# Patient Record
Sex: FEMALE | Race: WHITE | Employment: OTHER | URBAN - METROPOLITAN AREA
[De-identification: names, ages, dates, MRNs, and addresses within clinical notes are randomized per-mention and may not be internally consistent; named-entity substitution may affect disease eponyms.]

---

## 2017-02-15 ENCOUNTER — HOSPITAL ENCOUNTER (EMERGENCY)
Age: 42
Discharge: HOME | End: 2017-02-15
Attending: EMERGENCY MEDICINE
Payer: COMMERCIAL

## 2017-02-15 VITALS
RESPIRATION RATE: 20 BRPM | BODY MASS INDEX: 33.68 KG/M2 | TEMPERATURE: 98.2 F | SYSTOLIC BLOOD PRESSURE: 122 MMHG | HEIGHT: 62 IN | DIASTOLIC BLOOD PRESSURE: 84 MMHG | HEART RATE: 87 BPM | OXYGEN SATURATION: 98 % | WEIGHT: 183 LBS

## 2017-02-15 DIAGNOSIS — K02.9 DENTAL CARIES: Primary | ICD-10-CM

## 2017-02-15 PROCEDURE — 99282 EMERGENCY DEPT VISIT SF MDM: CPT

## 2017-02-15 RX ORDER — AMOXICILLIN AND CLAVULANATE POTASSIUM 875; 125 MG/1; MG/1
1 TABLET, FILM COATED ORAL 2 TIMES DAILY
Qty: 20 TABLET | Refills: 0 | Status: SHIPPED | OUTPATIENT
Start: 2017-02-15 | End: 2017-02-25

## 2017-02-15 RX ORDER — HYDROCODONE BITARTRATE AND ACETAMINOPHEN 5; 325 MG/1; MG/1
1-2 TABLET ORAL EVERY 6 HOURS PRN
Qty: 14 TABLET | Refills: 0 | Status: SHIPPED | OUTPATIENT
Start: 2017-02-15 | End: 2017-02-22

## 2017-02-15 ASSESSMENT — PAIN DESCRIPTION - DESCRIPTORS: DESCRIPTORS: SHARP;CONSTANT;SHOOTING

## 2017-02-15 ASSESSMENT — PAIN DESCRIPTION - LOCATION: LOCATION: TEETH

## 2017-02-15 ASSESSMENT — PAIN DESCRIPTION - PAIN TYPE: TYPE: ACUTE PAIN

## 2017-02-15 ASSESSMENT — PAIN SCALES - GENERAL: PAINLEVEL_OUTOF10: 8

## 2017-04-05 ENCOUNTER — HOSPITAL ENCOUNTER (EMERGENCY)
Age: 42
Discharge: HOME | End: 2017-04-05
Attending: EMERGENCY MEDICINE
Payer: COMMERCIAL

## 2017-04-05 VITALS
DIASTOLIC BLOOD PRESSURE: 76 MMHG | HEART RATE: 92 BPM | WEIGHT: 175 LBS | HEIGHT: 62 IN | BODY MASS INDEX: 32.2 KG/M2 | TEMPERATURE: 98.5 F | SYSTOLIC BLOOD PRESSURE: 106 MMHG | OXYGEN SATURATION: 97 % | RESPIRATION RATE: 18 BRPM

## 2017-04-05 DIAGNOSIS — G89.18 POSTOPERATIVE PAIN: Primary | ICD-10-CM

## 2017-04-05 PROCEDURE — 6360000002 HC RX W HCPCS: Performed by: EMERGENCY MEDICINE

## 2017-04-05 PROCEDURE — 99283 EMERGENCY DEPT VISIT LOW MDM: CPT

## 2017-04-05 PROCEDURE — 96372 THER/PROPH/DIAG INJ SC/IM: CPT

## 2017-04-05 RX ORDER — HYDROCODONE BITARTRATE AND ACETAMINOPHEN 5; 325 MG/1; MG/1
1 TABLET ORAL EVERY 6 HOURS PRN
Qty: 15 TABLET | Refills: 0 | Status: SHIPPED | OUTPATIENT
Start: 2017-04-05 | End: 2017-04-12

## 2017-04-05 RX ORDER — ONDANSETRON 4 MG/1
4 TABLET, ORALLY DISINTEGRATING ORAL ONCE
Status: COMPLETED | OUTPATIENT
Start: 2017-04-05 | End: 2017-04-05

## 2017-04-05 RX ORDER — HYDROMORPHONE HCL 110MG/55ML
1.5 PATIENT CONTROLLED ANALGESIA SYRINGE INTRAVENOUS ONCE
Status: COMPLETED | OUTPATIENT
Start: 2017-04-05 | End: 2017-04-05

## 2017-04-05 RX ORDER — GLIMEPIRIDE 4 MG/1
2 TABLET ORAL
Status: ON HOLD | COMMUNITY
End: 2022-09-29 | Stop reason: HOSPADM

## 2017-04-05 RX ORDER — LOSARTAN POTASSIUM 25 MG/1
50 TABLET ORAL DAILY
Status: ON HOLD | COMMUNITY
End: 2022-09-29 | Stop reason: HOSPADM

## 2017-04-05 RX ORDER — ATORVASTATIN CALCIUM 20 MG/1
40 TABLET, FILM COATED ORAL NIGHTLY
COMMUNITY

## 2017-04-05 RX ORDER — PIOGLITAZONEHYDROCHLORIDE 30 MG/1
30 TABLET ORAL DAILY
COMMUNITY

## 2017-04-05 RX ADMIN — ONDANSETRON 4 MG: 4 TABLET, ORALLY DISINTEGRATING ORAL at 20:30

## 2017-04-05 RX ADMIN — HYDROMORPHONE HYDROCHLORIDE 1.5 MG: 2 INJECTION, SOLUTION INTRAMUSCULAR; INTRAVENOUS; SUBCUTANEOUS at 20:30

## 2017-04-05 ASSESSMENT — PAIN SCALES - GENERAL
PAINLEVEL_OUTOF10: 10
PAINLEVEL_OUTOF10: 10

## 2017-04-05 ASSESSMENT — PAIN DESCRIPTION - DESCRIPTORS: DESCRIPTORS: ACHING

## 2017-04-05 ASSESSMENT — PAIN DESCRIPTION - PAIN TYPE: TYPE: ACUTE PAIN

## 2017-09-16 ENCOUNTER — HOSPITAL ENCOUNTER (EMERGENCY)
Age: 42
Discharge: HOME | End: 2017-09-17
Attending: EMERGENCY MEDICINE
Payer: COMMERCIAL

## 2017-09-16 DIAGNOSIS — T78.40XA ALLERGIC REACTION CAUSED BY A DRUG: ICD-10-CM

## 2017-09-16 DIAGNOSIS — R55 NEAR SYNCOPE: Primary | ICD-10-CM

## 2017-09-16 PROCEDURE — 99284 EMERGENCY DEPT VISIT MOD MDM: CPT

## 2017-09-16 PROCEDURE — 36415 COLL VENOUS BLD VENIPUNCTURE: CPT

## 2017-09-16 PROCEDURE — 80053 COMPREHEN METABOLIC PANEL: CPT

## 2017-09-16 PROCEDURE — 93005 ELECTROCARDIOGRAM TRACING: CPT

## 2017-09-16 RX ORDER — 0.9 % SODIUM CHLORIDE 0.9 %
500 INTRAVENOUS SOLUTION INTRAVENOUS ONCE
Status: COMPLETED | OUTPATIENT
Start: 2017-09-16 | End: 2017-09-17

## 2017-09-17 ENCOUNTER — APPOINTMENT (OUTPATIENT)
Dept: CT IMAGING | Age: 42
End: 2017-09-17
Payer: COMMERCIAL

## 2017-09-17 ENCOUNTER — APPOINTMENT (OUTPATIENT)
Dept: GENERAL RADIOLOGY | Age: 42
End: 2017-09-17
Payer: COMMERCIAL

## 2017-09-17 VITALS
HEIGHT: 62 IN | HEART RATE: 87 BPM | DIASTOLIC BLOOD PRESSURE: 74 MMHG | BODY MASS INDEX: 36.07 KG/M2 | WEIGHT: 196 LBS | RESPIRATION RATE: 18 BRPM | OXYGEN SATURATION: 98 % | TEMPERATURE: 98.8 F | SYSTOLIC BLOOD PRESSURE: 129 MMHG

## 2017-09-17 LAB
ALBUMIN SERPL-MCNC: 4.2 G/DL (ref 3.9–4.9)
ALP BLD-CCNC: 93 U/L (ref 40–130)
ALT SERPL-CCNC: 20 U/L (ref 0–33)
ANION GAP SERPL CALCULATED.3IONS-SCNC: 15 MEQ/L (ref 7–13)
AST SERPL-CCNC: 16 U/L (ref 0–35)
BASOPHILS ABSOLUTE: 0.1 K/UL (ref 0–0.2)
BASOPHILS RELATIVE PERCENT: 1 %
BILIRUB SERPL-MCNC: 0.1 MG/DL (ref 0–1.2)
BUN BLDV-MCNC: 7 MG/DL (ref 6–20)
CALCIUM SERPL-MCNC: 9.8 MG/DL (ref 8.6–10.2)
CHLORIDE BLD-SCNC: 94 MEQ/L (ref 98–107)
CO2: 26 MEQ/L (ref 22–29)
CREAT SERPL-MCNC: 0.63 MG/DL (ref 0.5–0.9)
EOSINOPHILS ABSOLUTE: 0.8 K/UL (ref 0–0.7)
EOSINOPHILS RELATIVE PERCENT: 6 %
GFR AFRICAN AMERICAN: >60
GFR NON-AFRICAN AMERICAN: >60
GLOBULIN: 3.3 G/DL (ref 2.3–3.5)
GLUCOSE BLD-MCNC: 107 MG/DL (ref 74–109)
HCT VFR BLD CALC: 39.7 % (ref 37–47)
HEMOGLOBIN: 13.3 G/DL (ref 12–16)
LYMPHOCYTES ABSOLUTE: 4.2 K/UL (ref 1–4.8)
LYMPHOCYTES RELATIVE PERCENT: 32.7 %
MCH RBC QN AUTO: 28.5 PG (ref 27–31.3)
MCHC RBC AUTO-ENTMCNC: 33.5 % (ref 33–37)
MCV RBC AUTO: 85 FL (ref 82–100)
MONOCYTES ABSOLUTE: 1 K/UL (ref 0.2–0.8)
MONOCYTES RELATIVE PERCENT: 7.5 %
NEUTROPHILS ABSOLUTE: 6.7 K/UL (ref 1.4–6.5)
NEUTROPHILS RELATIVE PERCENT: 52.8 %
PDW BLD-RTO: 15 % (ref 11.5–14.5)
PLATELET # BLD: 390 K/UL (ref 130–400)
POTASSIUM SERPL-SCNC: 4.1 MEQ/L (ref 3.5–5.1)
RBC # BLD: 4.67 M/UL (ref 4.2–5.4)
SODIUM BLD-SCNC: 135 MEQ/L (ref 132–144)
TOTAL PROTEIN: 7.5 G/DL (ref 6.4–8.1)
WBC # BLD: 12.7 K/UL (ref 4.8–10.8)

## 2017-09-17 PROCEDURE — 6360000002 HC RX W HCPCS: Performed by: EMERGENCY MEDICINE

## 2017-09-17 PROCEDURE — 70450 CT HEAD/BRAIN W/O DYE: CPT

## 2017-09-17 PROCEDURE — 85025 COMPLETE CBC W/AUTO DIFF WBC: CPT

## 2017-09-17 PROCEDURE — 71010 XR CHEST PORTABLE: CPT

## 2017-09-17 PROCEDURE — 2580000003 HC RX 258: Performed by: EMERGENCY MEDICINE

## 2017-09-17 PROCEDURE — 96374 THER/PROPH/DIAG INJ IV PUSH: CPT

## 2017-09-17 RX ORDER — DIPHENHYDRAMINE HYDROCHLORIDE 50 MG/ML
25 INJECTION INTRAMUSCULAR; INTRAVENOUS ONCE
Status: COMPLETED | OUTPATIENT
Start: 2017-09-17 | End: 2017-09-17

## 2017-09-17 RX ORDER — 0.9 % SODIUM CHLORIDE 0.9 %
500 INTRAVENOUS SOLUTION INTRAVENOUS ONCE
Status: COMPLETED | OUTPATIENT
Start: 2017-09-17 | End: 2017-09-17

## 2017-09-17 RX ORDER — DIPHENHYDRAMINE HCL 25 MG
25 CAPSULE ORAL EVERY 4 HOURS PRN
Qty: 1 CAPSULE | Refills: 0 | Status: SHIPPED | OUTPATIENT
Start: 2017-09-17 | End: 2017-09-27

## 2017-09-17 RX ADMIN — DIPHENHYDRAMINE HYDROCHLORIDE 25 MG: 50 INJECTION, SOLUTION INTRAMUSCULAR; INTRAVENOUS at 01:11

## 2017-09-17 RX ADMIN — SODIUM CHLORIDE 500 ML: 9 INJECTION, SOLUTION INTRAVENOUS at 02:21

## 2017-09-17 RX ADMIN — SODIUM CHLORIDE 500 ML: 9 INJECTION, SOLUTION INTRAVENOUS at 01:12

## 2017-09-17 ASSESSMENT — ENCOUNTER SYMPTOMS
VOMITING: 0
NAUSEA: 0
DIARRHEA: 0
ABDOMINAL PAIN: 0
BACK PAIN: 0
COUGH: 0
SHORTNESS OF BREATH: 0

## 2017-09-18 LAB
EKG ATRIAL RATE: 102 BPM
EKG P AXIS: -2 DEGREES
EKG P-R INTERVAL: 130 MS
EKG Q-T INTERVAL: 348 MS
EKG QRS DURATION: 92 MS
EKG QTC CALCULATION (BAZETT): 453 MS
EKG R AXIS: 28 DEGREES
EKG T AXIS: 55 DEGREES
EKG VENTRICULAR RATE: 102 BPM

## 2017-09-18 PROCEDURE — 93010 ELECTROCARDIOGRAM REPORT: CPT | Performed by: INTERNAL MEDICINE

## 2017-11-28 ENCOUNTER — HOSPITAL ENCOUNTER (EMERGENCY)
Age: 42
Discharge: HOME | End: 2017-11-28
Payer: COMMERCIAL

## 2017-11-28 ENCOUNTER — APPOINTMENT (OUTPATIENT)
Dept: GENERAL RADIOLOGY | Age: 42
End: 2017-11-28
Payer: COMMERCIAL

## 2017-11-28 VITALS
HEIGHT: 63 IN | SYSTOLIC BLOOD PRESSURE: 123 MMHG | DIASTOLIC BLOOD PRESSURE: 78 MMHG | WEIGHT: 192 LBS | HEART RATE: 110 BPM | OXYGEN SATURATION: 100 % | TEMPERATURE: 98.2 F | BODY MASS INDEX: 34.02 KG/M2 | RESPIRATION RATE: 20 BRPM

## 2017-11-28 DIAGNOSIS — R05.9 COUGH: ICD-10-CM

## 2017-11-28 DIAGNOSIS — Z71.6 ENCOUNTER FOR SMOKING CESSATION COUNSELING: ICD-10-CM

## 2017-11-28 DIAGNOSIS — J40 BRONCHITIS: Primary | ICD-10-CM

## 2017-11-28 PROCEDURE — 71020 XR CHEST STANDARD TWO VW: CPT

## 2017-11-28 PROCEDURE — 99283 EMERGENCY DEPT VISIT LOW MDM: CPT

## 2017-11-28 RX ORDER — TOPIRAMATE 100 MG/1
100 TABLET, FILM COATED ORAL DAILY
COMMUNITY
End: 2019-04-03

## 2017-11-28 RX ORDER — SERTRALINE HYDROCHLORIDE 100 MG/1
100 TABLET, FILM COATED ORAL DAILY
Status: ON HOLD | COMMUNITY
End: 2022-09-29 | Stop reason: HOSPADM

## 2017-11-28 RX ORDER — BENZONATATE 100 MG/1
100 CAPSULE ORAL 3 TIMES DAILY PRN
Qty: 12 CAPSULE | Refills: 0 | Status: SHIPPED | OUTPATIENT
Start: 2017-11-28 | End: 2018-06-10

## 2017-11-28 RX ORDER — BUSPIRONE HYDROCHLORIDE 15 MG/1
15 TABLET ORAL NIGHTLY
Status: ON HOLD | COMMUNITY
End: 2022-09-29 | Stop reason: HOSPADM

## 2017-11-28 RX ORDER — AZITHROMYCIN 250 MG/1
TABLET, FILM COATED ORAL
Qty: 1 PACKET | Refills: 0 | Status: SHIPPED | OUTPATIENT
Start: 2017-11-28 | End: 2017-12-08

## 2017-11-28 ASSESSMENT — PAIN DESCRIPTION - LOCATION: LOCATION: ANKLE

## 2017-11-28 ASSESSMENT — ENCOUNTER SYMPTOMS
PHOTOPHOBIA: 0
COUGH: 1
SHORTNESS OF BREATH: 0
NAUSEA: 0
VOICE CHANGE: 0
APNEA: 0
EYE PAIN: 0
SORE THROAT: 1
ANAL BLEEDING: 0
EYE DISCHARGE: 0
ABDOMINAL DISTENTION: 0
VOMITING: 0

## 2017-11-28 ASSESSMENT — PAIN SCALES - GENERAL: PAINLEVEL_OUTOF10: 8

## 2017-11-28 ASSESSMENT — PAIN DESCRIPTION - ORIENTATION: ORIENTATION: LEFT

## 2017-11-28 NOTE — ED PROVIDER NOTES
[facility] ED  eMERGENCY dEPARTMENT eNCOUnter      Pt Name: Jamil Munroe  MRN: [id number]  [place] 1975  Date of evaluation: 11/28/2017  Provider: Emir Diamond Dr       Chief Complaint   Patient presents with    Cough         HISTORY OF PRESENT ILLNESS   (Location/Symptom, Timing/Onset, Context/Setting, Quality, Duration, Modifying Factors, Severity)  Note limiting factors. Jamil Munroe is a 43 y.o. female who presents to the emergency department Greater than 1 week history of dry cough which is worsening patient states she feels as if she will vomit decreased coughing. Patient denies pain productive cough fever chills. She states she has had history of bronchitis and walking pneumonia she does have diabetes which is undertaken control of this time. With average monthly glucose of 113 and HPI C less than 6.5. Chest medications including patient's allergies. Symptoms mild to moderate in severity nothing improves or worsens symptoms. HPI    Nursing Notes were reviewed. REVIEW OF SYSTEMS    (2-9 systems for level 4, 10 or more for level 5)     Review of Systems   Constitutional: Negative for activity change, appetite change, fever and unexpected weight change. HENT: Positive for sore throat. Negative for ear discharge, nosebleeds and voice change. Eyes: Negative for photophobia, pain and discharge. Respiratory: Positive for cough. Negative for apnea and shortness of breath. Cardiovascular: Negative for chest pain. Gastrointestinal: Negative for abdominal distention, anal bleeding, nausea and vomiting. Endocrine: Negative for cold intolerance, heat intolerance and polyphagia. Genitourinary: Negative for genital sores. Musculoskeletal: Negative for joint swelling, neck pain and neck stiffness. Skin: Negative for pallor. Allergic/Immunologic: Negative for immunocompromised state.    Neurological: Negative for seizures, facial asymmetry and headaches. Hematological: Does not bruise/bleed easily. Psychiatric/Behavioral: Negative for behavioral problems, self-injury and sleep disturbance. All other systems reviewed and are negative. Except as noted above the remainder of the review of systems was reviewed and negative.        PAST MEDICAL HISTORY     Past Medical History:   Diagnosis Date    Chronic back pain     Chronic neck pain     Diabetes mellitus ([facility] [address]     Hyperlipidemia     Osteoarthritis          SURGICAL HISTORY       Past Surgical History:   Procedure Laterality Date    ANKLE SURGERY Right     ANUS SURGERY      anal sphincter reconstruction    CYST REMOVAL Left     KNEE SURGERY Right     OTHER SURGICAL HISTORY      spinal stimulator    OVARIAN CYST REMOVAL Right     TONSILLECTOMY      TUBAL LIGATION      and then reversal of tubal ligation         CURRENT MEDICATIONS       Previous Medications    AMITRIPTYLINE (ELAVIL) 25 MG TABLET    Take 25 mg by mouth nightly    ATORVASTATIN (LIPITOR) 20 MG TABLET    Take 20 mg by mouth nightly    BUSPIRONE (BUSPAR) 15 MG TABLET    Take 15 mg by mouth nightly    DEXTROSE 5 % SOLN WITH EPINEPHRINE 1 MG/ML SOLN 10 MCG/ML    Infuse 0.1 mcg/kg/min intravenously continuous    GABAPENTIN (NEURONTIN) 300 MG CAPSULE    Take 600 mg by mouth 4 times daily 600mg @6am, 2pm, 6pm, 300mg @10pm     GLIMEPIRIDE (AMARYL) 4 MG TABLET    Take 4 mg by mouth every morning (before breakfast)    LOSARTAN (COZAAR) 25 MG TABLET    Take 25 mg by mouth daily    METFORMIN (GLUCOPHAGE) 500 MG TABLET    Take 1 tablet by mouth 2 times daily (with meals)    OMEPRAZOLE (PRILOSEC) 10 MG DELAYED RELEASE CAPSULE    Take 10 mg by mouth daily    OXYCODONE HCL (OXYCONTIN PO)    Take 5 mg by mouth every 4 hours as needed    PIOGLITAZONE (ACTOS) 30 MG TABLET    Take 30 mg by mouth daily    SERTRALINE (ZOLOFT) 100 MG TABLET    Take 100 mg by mouth daily    TIZANIDINE (ZANAFLEX) 2 MG TABLET    Take 2 mg by mouth 4 times daily DISPOSITION/PLAN   DISPOSITION     PATIENT REFERRED TO:  Kiersten Huffman MD  [address] [address]  [phone]    Schedule an appointment as soon as possible for a visit in 2 days      [facility]) ED  [address] [address]  [phone]    If symptoms worsen      DISCHARGE MEDICATIONS:  New Prescriptions    AZITHROMYCIN (ZITHROMAX) 250 MG TABLET    Take 2 tablets (500 mg) on Day 1, followed by 1 tablet (250 mg) once daily on Days 2 through 5.     BENZONATATE (TESSALON PERLES) 100 MG CAPSULE    Take 1 capsule by mouth 3 times daily as needed for Cough          (Please note that portions of this note were completed with a voice recognition program.  Efforts were made to edit the dictations but occasionally words are mis-transcribed.)    Gunjan Yanes PA-C (electronically signed)  Attending Emergency Physician         Gunjan Yanes PA-C  11/28/17 [id number]       Gunjan Yanes PA-C  11/28/17 1131

## 2017-12-08 ENCOUNTER — HOSPITAL ENCOUNTER (OUTPATIENT)
Dept: ORTHOPEDIC SURGERY | Age: 42
Discharge: HOME | End: 2017-12-08
Payer: COMMERCIAL

## 2017-12-08 DIAGNOSIS — M25.511 PAIN IN JOINT OF RIGHT SHOULDER: ICD-10-CM

## 2017-12-08 PROCEDURE — 73030 X-RAY EXAM OF SHOULDER: CPT

## 2017-12-13 ENCOUNTER — APPOINTMENT (OUTPATIENT)
Dept: GENERAL RADIOLOGY | Age: 42
End: 2017-12-13
Payer: COMMERCIAL

## 2017-12-13 ENCOUNTER — HOSPITAL ENCOUNTER (EMERGENCY)
Age: 42
Discharge: HOME HEALTH | End: 2017-12-14
Payer: COMMERCIAL

## 2017-12-13 VITALS
TEMPERATURE: 98.5 F | HEART RATE: 107 BPM | OXYGEN SATURATION: 99 % | WEIGHT: 190 LBS | HEIGHT: 63 IN | DIASTOLIC BLOOD PRESSURE: 82 MMHG | BODY MASS INDEX: 33.66 KG/M2 | SYSTOLIC BLOOD PRESSURE: 115 MMHG | RESPIRATION RATE: 18 BRPM

## 2017-12-13 DIAGNOSIS — S89.92XA KNEE INJURY, LEFT, INITIAL ENCOUNTER: Primary | ICD-10-CM

## 2017-12-13 DIAGNOSIS — S80.02XA CONTUSION OF LEFT KNEE, INITIAL ENCOUNTER: ICD-10-CM

## 2017-12-13 PROCEDURE — 6360000002 HC RX W HCPCS: Performed by: PHYSICIAN ASSISTANT

## 2017-12-13 PROCEDURE — 99283 EMERGENCY DEPT VISIT LOW MDM: CPT

## 2017-12-13 PROCEDURE — 73564 X-RAY EXAM KNEE 4 OR MORE: CPT

## 2017-12-13 RX ORDER — ONDANSETRON 4 MG/1
4 TABLET, ORALLY DISINTEGRATING ORAL ONCE
Status: COMPLETED | OUTPATIENT
Start: 2017-12-13 | End: 2017-12-13

## 2017-12-13 RX ADMIN — ONDANSETRON 4 MG: 4 TABLET, ORALLY DISINTEGRATING ORAL at 23:17

## 2017-12-13 ASSESSMENT — PAIN SCALES - GENERAL: PAINLEVEL_OUTOF10: 7

## 2017-12-13 ASSESSMENT — PAIN DESCRIPTION - LOCATION: LOCATION: KNEE

## 2017-12-13 ASSESSMENT — PAIN DESCRIPTION - ORIENTATION: ORIENTATION: LEFT

## 2017-12-13 ASSESSMENT — PAIN DESCRIPTION - PAIN TYPE: TYPE: ACUTE PAIN

## 2017-12-14 ASSESSMENT — ENCOUNTER SYMPTOMS
EYE DISCHARGE: 0
NAUSEA: 0
APNEA: 0
COUGH: 0
EYE PAIN: 0
ANAL BLEEDING: 0
VOICE CHANGE: 0
ABDOMINAL DISTENTION: 0
PHOTOPHOBIA: 0
VOMITING: 0

## 2017-12-14 NOTE — ED PROVIDER NOTES
[facility] ED  eMERGENCY dEPARTMENT eNCOUnter      Pt Name: Raul Benavides  MRN: [id number]  [place] 1975  Date of evaluation: 12/13/2017  Provider: Bertin Britt PA-C    CHIEF COMPLAINT       Chief Complaint   Patient presents with    Knee Injury     left         HISTORY OF PRESENT ILLNESS   (Location/Symptom, Timing/Onset, Context/Setting, Quality, Duration, Modifying Factors, Severity)  Note limiting factors. Raul Benavides is a 43 y.o. female who presents to the emergency department Patient injured left knee she had recent left ankle surgery has blocked for this. Contusion noted left knee. Symptoms mild to moderate severity movement worsens symptoms patient denies any additional injuries denies neck pain loss of consciousness. HPI    Nursing Notes were reviewed. REVIEW OF SYSTEMS    (2-9 systems for level 4, 10 or more for level 5)     Review of Systems   Constitutional: Negative for activity change, appetite change, fever and unexpected weight change. HENT: Negative for ear discharge, nosebleeds and voice change. Eyes: Negative for photophobia, pain and discharge. Respiratory: Negative for apnea and cough. Cardiovascular: Negative for chest pain. Gastrointestinal: Negative for abdominal distention, anal bleeding, nausea and vomiting. Endocrine: Negative for cold intolerance, heat intolerance and polyphagia. Genitourinary: Negative for genital sores. Musculoskeletal: Positive for arthralgias and gait problem. Negative for joint swelling and neck pain. Skin: Negative for pallor. Allergic/Immunologic: Negative for immunocompromised state. Neurological: Negative for seizures and facial asymmetry. Hematological: Does not bruise/bleed easily. Psychiatric/Behavioral: Negative for behavioral problems, self-injury and sleep disturbance. All other systems reviewed and are negative.       Except as noted above the remainder of the review of systems was Guaifenesin & derivatives; Influenza vaccines; and Morphine    FAMILY HISTORY     History reviewed. No pertinent family history. SOCIAL HISTORY       Social History     Social History    Marital status: Single     Spouse name: N/A    Number of children: N/A    Years of education: N/A     Social History Main Topics    Smoking status: Current Every Day Smoker     Packs/day: 0.50     Years: 20.00     Types: Cigarettes    Smokeless tobacco: Never Used    Alcohol use Yes      Comment: rarely    Drug use: No    Sexual activity: Not Asked     Other Topics Concern    None     Social History Narrative    None       SCREENINGS             PHYSICAL EXAM    (up to 7 for level 4, 8 or more for level 5)     ED Triage Vitals [12/13/17 2209]   BP Temp Temp Source Pulse Resp SpO2 Height Weight   115/82 98.5 °F (36.9 °C) Oral 107 18 99 % 5' 3\" (1.6 m) 190 lb (86.2 kg)       Physical Exam   Constitutional: She is oriented to person, place, and time. She appears well-developed and well-nourished. No distress. HENT:   Head: Normocephalic and atraumatic. Eyes: Pupils are equal, round, and reactive to light. Right eye exhibits no discharge. Left eye exhibits no discharge. Neck: Normal range of motion. Neck supple. Cardiovascular: Normal rate, regular rhythm, normal heart sounds and intact distal pulses. Pulmonary/Chest: Effort normal and breath sounds normal. No stridor. No respiratory distress. Abdominal: Soft. Bowel sounds are normal. She exhibits no distension. Musculoskeletal: Normal range of motion. She exhibits tenderness. Osgood tender overlying patella posterior to knee. Negative varus negative valgus negative medial lateral collateral ligament tenderness positive contusion noted to the patella   Neurological: She is alert and oriented to person, place, and time. Skin: Skin is warm. No erythema. Psychiatric: She has a normal mood and affect. Nursing note and vitals reviewed.       DIAGNOSTIC [id number] [phone]    If symptoms worsen      DISCHARGE MEDICATIONS:  New Prescriptions    No medications on file          (Please note that portions of this note were completed with a voice recognition program.  Efforts were made to edit the dictations but occasionally words are mis-transcribed.)    Ismael Gurrola PA-C (electronically signed)  Attending Emergency Physician         Ismael Gurrola PA-C  12/14/17 0015

## 2017-12-14 NOTE — ED NOTES
Bed: 15  Expected date: 12/13/17  Expected time: 10:03 PM  Means of arrival: [facility]  Comments:  43 female left hip pain, VSS, A/O x Cheng Mclain RN  12/13/17 0936

## 2018-05-17 ENCOUNTER — HOSPITAL ENCOUNTER (OUTPATIENT)
Dept: SLEEP CENTER | Age: 43
Discharge: HOME | End: 2018-05-19
Payer: COMMERCIAL

## 2018-05-17 PROCEDURE — 95810 POLYSOM 6/> YRS 4/> PARAM: CPT

## 2018-06-10 ENCOUNTER — HOSPITAL ENCOUNTER (EMERGENCY)
Age: 43
Discharge: HOME | End: 2018-06-10
Payer: COMMERCIAL

## 2018-06-10 ENCOUNTER — APPOINTMENT (OUTPATIENT)
Dept: GENERAL RADIOLOGY | Age: 43
End: 2018-06-10
Payer: COMMERCIAL

## 2018-06-10 VITALS
WEIGHT: 186 LBS | BODY MASS INDEX: 32.96 KG/M2 | RESPIRATION RATE: 20 BRPM | HEART RATE: 110 BPM | SYSTOLIC BLOOD PRESSURE: 126 MMHG | HEIGHT: 63 IN | OXYGEN SATURATION: 97 % | TEMPERATURE: 98 F | DIASTOLIC BLOOD PRESSURE: 79 MMHG

## 2018-06-10 DIAGNOSIS — J40 BRONCHITIS: Primary | ICD-10-CM

## 2018-06-10 LAB
ALBUMIN SERPL-MCNC: 4.1 G/DL (ref 3.9–4.9)
ALP BLD-CCNC: 120 U/L (ref 40–130)
ALT SERPL-CCNC: 20 U/L (ref 0–33)
ANION GAP SERPL CALCULATED.3IONS-SCNC: 14 MEQ/L (ref 7–13)
AST SERPL-CCNC: 17 U/L (ref 0–35)
BASOPHILS ABSOLUTE: 0.1 K/UL (ref 0–0.2)
BASOPHILS RELATIVE PERCENT: 1 %
BILIRUB SERPL-MCNC: <0.2 MG/DL (ref 0–1.2)
BUN BLDV-MCNC: 15 MG/DL (ref 6–20)
CALCIUM SERPL-MCNC: 8.9 MG/DL (ref 8.6–10.2)
CHLORIDE BLD-SCNC: 98 MEQ/L (ref 98–107)
CO2: 24 MEQ/L (ref 22–29)
CREAT SERPL-MCNC: 0.54 MG/DL (ref 0.5–0.9)
EKG ATRIAL RATE: 87 BPM
EKG P AXIS: 43 DEGREES
EKG P-R INTERVAL: 134 MS
EKG Q-T INTERVAL: 380 MS
EKG QRS DURATION: 92 MS
EKG QTC CALCULATION (BAZETT): 457 MS
EKG R AXIS: 43 DEGREES
EKG T AXIS: 81 DEGREES
EKG VENTRICULAR RATE: 87 BPM
EOSINOPHILS ABSOLUTE: 0.8 K/UL (ref 0–0.7)
EOSINOPHILS RELATIVE PERCENT: 10.1 %
GFR AFRICAN AMERICAN: >60
GFR NON-AFRICAN AMERICAN: >60
GLOBULIN: 3.2 G/DL (ref 2.3–3.5)
GLUCOSE BLD-MCNC: 161 MG/DL (ref 74–109)
HCT VFR BLD CALC: 37.6 % (ref 37–47)
HEMOGLOBIN: 12.7 G/DL (ref 12–16)
LYMPHOCYTES ABSOLUTE: 1.9 K/UL (ref 1–4.8)
LYMPHOCYTES RELATIVE PERCENT: 22.6 %
MCH RBC QN AUTO: 30.4 PG (ref 27–31.3)
MCHC RBC AUTO-ENTMCNC: 33.7 % (ref 33–37)
MCV RBC AUTO: 90.1 FL (ref 82–100)
MONOCYTES ABSOLUTE: 1.1 K/UL (ref 0.2–0.8)
MONOCYTES RELATIVE PERCENT: 12.7 %
NEUTROPHILS ABSOLUTE: 4.4 K/UL (ref 1.4–6.5)
NEUTROPHILS RELATIVE PERCENT: 53.6 %
PDW BLD-RTO: 13.9 % (ref 11.5–14.5)
PLATELET # BLD: 320 K/UL (ref 130–400)
POTASSIUM SERPL-SCNC: 3.9 MEQ/L (ref 3.5–5.1)
RBC # BLD: 4.17 M/UL (ref 4.2–5.4)
SODIUM BLD-SCNC: 136 MEQ/L (ref 132–144)
TOTAL PROTEIN: 7.3 G/DL (ref 6.4–8.1)
TROPONIN: <0.01 NG/ML (ref 0–0.01)
WBC # BLD: 8.3 K/UL (ref 4.8–10.8)

## 2018-06-10 PROCEDURE — 84484 ASSAY OF TROPONIN QUANT: CPT

## 2018-06-10 PROCEDURE — 85025 COMPLETE CBC W/AUTO DIFF WBC: CPT

## 2018-06-10 PROCEDURE — 93005 ELECTROCARDIOGRAM TRACING: CPT

## 2018-06-10 PROCEDURE — 80053 COMPREHEN METABOLIC PANEL: CPT

## 2018-06-10 PROCEDURE — 94760 N-INVAS EAR/PLS OXIMETRY 1: CPT

## 2018-06-10 PROCEDURE — 6360000002 HC RX W HCPCS: Performed by: NURSE PRACTITIONER

## 2018-06-10 PROCEDURE — 96374 THER/PROPH/DIAG INJ IV PUSH: CPT

## 2018-06-10 PROCEDURE — 94640 AIRWAY INHALATION TREATMENT: CPT

## 2018-06-10 PROCEDURE — 99284 EMERGENCY DEPT VISIT MOD MDM: CPT

## 2018-06-10 PROCEDURE — 71046 X-RAY EXAM CHEST 2 VIEWS: CPT

## 2018-06-10 PROCEDURE — 6370000000 HC RX 637 (ALT 250 FOR IP): Performed by: NURSE PRACTITIONER

## 2018-06-10 PROCEDURE — 36415 COLL VENOUS BLD VENIPUNCTURE: CPT

## 2018-06-10 RX ORDER — PREDNISONE 20 MG/1
40 TABLET ORAL DAILY
Qty: 10 TABLET | Refills: 0 | Status: SHIPPED | OUTPATIENT
Start: 2018-06-10 | End: 2018-06-15

## 2018-06-10 RX ORDER — AZITHROMYCIN 250 MG/1
TABLET, FILM COATED ORAL
Qty: 1 PACKET | Refills: 0 | Status: SHIPPED | OUTPATIENT
Start: 2018-06-10 | End: 2018-06-20

## 2018-06-10 RX ORDER — OXCARBAZEPINE 150 MG/1
300 TABLET, FILM COATED ORAL 2 TIMES DAILY
COMMUNITY

## 2018-06-10 RX ORDER — IPRATROPIUM BROMIDE AND ALBUTEROL SULFATE 2.5; .5 MG/3ML; MG/3ML
1 SOLUTION RESPIRATORY (INHALATION) EVERY 10 MIN PRN
Status: COMPLETED | OUTPATIENT
Start: 2018-06-10 | End: 2018-06-10

## 2018-06-10 RX ORDER — BENZONATATE 100 MG/1
200 CAPSULE ORAL 3 TIMES DAILY PRN
Qty: 20 CAPSULE | Refills: 0 | Status: ON HOLD | OUTPATIENT
Start: 2018-06-10 | End: 2022-09-28

## 2018-06-10 RX ORDER — METHYLPREDNISOLONE SODIUM SUCCINATE 125 MG/2ML
125 INJECTION, POWDER, LYOPHILIZED, FOR SOLUTION INTRAMUSCULAR; INTRAVENOUS ONCE
Status: COMPLETED | OUTPATIENT
Start: 2018-06-10 | End: 2018-06-10

## 2018-06-10 RX ORDER — ALBUTEROL SULFATE 90 UG/1
AEROSOL, METERED RESPIRATORY (INHALATION)
Qty: 1 INHALER | Refills: 1 | Status: ON HOLD | OUTPATIENT
Start: 2018-06-10 | End: 2022-09-28

## 2018-06-10 RX ADMIN — IPRATROPIUM BROMIDE AND ALBUTEROL SULFATE 1 AMPULE: .5; 3 SOLUTION RESPIRATORY (INHALATION) at 05:17

## 2018-06-10 RX ADMIN — IPRATROPIUM BROMIDE AND ALBUTEROL SULFATE 1 AMPULE: .5; 3 SOLUTION RESPIRATORY (INHALATION) at 05:27

## 2018-06-10 RX ADMIN — METHYLPREDNISOLONE SODIUM SUCCINATE 125 MG: 125 INJECTION, POWDER, FOR SOLUTION INTRAMUSCULAR; INTRAVENOUS at 05:14

## 2018-06-10 RX ADMIN — IPRATROPIUM BROMIDE AND ALBUTEROL SULFATE 1 AMPULE: .5; 3 SOLUTION RESPIRATORY (INHALATION) at 05:06

## 2018-06-10 ASSESSMENT — ENCOUNTER SYMPTOMS
ABDOMINAL PAIN: 0
SHORTNESS OF BREATH: 1
CONSTIPATION: 0
VOMITING: 0
WHEEZING: 1
CHEST TIGHTNESS: 1
DIARRHEA: 0
SORE THROAT: 1
COUGH: 1
TROUBLE SWALLOWING: 0
VOICE CHANGE: 0
BACK PAIN: 1
SINUS PAIN: 0
RHINORRHEA: 1
ABDOMINAL DISTENTION: 0
SINUS PRESSURE: 1
NAUSEA: 0

## 2018-06-10 ASSESSMENT — PAIN DESCRIPTION - LOCATION: LOCATION: GENERALIZED

## 2018-06-10 ASSESSMENT — PAIN DESCRIPTION - PAIN TYPE: TYPE: ACUTE PAIN

## 2018-06-10 ASSESSMENT — PAIN DESCRIPTION - DESCRIPTORS: DESCRIPTORS: ACHING

## 2018-06-10 ASSESSMENT — PAIN SCALES - GENERAL: PAINLEVEL_OUTOF10: 4

## 2018-06-11 PROCEDURE — 93010 ELECTROCARDIOGRAM REPORT: CPT | Performed by: INTERNAL MEDICINE

## 2018-08-21 ENCOUNTER — HOSPITAL ENCOUNTER (EMERGENCY)
Age: 43
Discharge: HOME | End: 2018-08-22
Attending: EMERGENCY MEDICINE
Payer: COMMERCIAL

## 2018-08-21 DIAGNOSIS — S09.90XA INJURY OF HEAD, INITIAL ENCOUNTER: Primary | ICD-10-CM

## 2018-08-21 PROCEDURE — 99284 EMERGENCY DEPT VISIT MOD MDM: CPT

## 2018-08-21 PROCEDURE — 96375 TX/PRO/DX INJ NEW DRUG ADDON: CPT

## 2018-08-21 PROCEDURE — 96374 THER/PROPH/DIAG INJ IV PUSH: CPT

## 2018-08-21 PROCEDURE — 6360000002 HC RX W HCPCS: Performed by: EMERGENCY MEDICINE

## 2018-08-21 RX ORDER — ONDANSETRON 2 MG/ML
4 INJECTION INTRAMUSCULAR; INTRAVENOUS ONCE
Status: COMPLETED | OUTPATIENT
Start: 2018-08-21 | End: 2018-08-21

## 2018-08-21 RX ADMIN — ONDANSETRON HYDROCHLORIDE 4 MG: 2 INJECTION, SOLUTION INTRAMUSCULAR; INTRAVENOUS at 23:29

## 2018-08-21 RX ADMIN — HYDROMORPHONE HYDROCHLORIDE 1 MG: 1 INJECTION, SOLUTION INTRAMUSCULAR; INTRAVENOUS; SUBCUTANEOUS at 23:29

## 2018-08-21 ASSESSMENT — PAIN SCALES - GENERAL
PAINLEVEL_OUTOF10: 8
PAINLEVEL_OUTOF10: 8

## 2018-08-21 ASSESSMENT — PAIN DESCRIPTION - ORIENTATION: ORIENTATION: LOWER

## 2018-08-21 ASSESSMENT — PAIN DESCRIPTION - DESCRIPTORS: DESCRIPTORS: SHARP

## 2018-08-21 ASSESSMENT — PAIN DESCRIPTION - PAIN TYPE: TYPE: ACUTE PAIN

## 2018-08-21 ASSESSMENT — PAIN DESCRIPTION - LOCATION: LOCATION: HEAD

## 2018-08-22 ENCOUNTER — APPOINTMENT (OUTPATIENT)
Dept: CT IMAGING | Age: 43
End: 2018-08-22
Payer: COMMERCIAL

## 2018-08-22 VITALS
HEART RATE: 85 BPM | SYSTOLIC BLOOD PRESSURE: 130 MMHG | TEMPERATURE: 98.1 F | HEIGHT: 63 IN | BODY MASS INDEX: 32.43 KG/M2 | DIASTOLIC BLOOD PRESSURE: 91 MMHG | RESPIRATION RATE: 17 BRPM | WEIGHT: 183 LBS | OXYGEN SATURATION: 98 %

## 2018-08-22 PROCEDURE — 70450 CT HEAD/BRAIN W/O DYE: CPT

## 2018-08-22 RX ORDER — TRAMADOL HYDROCHLORIDE 50 MG/1
50 TABLET ORAL EVERY 6 HOURS PRN
Qty: 12 TABLET | Refills: 0 | Status: SHIPPED | OUTPATIENT
Start: 2018-08-22 | End: 2018-08-25

## 2018-08-22 ASSESSMENT — ENCOUNTER SYMPTOMS
COUGH: 0
VOMITING: 0
ABDOMINAL DISTENTION: 0
PHOTOPHOBIA: 0
SHORTNESS OF BREATH: 0
ABDOMINAL PAIN: 0
WHEEZING: 0
CHEST TIGHTNESS: 0
EYE DISCHARGE: 0
SORE THROAT: 0

## 2018-08-22 NOTE — ED PROVIDER NOTES
Diagnosis Date    Chronic back pain     Chronic neck pain     Diabetes mellitus ([facility] [address]     Hyperlipidemia     Osteoarthritis          SURGICAL HISTORY       Past Surgical History:   Procedure Laterality Date    ANKLE SURGERY Right     ANUS SURGERY      anal sphincter reconstruction    CYST REMOVAL Left     KNEE SURGERY Right     OTHER SURGICAL HISTORY      spinal stimulator    OVARIAN CYST REMOVAL Right     TONSILLECTOMY      TUBAL LIGATION      and then reversal of tubal ligation         CURRENT MEDICATIONS       Previous Medications    ALBUTEROL SULFATE HFA (PROAIR HFA) 108 (90 BASE) MCG/ACT INHALER    Use every 4 hours while awake for 7-10 days then PRN wheezing  Dispense with SPACER and Instruct on use. May sub Ventolin or Proventil as needed per [employer].     AMITRIPTYLINE (ELAVIL) 25 MG TABLET    Take 25 mg by mouth nightly    ATORVASTATIN (LIPITOR) 20 MG TABLET    Take 20 mg by mouth nightly    BENZONATATE (TESSALON PERLES) 100 MG CAPSULE    Take 2 capsules by mouth 3 times daily as needed for Cough    BUSPIRONE (BUSPAR) 15 MG TABLET    Take 15 mg by mouth nightly    DEXTROSE 5 % SOLN WITH EPINEPHRINE 1 MG/ML SOLN 10 MCG/ML    Infuse 0.1 mcg/kg/min intravenously continuous    GABAPENTIN (NEURONTIN) 300 MG CAPSULE    Take 600 mg by mouth 4 times daily 600mg @6am, 2pm, 6pm, 300mg @10pm     GLIMEPIRIDE (AMARYL) 4 MG TABLET    Take 4 mg by mouth every morning (before breakfast)    LOSARTAN (COZAAR) 25 MG TABLET    Take 25 mg by mouth daily    METFORMIN (GLUCOPHAGE) 500 MG TABLET    Take 1 tablet by mouth 2 times daily (with meals)    OMEPRAZOLE (PRILOSEC) 10 MG DELAYED RELEASE CAPSULE    Take 10 mg by mouth daily    OXCARBAZEPINE (TRILEPTAL) 150 MG TABLET    Take 150 mg by mouth 2 times daily    OXYCODONE HCL (OXYCONTIN PO)    Take 5 mg by mouth every 4 hours as needed    PIOGLITAZONE (ACTOS) 30 MG TABLET    Take 30 mg by mouth daily    SERTRALINE (ZOLOFT) 100 MG TABLET    Take 100 mg by mouth

## 2018-08-22 NOTE — ED NOTES
POC pregnancy negative     Krzysztof Zelaya.  [facility], [facility]  08/21/18 8447

## 2018-08-22 NOTE — ED TRIAGE NOTES
Patient was at home and was in a squatting position and had a mechanical fall backwards and hit the back of her head on a crate. She reports 8/10 head pain with nausea. No active vomiting. Neuro checks WNL. VSS. Daughter at bedside.

## 2019-04-03 ENCOUNTER — HOSPITAL ENCOUNTER (EMERGENCY)
Age: 44
Discharge: HOME | End: 2019-04-03
Attending: EMERGENCY MEDICINE
Payer: MEDICARE

## 2019-04-03 VITALS
BODY MASS INDEX: 31.36 KG/M2 | RESPIRATION RATE: 18 BRPM | WEIGHT: 177 LBS | HEIGHT: 63 IN | TEMPERATURE: 98 F | SYSTOLIC BLOOD PRESSURE: 120 MMHG | DIASTOLIC BLOOD PRESSURE: 68 MMHG | OXYGEN SATURATION: 97 % | HEART RATE: 97 BPM

## 2019-04-03 DIAGNOSIS — S61.432A PUNCTURE WOUND OF LEFT HAND WITHOUT FOREIGN BODY, INITIAL ENCOUNTER: Primary | ICD-10-CM

## 2019-04-03 PROCEDURE — 6370000000 HC RX 637 (ALT 250 FOR IP): Performed by: EMERGENCY MEDICINE

## 2019-04-03 PROCEDURE — 99282 EMERGENCY DEPT VISIT SF MDM: CPT

## 2019-04-03 PROCEDURE — 6360000002 HC RX W HCPCS: Performed by: EMERGENCY MEDICINE

## 2019-04-03 PROCEDURE — 90715 TDAP VACCINE 7 YRS/> IM: CPT | Performed by: EMERGENCY MEDICINE

## 2019-04-03 PROCEDURE — 90471 IMMUNIZATION ADMIN: CPT | Performed by: EMERGENCY MEDICINE

## 2019-04-03 RX ADMIN — TETANUS TOXOID, REDUCED DIPHTHERIA TOXOID AND ACELLULAR PERTUSSIS VACCINE, ADSORBED 0.5 ML: 5; 2.5; 8; 8; 2.5 SUSPENSION INTRAMUSCULAR at 02:51

## 2019-04-03 RX ADMIN — Medication 1 EACH: at 02:51

## 2019-04-03 ASSESSMENT — PAIN DESCRIPTION - DESCRIPTORS: DESCRIPTORS: THROBBING

## 2019-04-03 ASSESSMENT — PAIN SCALES - GENERAL: PAINLEVEL_OUTOF10: 4

## 2019-04-03 ASSESSMENT — PAIN DESCRIPTION - LOCATION: LOCATION: HAND

## 2019-04-03 ASSESSMENT — PAIN DESCRIPTION - PAIN TYPE: TYPE: ACUTE PAIN

## 2019-04-03 ASSESSMENT — PAIN DESCRIPTION - ORIENTATION: ORIENTATION: LEFT

## 2019-04-03 NOTE — ED PROVIDER NOTES
CURRENT MEDICATIONS     Previous Medications    ALBUTEROL SULFATE HFA (PROAIR HFA) 108 (90 BASE) MCG/ACT INHALER    Use every 4 hours while awake for 7-10 days then PRN wheezing  Dispense with SPACER and Instruct on use. May sub Ventolin or Proventil as needed per [employer]. AMITRIPTYLINE (ELAVIL) 25 MG TABLET    Take 25 mg by mouth nightly    ATORVASTATIN (LIPITOR) 20 MG TABLET    Take 20 mg by mouth nightly    BENZONATATE (TESSALON PERLES) 100 MG CAPSULE    Take 2 capsules by mouth 3 times daily as needed for Cough    BUSPIRONE (BUSPAR) 15 MG TABLET    Take 15 mg by mouth nightly    DEXTROSE 5 % SOLN WITH EPINEPHRINE 1 MG/ML SOLN 10 MCG/ML    Infuse 0.1 mcg/kg/min intravenously continuous    GABAPENTIN (NEURONTIN) 300 MG CAPSULE    Take 600 mg by mouth 4 times daily 600mg @6am, 2pm, 6pm, 300mg @10pm     GLIMEPIRIDE (AMARYL) 4 MG TABLET    Take 4 mg by mouth every morning (before breakfast)    LOSARTAN (COZAAR) 25 MG TABLET    Take 25 mg by mouth daily    METFORMIN (GLUCOPHAGE) 500 MG TABLET    Take 1 tablet by mouth 2 times daily (with meals)    OMEPRAZOLE (PRILOSEC) 10 MG DELAYED RELEASE CAPSULE    Take 10 mg by mouth daily    OXCARBAZEPINE (TRILEPTAL) 150 MG TABLET    Take 150 mg by mouth 2 times daily    OXYCODONE HCL (OXYCONTIN PO)    Take 5 mg by mouth every 4 hours as needed    PIOGLITAZONE (ACTOS) 30 MG TABLET    Take 30 mg by mouth daily    SERTRALINE (ZOLOFT) 100 MG TABLET    Take 100 mg by mouth daily    TIZANIDINE (ZANAFLEX) 2 MG TABLET    Take 2 mg by mouth 4 times daily 2mg @6am, 2mg @2pm, 1mg at 6pm 4mg [email or web address].       ALLERGIES     Decadron [dexamethasone]; Guaifenesin & derivatives; Influenza vaccines; Morphine; and Topamax [topiramate]    FAMILY HISTORY     History reviewed. No pertinent family history.        SOCIAL HISTORY       Social History     Socioeconomic History    Marital status: Single     Spouse name: None    Number of children: None    Years of education: None    Highest education level: None   Occupational History    None   Social Needs    Financial resource strain: None    Food insecurity:     Worry: None     Inability: None    Transportation needs:     Medical: None     Non-medical: None   Tobacco Use    Smoking status: Current Every Day Smoker     Packs/day: 0.50     Years: 20.00     Pack years: 10.00     Types: Cigarettes    Smokeless tobacco: Never Used   Substance and Sexual Activity    Alcohol use: Yes     Comment: rarely    Drug use: No    Sexual activity: Not Currently   Lifestyle    Physical activity:     Days per week: None     Minutes per session: None    Stress: None   Relationships    Social connections:     Talks on phone: None     Gets together: None     Attends [employer] service: None     Active member of club or organization: None     Attends meetings of clubs or organizations: None     Relationship status: None    Intimate partner violence:     Fear of current or ex partner: None     Emotionally abused: None     Physically abused: None     Forced sexual activity: None   Other Topics Concern    None   Social History Narrative    None       SCREENINGS             PHYSICAL EXAM    (up to 7 for level 4, 8 or more for level 5)     ED Triage Vitals [04/03/19 0223]   BP Temp Temp Source Pulse Resp SpO2 Height Weight   120/68 98 °F (36.7 °C) Oral 102 18 97 % 5' 3\" (1.6 m) 177 lb (80.3 kg)       Physical Exam   Constitutional: She is oriented to person, place, and time. She appears well-developed and well-nourished. No distress. HENT:   Head: Normocephalic and atraumatic. Mouth/Throat: Oropharynx is clear and moist.   Eyes: Conjunctivae are normal. Right eye exhibits no discharge. Left eye exhibits no discharge. Pupils are equally round and reacting normally. Extraoccular muscles are grossly intact. Neck: Normal range of motion. No tracheal deviation present.    Cardiovascular: Normal rate, regular rhythm, normal heart sounds and intact distal otherwise noted below, none     Lac Repair  Date/Time: 4/3/2019 2:47 AM  Performed by: Jairo Salguero DO  Authorized by: Jairo Salguero DO     Consent:     Consent obtained:  Verbal    Consent given by:  Patient    Risks discussed:  Infection, pain, need for additional repair, poor wound healing and poor cosmetic result  Anesthesia (see MAR for exact dosages): Anesthesia method:  None  Laceration details:     Location:  Hand    Hand location:  L palm    Wound length (cm): 2.  Repair type:     Repair type:  Simple  Pre-procedure details:     Preparation:  Patient was prepped and draped in usual sterile fashion  Exploration:     Wound exploration: wound explored through full range of motion      Wound extent: no foreign bodies/material noted, no nerve damage noted, no tendon damage noted, no underlying fracture noted and no vascular damage noted      Contaminated: no    Treatment:     Area cleansed with:  Hibiclens    Amount of cleaning:  Standard    Irrigation solution:  Sterile water and tap water  Skin repair:     Repair method:  Tissue adhesive and Steri-Strips    Number of Steri-Strips: 3. Approximation:     Approximation:  Close    Vermilion border: well-aligned    Post-procedure details:     Dressing:  Open (no dressing)    Patient tolerance of procedure: Tolerated well, no immediate complications        FINAL IMPRESSION      1.  Puncture wound of left hand without foreign body, initial encounter          DISPOSITION/PLAN   DISPOSITION Decision To Discharge 04/03/2019 02:38:40 AM      PATIENT REFERRED TO:  Skip Waller MD  [address] [place] [address]  [phone]    In 1 week  For wound re-check      DISCHARGE MEDICATIONS:  New Prescriptions    No medications on file          (Please note that portions of this note were completed with a voice recognition program.  Efforts were made to edit the dictations but occasionally words are mis-transcribed.)    Jairo Salguero DO (electronically signed)  Board Certified Emergency Physician          Valeriy Morris DO  04/03/19 [id number]

## 2019-11-08 ENCOUNTER — HOSPITAL ENCOUNTER (OUTPATIENT)
Dept: PHYSICAL THERAPY | Age: 44
Setting detail: THERAPIES SERIES
Discharge: HOME | End: 2019-11-08
Payer: COMMERCIAL

## 2019-11-08 PROCEDURE — 97110 THERAPEUTIC EXERCISES: CPT

## 2019-11-08 PROCEDURE — 97161 PT EVAL LOW COMPLEX 20 MIN: CPT

## 2019-11-08 ASSESSMENT — PAIN DESCRIPTION - PROGRESSION: CLINICAL_PROGRESSION: GRADUALLY IMPROVING

## 2019-11-08 ASSESSMENT — PAIN DESCRIPTION - PAIN TYPE: TYPE: CHRONIC PAIN;SURGICAL PAIN

## 2019-11-08 ASSESSMENT — PAIN DESCRIPTION - FREQUENCY: FREQUENCY: INTERMITTENT

## 2019-11-08 ASSESSMENT — PAIN DESCRIPTION - ORIENTATION: ORIENTATION: RIGHT

## 2019-11-08 ASSESSMENT — PAIN DESCRIPTION - DESCRIPTORS: DESCRIPTORS: SHARP

## 2019-11-08 ASSESSMENT — PAIN DESCRIPTION - LOCATION: LOCATION: KNEE

## 2019-11-12 ENCOUNTER — HOSPITAL ENCOUNTER (OUTPATIENT)
Dept: PHYSICAL THERAPY | Age: 44
Setting detail: THERAPIES SERIES
Discharge: HOME | End: 2019-11-12
Payer: COMMERCIAL

## 2019-11-12 PROCEDURE — 97110 THERAPEUTIC EXERCISES: CPT

## 2019-11-12 ASSESSMENT — PAIN DESCRIPTION - FREQUENCY: FREQUENCY: INTERMITTENT

## 2019-11-12 ASSESSMENT — PAIN SCALES - GENERAL: PAINLEVEL_OUTOF10: 2

## 2019-11-12 ASSESSMENT — PAIN DESCRIPTION - LOCATION: LOCATION: KNEE

## 2019-11-12 ASSESSMENT — PAIN DESCRIPTION - DESCRIPTORS: DESCRIPTORS: ACHING

## 2019-11-12 ASSESSMENT — PAIN DESCRIPTION - PAIN TYPE: TYPE: SURGICAL PAIN;CHRONIC PAIN

## 2019-11-12 ASSESSMENT — PAIN DESCRIPTION - ORIENTATION: ORIENTATION: RIGHT

## 2019-11-15 ENCOUNTER — HOSPITAL ENCOUNTER (OUTPATIENT)
Dept: PHYSICAL THERAPY | Age: 44
Setting detail: THERAPIES SERIES
Discharge: HOME | End: 2019-11-15
Payer: COMMERCIAL

## 2019-11-15 PROCEDURE — 97140 MANUAL THERAPY 1/> REGIONS: CPT

## 2019-11-15 PROCEDURE — 97112 NEUROMUSCULAR REEDUCATION: CPT

## 2019-11-15 PROCEDURE — 97110 THERAPEUTIC EXERCISES: CPT

## 2019-11-20 ENCOUNTER — HOSPITAL ENCOUNTER (OUTPATIENT)
Dept: PHYSICAL THERAPY | Age: 44
Setting detail: THERAPIES SERIES
Discharge: HOME | End: 2019-11-20
Payer: COMMERCIAL

## 2019-11-20 PROCEDURE — 97112 NEUROMUSCULAR REEDUCATION: CPT

## 2019-11-20 PROCEDURE — 97110 THERAPEUTIC EXERCISES: CPT

## 2019-11-22 ENCOUNTER — HOSPITAL ENCOUNTER (OUTPATIENT)
Dept: PHYSICAL THERAPY | Age: 44
Setting detail: THERAPIES SERIES
Discharge: HOME | End: 2019-11-22
Payer: COMMERCIAL

## 2019-11-22 PROCEDURE — 97110 THERAPEUTIC EXERCISES: CPT

## 2019-11-25 ENCOUNTER — HOSPITAL ENCOUNTER (OUTPATIENT)
Dept: PHYSICAL THERAPY | Age: 44
Setting detail: THERAPIES SERIES
Discharge: HOME | End: 2019-11-25
Payer: COMMERCIAL

## 2019-11-29 ENCOUNTER — HOSPITAL ENCOUNTER (OUTPATIENT)
Dept: PHYSICAL THERAPY | Age: 44
Setting detail: THERAPIES SERIES
Discharge: HOME | End: 2019-11-29
Payer: COMMERCIAL

## 2019-12-03 ENCOUNTER — HOSPITAL ENCOUNTER (OUTPATIENT)
Dept: PHYSICAL THERAPY | Age: 44
Setting detail: THERAPIES SERIES
Discharge: HOME | End: 2019-12-03
Payer: COMMERCIAL

## 2019-12-06 ENCOUNTER — HOSPITAL ENCOUNTER (OUTPATIENT)
Dept: PHYSICAL THERAPY | Age: 44
Setting detail: THERAPIES SERIES
Discharge: HOME | End: 2019-12-06
Payer: COMMERCIAL

## 2019-12-10 ENCOUNTER — APPOINTMENT (OUTPATIENT)
Dept: PHYSICAL THERAPY | Age: 44
End: 2019-12-10
Payer: COMMERCIAL

## 2019-12-10 ENCOUNTER — HOSPITAL ENCOUNTER (OUTPATIENT)
Dept: PHYSICAL THERAPY | Age: 44
Setting detail: THERAPIES SERIES
Discharge: HOME | End: 2019-12-10
Payer: COMMERCIAL

## 2019-12-10 PROCEDURE — 97140 MANUAL THERAPY 1/> REGIONS: CPT

## 2019-12-10 PROCEDURE — 97110 THERAPEUTIC EXERCISES: CPT

## 2019-12-13 ENCOUNTER — APPOINTMENT (OUTPATIENT)
Dept: PHYSICAL THERAPY | Age: 44
End: 2019-12-13
Payer: COMMERCIAL

## 2019-12-13 ENCOUNTER — HOSPITAL ENCOUNTER (OUTPATIENT)
Dept: PHYSICAL THERAPY | Age: 44
Setting detail: THERAPIES SERIES
Discharge: HOME | End: 2019-12-13
Payer: COMMERCIAL

## 2019-12-17 ENCOUNTER — HOSPITAL ENCOUNTER (OUTPATIENT)
Dept: PHYSICAL THERAPY | Age: 44
Setting detail: THERAPIES SERIES
Discharge: HOME | End: 2019-12-17
Payer: COMMERCIAL

## 2019-12-17 ENCOUNTER — APPOINTMENT (OUTPATIENT)
Dept: PHYSICAL THERAPY | Age: 44
End: 2019-12-17
Payer: COMMERCIAL

## 2019-12-17 PROCEDURE — 97110 THERAPEUTIC EXERCISES: CPT

## 2019-12-20 ENCOUNTER — HOSPITAL ENCOUNTER (OUTPATIENT)
Dept: PHYSICAL THERAPY | Age: 44
Setting detail: THERAPIES SERIES
Discharge: HOME | End: 2019-12-20
Payer: COMMERCIAL

## 2019-12-20 PROCEDURE — 97110 THERAPEUTIC EXERCISES: CPT

## 2019-12-23 ENCOUNTER — HOSPITAL ENCOUNTER (OUTPATIENT)
Dept: PHYSICAL THERAPY | Age: 44
Setting detail: THERAPIES SERIES
Discharge: HOME | End: 2019-12-23
Payer: COMMERCIAL

## 2019-12-23 PROCEDURE — 97110 THERAPEUTIC EXERCISES: CPT

## 2019-12-27 ENCOUNTER — HOSPITAL ENCOUNTER (OUTPATIENT)
Dept: PHYSICAL THERAPY | Age: 44
Setting detail: THERAPIES SERIES
Discharge: HOME | End: 2019-12-27
Payer: COMMERCIAL

## 2019-12-30 ENCOUNTER — HOSPITAL ENCOUNTER (OUTPATIENT)
Dept: PHYSICAL THERAPY | Age: 44
Setting detail: THERAPIES SERIES
Discharge: HOME | End: 2019-12-30
Payer: COMMERCIAL

## 2019-12-30 PROCEDURE — 97110 THERAPEUTIC EXERCISES: CPT

## 2020-12-22 ENCOUNTER — ANESTHESIA EVENT (OUTPATIENT)
Dept: OPERATING ROOM | Age: 45
End: 2020-12-22
Payer: MEDICARE

## 2020-12-24 ENCOUNTER — ANESTHESIA (OUTPATIENT)
Dept: OPERATING ROOM | Age: 45
End: 2020-12-24
Payer: MEDICARE

## 2020-12-24 ENCOUNTER — HOSPITAL ENCOUNTER (OUTPATIENT)
Age: 45
Setting detail: OUTPATIENT SURGERY
Discharge: HOME | End: 2020-12-24
Attending: SPECIALIST | Admitting: SPECIALIST
Payer: MEDICARE

## 2020-12-24 VITALS
OXYGEN SATURATION: 100 % | SYSTOLIC BLOOD PRESSURE: 147 MMHG | WEIGHT: 186 LBS | RESPIRATION RATE: 20 BRPM | HEIGHT: 63 IN | HEART RATE: 89 BPM | BODY MASS INDEX: 32.96 KG/M2 | TEMPERATURE: 98.6 F | DIASTOLIC BLOOD PRESSURE: 90 MMHG

## 2020-12-24 VITALS — OXYGEN SATURATION: 92 % | DIASTOLIC BLOOD PRESSURE: 56 MMHG | SYSTOLIC BLOOD PRESSURE: 104 MMHG

## 2020-12-24 LAB
ANION GAP SERPL CALCULATED.3IONS-SCNC: 9 MEQ/L (ref 9–15)
BUN BLDV-MCNC: 11 MG/DL (ref 6–20)
CALCIUM SERPL-MCNC: 9.1 MG/DL (ref 8.5–9.9)
CHLORIDE BLD-SCNC: 101 MEQ/L (ref 95–107)
CO2: 26 MEQ/L (ref 20–31)
CREAT SERPL-MCNC: 0.67 MG/DL (ref 0.5–0.9)
GFR AFRICAN AMERICAN: >60
GFR NON-AFRICAN AMERICAN: >60
GLUCOSE BLD-MCNC: 145 MG/DL (ref 70–99)
GLUCOSE BLD-MCNC: 157 MG/DL (ref 60–115)
HCT VFR BLD CALC: 39.1 % (ref 37–47)
HEMOGLOBIN: 12.9 G/DL (ref 12–16)
MCH RBC QN AUTO: 28.6 PG (ref 27–31.3)
MCHC RBC AUTO-ENTMCNC: 33 % (ref 33–37)
MCV RBC AUTO: 86.6 FL (ref 82–100)
PDW BLD-RTO: 14.9 % (ref 11.5–14.5)
PERFORMED ON: ABNORMAL
PLATELET # BLD: 428 K/UL (ref 130–400)
POTASSIUM SERPL-SCNC: 4 MEQ/L (ref 3.4–4.9)
RBC # BLD: 4.51 M/UL (ref 4.2–5.4)
SARS-COV-2, NAAT: NOT DETECTED
SODIUM BLD-SCNC: 136 MEQ/L (ref 135–144)
WBC # BLD: 8.4 K/UL (ref 4.8–10.8)

## 2020-12-24 PROCEDURE — U0002 COVID-19 LAB TEST NON-CDC: HCPCS

## 2020-12-24 PROCEDURE — 2720000010 HC SURG SUPPLY STERILE: Performed by: SPECIALIST

## 2020-12-24 PROCEDURE — 6360000002 HC RX W HCPCS: Performed by: SPECIALIST

## 2020-12-24 PROCEDURE — 2580000003 HC RX 258: Performed by: SPECIALIST

## 2020-12-24 PROCEDURE — 3600000004 HC SURGERY LEVEL 4 BASE: Performed by: SPECIALIST

## 2020-12-24 PROCEDURE — 2709999900 HC NON-CHARGEABLE SUPPLY: Performed by: SPECIALIST

## 2020-12-24 PROCEDURE — 80048 BASIC METABOLIC PNL TOTAL CA: CPT

## 2020-12-24 PROCEDURE — 7100000011 HC PHASE II RECOVERY - ADDTL 15 MIN: Performed by: SPECIALIST

## 2020-12-24 PROCEDURE — 3600000014 HC SURGERY LEVEL 4 ADDTL 15MIN: Performed by: SPECIALIST

## 2020-12-24 PROCEDURE — 7100000010 HC PHASE II RECOVERY - FIRST 15 MIN: Performed by: SPECIALIST

## 2020-12-24 PROCEDURE — 6370000000 HC RX 637 (ALT 250 FOR IP): Performed by: ANESTHESIOLOGY

## 2020-12-24 PROCEDURE — 6360000002 HC RX W HCPCS: Performed by: NURSE ANESTHETIST, CERTIFIED REGISTERED

## 2020-12-24 PROCEDURE — 85027 COMPLETE CBC AUTOMATED: CPT

## 2020-12-24 PROCEDURE — C1820 GENERATOR NEURO RECHG BAT SY: HCPCS | Performed by: SPECIALIST

## 2020-12-24 PROCEDURE — 2580000003 HC RX 258: Performed by: ANESTHESIOLOGY

## 2020-12-24 PROCEDURE — C1787 PATIENT PROGR, NEUROSTIM: HCPCS | Performed by: SPECIALIST

## 2020-12-24 PROCEDURE — 3700000000 HC ANESTHESIA ATTENDED CARE: Performed by: SPECIALIST

## 2020-12-24 PROCEDURE — 3700000001 HC ADD 15 MINUTES (ANESTHESIA): Performed by: SPECIALIST

## 2020-12-24 PROCEDURE — 7100000000 HC PACU RECOVERY - FIRST 15 MIN: Performed by: SPECIALIST

## 2020-12-24 PROCEDURE — 7100000001 HC PACU RECOVERY - ADDTL 15 MIN: Performed by: SPECIALIST

## 2020-12-24 DEVICE — IMPLANTABLE DEVICE: Type: IMPLANTABLE DEVICE | Site: ABDOMEN | Status: FUNCTIONAL

## 2020-12-24 DEVICE — GENERATOR NEUROSTIMULATOR 29GM W53XH48CM THK11CM SPNL CRD: Type: IMPLANTABLE DEVICE | Site: ABDOMEN | Status: FUNCTIONAL

## 2020-12-24 RX ORDER — SODIUM CHLORIDE 0.9 % (FLUSH) 0.9 %
10 SYRINGE (ML) INJECTION EVERY 12 HOURS SCHEDULED
Status: DISCONTINUED | OUTPATIENT
Start: 2020-12-24 | End: 2020-12-24 | Stop reason: HOSPADM

## 2020-12-24 RX ORDER — LIDOCAINE HYDROCHLORIDE 10 MG/ML
1 INJECTION, SOLUTION EPIDURAL; INFILTRATION; INTRACAUDAL; PERINEURAL
Status: DISCONTINUED | OUTPATIENT
Start: 2020-12-24 | End: 2020-12-24 | Stop reason: HOSPADM

## 2020-12-24 RX ORDER — CEFAZOLIN SODIUM 2 G/50ML
2 SOLUTION INTRAVENOUS
Status: COMPLETED | OUTPATIENT
Start: 2020-12-24 | End: 2020-12-24

## 2020-12-24 RX ORDER — MIDAZOLAM HYDROCHLORIDE 1 MG/ML
INJECTION INTRAMUSCULAR; INTRAVENOUS PRN
Status: DISCONTINUED | OUTPATIENT
Start: 2020-12-24 | End: 2020-12-24 | Stop reason: SDUPTHER

## 2020-12-24 RX ORDER — MAGNESIUM HYDROXIDE 1200 MG/15ML
LIQUID ORAL CONTINUOUS PRN
Status: COMPLETED | OUTPATIENT
Start: 2020-12-24 | End: 2020-12-24

## 2020-12-24 RX ORDER — MEPERIDINE HYDROCHLORIDE 25 MG/ML
12.5 INJECTION INTRAMUSCULAR; INTRAVENOUS; SUBCUTANEOUS EVERY 5 MIN PRN
Status: DISCONTINUED | OUTPATIENT
Start: 2020-12-24 | End: 2020-12-24 | Stop reason: HOSPADM

## 2020-12-24 RX ORDER — SODIUM CHLORIDE, SODIUM LACTATE, POTASSIUM CHLORIDE, CALCIUM CHLORIDE 600; 310; 30; 20 MG/100ML; MG/100ML; MG/100ML; MG/100ML
INJECTION, SOLUTION INTRAVENOUS CONTINUOUS
Status: DISCONTINUED | OUTPATIENT
Start: 2020-12-24 | End: 2020-12-24 | Stop reason: HOSPADM

## 2020-12-24 RX ORDER — FENTANYL CITRATE 50 UG/ML
INJECTION, SOLUTION INTRAMUSCULAR; INTRAVENOUS PRN
Status: DISCONTINUED | OUTPATIENT
Start: 2020-12-24 | End: 2020-12-24 | Stop reason: SDUPTHER

## 2020-12-24 RX ORDER — HYDROCODONE BITARTRATE AND ACETAMINOPHEN 5; 325 MG/1; MG/1
2 TABLET ORAL PRN
Status: COMPLETED | OUTPATIENT
Start: 2020-12-24 | End: 2020-12-24

## 2020-12-24 RX ORDER — FENTANYL CITRATE 50 UG/ML
50 INJECTION, SOLUTION INTRAMUSCULAR; INTRAVENOUS EVERY 10 MIN PRN
Status: DISCONTINUED | OUTPATIENT
Start: 2020-12-24 | End: 2020-12-24 | Stop reason: HOSPADM

## 2020-12-24 RX ORDER — HYDROCODONE BITARTRATE AND ACETAMINOPHEN 5; 325 MG/1; MG/1
1 TABLET ORAL PRN
Status: COMPLETED | OUTPATIENT
Start: 2020-12-24 | End: 2020-12-24

## 2020-12-24 RX ORDER — LIDOCAINE HYDROCHLORIDE 20 MG/ML
INJECTION, SOLUTION INTRAVENOUS PRN
Status: DISCONTINUED | OUTPATIENT
Start: 2020-12-24 | End: 2020-12-24 | Stop reason: SDUPTHER

## 2020-12-24 RX ORDER — CEFAZOLIN SODIUM 1 G/3ML
INJECTION, POWDER, FOR SOLUTION INTRAMUSCULAR; INTRAVENOUS PRN
Status: DISCONTINUED | OUTPATIENT
Start: 2020-12-24 | End: 2020-12-24 | Stop reason: ALTCHOICE

## 2020-12-24 RX ORDER — ANTACID TABLETS 500 MG/1
1 TABLET, CHEWABLE ORAL EVERY MORNING
Status: ON HOLD | COMMUNITY
End: 2022-09-28

## 2020-12-24 RX ORDER — PROPOFOL 10 MG/ML
INJECTION, EMULSION INTRAVENOUS CONTINUOUS PRN
Status: DISCONTINUED | OUTPATIENT
Start: 2020-12-24 | End: 2020-12-24 | Stop reason: SDUPTHER

## 2020-12-24 RX ORDER — ONDANSETRON 2 MG/ML
4 INJECTION INTRAMUSCULAR; INTRAVENOUS
Status: DISCONTINUED | OUTPATIENT
Start: 2020-12-24 | End: 2020-12-24 | Stop reason: HOSPADM

## 2020-12-24 RX ORDER — DIPHENHYDRAMINE HYDROCHLORIDE 50 MG/ML
12.5 INJECTION INTRAMUSCULAR; INTRAVENOUS
Status: DISCONTINUED | OUTPATIENT
Start: 2020-12-24 | End: 2020-12-24 | Stop reason: HOSPADM

## 2020-12-24 RX ORDER — SODIUM CHLORIDE 0.9 % (FLUSH) 0.9 %
10 SYRINGE (ML) INJECTION PRN
Status: DISCONTINUED | OUTPATIENT
Start: 2020-12-24 | End: 2020-12-24 | Stop reason: HOSPADM

## 2020-12-24 RX ORDER — METOCLOPRAMIDE HYDROCHLORIDE 5 MG/ML
10 INJECTION INTRAMUSCULAR; INTRAVENOUS
Status: DISCONTINUED | OUTPATIENT
Start: 2020-12-24 | End: 2020-12-24 | Stop reason: HOSPADM

## 2020-12-24 RX ADMIN — PROPOFOL 180 MCG/KG/MIN: 10 INJECTION, EMULSION INTRAVENOUS at 12:35

## 2020-12-24 RX ADMIN — FENTANYL CITRATE 25 MCG: 50 INJECTION, SOLUTION INTRAMUSCULAR; INTRAVENOUS at 13:05

## 2020-12-24 RX ADMIN — SODIUM CHLORIDE, POTASSIUM CHLORIDE, SODIUM LACTATE AND CALCIUM CHLORIDE: 600; 310; 30; 20 INJECTION, SOLUTION INTRAVENOUS at 11:32

## 2020-12-24 RX ADMIN — SODIUM CHLORIDE, POTASSIUM CHLORIDE, SODIUM LACTATE AND CALCIUM CHLORIDE: 600; 310; 30; 20 INJECTION, SOLUTION INTRAVENOUS at 11:35

## 2020-12-24 RX ADMIN — HYDROCODONE BITARTRATE AND ACETAMINOPHEN 2 TABLET: 5; 325 TABLET ORAL at 13:39

## 2020-12-24 RX ADMIN — MIDAZOLAM HYDROCHLORIDE 2 MG: 2 INJECTION, SOLUTION INTRAMUSCULAR; INTRAVENOUS at 12:33

## 2020-12-24 RX ADMIN — LIDOCAINE HYDROCHLORIDE 50 MG: 20 INJECTION, SOLUTION INTRAVENOUS at 12:35

## 2020-12-24 RX ADMIN — CEFAZOLIN SODIUM 2 G: 2 SOLUTION INTRAVENOUS at 12:33

## 2020-12-24 ASSESSMENT — PULMONARY FUNCTION TESTS
PIF_VALUE: 0
PIF_VALUE: 3
PIF_VALUE: 0
PIF_VALUE: 1
PIF_VALUE: 0
PIF_VALUE: 2
PIF_VALUE: 0
PIF_VALUE: 2
PIF_VALUE: 0
PIF_VALUE: 1
PIF_VALUE: 1
PIF_VALUE: 26
PIF_VALUE: 0
PIF_VALUE: 1
PIF_VALUE: 0
PIF_VALUE: 1
PIF_VALUE: 0
PIF_VALUE: 0

## 2020-12-24 ASSESSMENT — PAIN - FUNCTIONAL ASSESSMENT: PAIN_FUNCTIONAL_ASSESSMENT: 0-10

## 2020-12-24 ASSESSMENT — PAIN SCALES - GENERAL: PAINLEVEL_OUTOF10: 7

## 2020-12-24 NOTE — ANESTHESIA POSTPROCEDURE EVALUATION
Department of Anesthesiology  Postprocedure Note    Patient: Tony Hoskins  MRN: [id number]  YOB: 1975  Date of evaluation: 12/24/2020  Time:  1:36 PM     Procedure Summary     Date: 12/24/20 Room / Location: [address] [place] [address]    Anesthesia Start: 9650 Anesthesia Stop: 8565    Procedure: REPLACEMENT OF SPINAL CORD STIMULATOR GENERATOR (N/A Abdomen) Diagnosis: (NECK PAIN)    Surgeons: Thomas Monreal MD Responsible Provider: Elliot Mo MD    Anesthesia Type: MAC ASA Status: 3          Anesthesia Type: MAC    Kamryn Phase I: Kamryn Score: 10    Kamryn Phase II:      Last vitals: Reviewed and per EMR flowsheets.        Anesthesia Post Evaluation    Patient location during evaluation: PACU  Patient participation: complete - patient participated  Level of consciousness: awake and alert  Pain score: 1  Airway patency: patent  Nausea & Vomiting: no nausea and no vomiting  Complications: no  Cardiovascular status: hemodynamically stable  Respiratory status: acceptable  Hydration status: euvolemic  Comments: Report to RN, normal sinus rhythm

## 2020-12-24 NOTE — ANESTHESIA PRE PROCEDURE
Department of Anesthesiology  Preprocedure Note       Name:  Esther Anders   Age:  39 y.o.  :  1975                                          MRN:  [id number]         Date:  2020      Surgeon: Valdemar Huynh):  Francisco Loredo MD    Procedure: Procedure(s):  PLACEMENT OF SPINAL CORD STIMULATOR GENERATOR. (PAT ON ADMIT) DR HIGGINS  REQUESTING 12:30 PM  SU    Medications prior to admission:   Prior to Admission medications    Medication Sig Start Date End Date Taking? Authorizing Provider   OXcarbazepine (TRILEPTAL) 150 MG tablet Take 150 mg by mouth 2 times daily    Historical Provider, MD   benzonatate (TESSALON PERLES) 100 MG capsule Take 2 capsules by mouth 3 times daily as needed for Cough 6/10/18   Eddie Ward PA-C   albuterol sulfate HFA (PROAIR HFA) 108 (90 Base) MCG/ACT inhaler Use every 4 hours while awake for 7-10 days then PRN wheezing  Dispense with SPACER and Instruct on use. May sub Ventolin or Proventil as needed per [employer].  6/10/18   Eddie Carballo PA-C   OxyCODONE HCl (OXYCONTIN PO) Take 5 mg by mouth every 4 hours as needed    Historical Provider, MD   sertraline (ZOLOFT) 100 MG tablet Take 100 mg by mouth daily    Historical Provider, MD   busPIRone (BUSPAR) 15 MG tablet Take 15 mg by mouth nightly    Historical Provider, MD   dextrose 5 % SOLN with EPINEPHrine 1 mg/mL SOLN 10 mcg/mL Infuse 0.1 mcg/kg/min intravenously continuous    Historical Provider, MD   glimepiride (AMARYL) 4 MG tablet Take 4 mg by mouth every morning (before breakfast)    Historical Provider, MD   pioglitazone (ACTOS) 30 MG tablet Take 30 mg by mouth daily    Historical Provider, MD   losartan (COZAAR) 25 MG tablet Take 25 mg by mouth daily    Historical Provider, MD   atorvastatin (LIPITOR) 20 MG tablet Take 20 mg by mouth nightly    Historical Provider, MD   metFORMIN (GLUCOPHAGE) 500 MG tablet Take 1 tablet by mouth 2 times daily (with meals) 16   Sindy Balderas MD   amitriptyline (ELAVIL) 25 MG tablet Take 25 mg by mouth nightly    Historical Provider, MD   tiZANidine (ZANAFLEX) 2 MG tablet Take 2 mg by mouth 4 times daily 2mg @6am, 2mg @2pm, 1mg at 6pm 4mg Pippa@[employer].    Historical Provider, MD   omeprazole (PRILOSEC) 10 MG delayed release capsule Take 10 mg by mouth daily    Historical Provider, MD   gabapentin (NEURONTIN) 300 MG capsule Take 600 mg by mouth 4 times daily 600mg @6am, 2pm, 6pm, 300mg @10pm     Historical Provider, MD       Current medications:    Current Facility-Administered Medications   Medication Dose Route Frequency Provider Last Rate Last Admin    ceFAZolin (ANCEF) 2 g in dextrose 3 % 50 mL IVPB (duplex)  2 g Intravenous On Call to Justina Marin MD        lactated ringers infusion   Intravenous Continuous Sangeeta Champion MD        meperidine (DEMEROL) injection 12.5 mg  12.5 mg Intravenous Q5 Min PRN Del Gonzalez MD        fentaNYL (SUBLIMAZE) injection 50 mcg  50 mcg Intravenous Q10 Min PRN Del Gonzalez MD        HYDROmorphone (DILAUDID) injection 0.5 mg  0.5 mg Intravenous Q10 Min PRN Del Gonzalez MD        HYDROcodone-acetaminophen [facility]) 5-325 MG per tablet 1 tablet  1 tablet Oral PRN Del Gonzalez MD        Or    HYDROcodone-acetaminophen [facility]) 5-325 MG per tablet 2 tablet  2 tablet Oral PRN Del Gonzalez MD        ondansetron [facility]) injection 4 mg  4 mg Intravenous Once PRN Del Gonzalez MD        metoclopramide [facility]) injection 10 mg  10 mg Intravenous Once PRN Del Gonzalez MD        diphenhydrAMINE (BENADRYL) injection 12.5 mg  12.5 mg Intravenous Once PRN Del Gonzalez MD        lactated ringers infusion   Intravenous Continuous Del Gonzalez MD        sodium chloride flush 0.9 % injection 10 mL  10 mL Intravenous 2 times per day Del Gonzalez MD        sodium chloride flush 0.9 % injection 10 mL  10 mL Intravenous PRN Del Gonzalez MD        lidocaine PF 1 % injection 1 mL  1 mL Intradermal Once PRN Eriberto Wing MD           Allergies:     Allergies   Allergen Reactions    Decadron [Dexamethasone]     Guaifenesin & Derivatives     Influenza Vaccines     Morphine Hives    Topamax [Topiramate]        Problem List:    Patient Active Problem List   Diagnosis Code    Right arm pain M79.601    Numbness R20.0    Neck pain M54.2    Weakness of right arm R29.898    Smoker F17.200    CTS (carpal tunnel syndrome) G56.00    DMII (diabetes mellitus, type 2) (HCC) E11.9    Hypotension I95.9       Past Medical History:        Diagnosis Date    Anxiety     Chronic back pain     Chronic neck pain     Depression     Diabetes mellitus ([address]     Hyperlipidemia     Hypertension     Osteoarthritis     Panic attack        Past Surgical History:        Procedure Laterality Date    ANKLE SURGERY Right     ANUS SURGERY      anal sphincter reconstruction    CYST REMOVAL Left     KNEE SURGERY Right     OTHER SURGICAL HISTORY      spinal stimulator    OVARIAN CYST REMOVAL Right     TONSILLECTOMY      TUBAL LIGATION      and then reversal of tubal ligation       Social History:    Social History     Tobacco Use    Smoking status: Current Every Day Smoker     Packs/day: 0.50     Years: 20.00     Pack years: 10.00     Types: Cigarettes    Smokeless tobacco: Never Used   Substance Use Topics    Alcohol use: Yes     Comment: rarely                                Ready to quit: Not Answered  Counseling given: Not Answered      Vital Signs (Current):   Vitals:    12/24/20 1055   BP: 125/72   Pulse: 83   Resp: 16   Temp: 97.9 °F (36.6 °C)   TempSrc: Temporal   SpO2: 98%   Weight: 186 lb (84.4 kg)   Height: 5' 3\" (1.6 m)                                              BP Readings from Last 3 Encounters:   12/24/20 125/72   04/03/19 120/68   08/21/18 (!) 130/91       NPO Status:                                                                                 BMI: Wt Readings from Last 3 Encounters:   12/24/20 186 lb (84.4 kg)   04/03/19 177 lb (80.3 kg)   08/21/18 183 lb (83 kg)     Body mass index is 32.95 kg/m². CBC:   Lab Results   Component Value Date    WBC 8.3 06/10/2018    RBC 4.17 06/10/2018    HGB 12.7 06/10/2018    HCT 37.6 06/10/2018    MCV 90.1 06/10/2018    RDW 13.9 06/10/2018     06/10/2018       CMP:   Lab Results   Component Value Date     06/10/2018    K 3.9 06/10/2018    CL 98 06/10/2018    CO2 24 06/10/2018    BUN 15 06/10/2018    CREATININE 0.54 06/10/2018    GFRAA >60.0 06/10/2018    LABGLOM >60.0 06/10/2018    GLUCOSE 161 06/10/2018    PROT 7.3 06/10/2018    CALCIUM 8.9 06/10/2018    BILITOT <0.2 06/10/2018    ALKPHOS 120 06/10/2018    AST 17 06/10/2018    ALT 20 06/10/2018       POC Tests: No results for input(s): POCGLU, POCNA, POCK, POCCL, POCBUN, POCHEMO, POCHCT in the last 72 hours. Coags: No results found for: PROTIME, INR, APTT    HCG (If Applicable): No results found for: PREGTESTUR, PREGSERUM, HCG, HCGQUANT     ABGs: No results found for: PHART, PO2ART, XOA5QWS, CVJ1WBP, BEART, H2YIQHSQ     Type & Screen (If Applicable):  No results found for: LABABO, LABRH    Drug/Infectious Status (If Applicable):  No results found for: HIV, HEPCAB    COVID-19 Screening (If Applicable): No results found for: COVID19      Anesthesia Evaluation  Patient summary reviewed and Nursing notes reviewed no history of anesthetic complications:   Airway: Mallampati: II  TM distance: >3 FB   Neck ROM: full  Mouth opening: > = 3 FB Dental: normal exam         Pulmonary:Negative Pulmonary ROS and normal exam                               Cardiovascular:    (+) hypertension:,                   Neuro/Psych:   (+) neuromuscular disease:, psychiatric history:            GI/Hepatic/Renal:   (+) morbid obesity          Endo/Other:    (+) Diabetes, . Abdominal:   (+) obese,         Vascular: negative vascular ROS. Anesthesia Plan      MAC     ASA 3       Induction: intravenous. Anesthetic plan and risks discussed with patient. Plan discussed with CRNA.     Attending anesthesiologist reviewed and agrees with Pre Eval content              Moy Reed MD   12/24/2020

## 2020-12-24 NOTE — BRIEF OP NOTE
Brief Postoperative Note      Patient: Laura Ribeiro  YOB: 1975  MRN: [id number]    Date of Procedure: 12/24/2020    Pre-Op Diagnosis: NECK PAIN    Post-Op Diagnosis: Same       Procedure(s):  REPLACEMENT OF SPINAL CORD STIMULATOR GENERATOR    Surgeon(s):  Aarti Cabrera MD    Assistant:  First Assistant: [address]    Anesthesia: Monitor Anesthesia Care    Estimated Blood Loss (mL): Minimal    Complications: None    Specimens:   * No specimens in log *    Implants:  Implant Name Type Inv. Item Serial No.  Lot No. LRB No. Used Action   GENERATOR NEUROSTIMULATOR 29GM [id number] SPNL CRD - [id number]  GENERATOR NEUROSTIMULATOR 29GM [id number] SPNL CRD [id number]  N/A 1 Implanted    PT 128GM H6.8XL10.77CM THK2. 6CM SPNL CRD W/ - [id number]   PT 128GM H6.8XL10.77CM THK2. 6CM SPNL CRD W/ [id number] [employer]  N/A 1 Implanted         Drains: * No LDAs found *    Findings:  The old Spinal Cord Stimulator IPG was removed and a new one implanted    Electronically signed by Aarti Cabrera MD on 12/24/2020 at 1:12 PM

## 2020-12-24 NOTE — PROGRESS NOTES
Pt up and ambulated to the bathroom was able to urinate w/out diff, pt stimulator is functioning and she is comfortable and satisfied, pts ride is here to get her

## 2020-12-25 NOTE — OP NOTE
[address]                      [address], [address] [facility]                                OPERATIVE REPORT    PATIENT NAME: Lacey Granados                      :        1975  MED REC NO:   [id number]                            ROOM:  ACCOUNT NO:   [de-identified]                           ADMIT DATE: 2020  PROVIDER:     Geovanny Infante MD    DATE OF PROCEDURE:  2020    PREOPERATIVE DIAGNOSIS:  Chronic neck pain, cervical radiculopathy. POSTOPERATIVE DIAGNOSIS:  Chronic neck pain, cervical radiculopathy. OPERATION PERFORMED:  Revision of spinal cord stimulator, internal pulse  generator or IPG. SURGEON:  Geovanny Infante MD    ANESTHESIA:  MAC with local anesthesia. COMPLICATIONS:  None. BLOOD LOSS:  Minimal.    INDICATIONS:  End of life for the IPG. OPERATIVE PROCEDURE:  The patient received 2 gm of Ancef intravenously  30-minute before the procedure start. The patient was brought to the  operating room and placed in the supine position and after applying all  monitors and protecting all pressure points, the left upper and lower  quadrant of the abdomen was prepped and draped aseptically. Local  anesthetic using lidocaine 2% was infiltrated at the old surgical scar  for the IPG pocket and elliptical incision was used to remove the old  surgical scar. Sharp and blunt dissection was used to deliver the IPG  from the pocket and IPG was disconnected from the two spinal cord  stimulator leads. Hemostasis was done and antibiotic irrigation was  done. The two spinal cord stimulator leads were connected to the new  IPG and impedance were checked and numbers were within normal limits.    We proceeded with dissection at the upper part of the incision because  the patient wanted to move the IPG up in her abdominal area, so sharp  and blunt dissection was used to created another pocket at the upper end of the incision. Then 2-0 silk suture, two of them were used to anchor  the IPG at the higher pocket and the incision was closed in layers using  3-0 Vicryl interrupted inverted suture for the subcutaneous layer and  4-0 Vicryl subcuticular continuous suture for the skin. Steri-Strips  and sterile dressing were applied. The patient tolerated the procedure  well and transferred to the recovery area in stable condition moving  both lower extremities. No immediate complication noticed.         Smith Morrissey MD    D: 12/24/2020 13:23:17       T: 12/24/2020 22:00:27     [email or web address]  Job#: [id number]     Doc#: [id number]    CC:

## 2021-08-06 ENCOUNTER — APPOINTMENT (OUTPATIENT)
Dept: GENERAL RADIOLOGY | Age: 46
End: 2021-08-06
Payer: MEDICARE

## 2021-08-06 ENCOUNTER — HOSPITAL ENCOUNTER (EMERGENCY)
Age: 46
Discharge: HOME | End: 2021-08-06
Payer: MEDICARE

## 2021-08-06 VITALS
BODY MASS INDEX: 35.33 KG/M2 | TEMPERATURE: 96.7 F | WEIGHT: 192 LBS | HEART RATE: 70 BPM | OXYGEN SATURATION: 99 % | DIASTOLIC BLOOD PRESSURE: 75 MMHG | HEIGHT: 62 IN | RESPIRATION RATE: 18 BRPM | SYSTOLIC BLOOD PRESSURE: 131 MMHG
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
## 2021-08-06 DIAGNOSIS — S29.011A MUSCLE STRAIN OF CHEST WALL, INITIAL ENCOUNTER: ICD-10-CM

## 2021-08-06 LAB
ALBUMIN SERPL-MCNC: 4.3 G/DL (ref 3.5–4.6)
ALP BLD-CCNC: 112 U/L (ref 40–130)
ALT SERPL-CCNC: 18 U/L (ref 0–33)
ANION GAP SERPL CALCULATED.3IONS-SCNC: 9 MEQ/L (ref 9–15)
AST SERPL-CCNC: 18 U/L (ref 0–35)
BASOPHILS ABSOLUTE: 0.1 K/UL (ref 0–0.2)
BASOPHILS RELATIVE PERCENT: 0.8 %
BILIRUB SERPL-MCNC: 0.3 MG/DL (ref 0.2–0.7)
BUN BLDV-MCNC: 7 MG/DL (ref 6–20)
CALCIUM SERPL-MCNC: 9.2 MG/DL (ref 8.5–9.9)
CHLORIDE BLD-SCNC: 102 MEQ/L (ref 95–107)
CO2: 28 MEQ/L (ref 20–31)
CREAT SERPL-MCNC: 0.67 MG/DL (ref 0.5–0.9)
EKG ATRIAL RATE: 68 BPM
EKG P AXIS: 38 DEGREES
EKG P-R INTERVAL: 138 MS
EKG Q-T INTERVAL: 406 MS
EKG QRS DURATION: 96 MS
EKG QTC CALCULATION (BAZETT): 431 MS
EKG R AXIS: 4 DEGREES
EKG T AXIS: 25 DEGREES
EKG VENTRICULAR RATE: 68 BPM
EOSINOPHILS ABSOLUTE: 0.4 K/UL (ref 0–0.7)
EOSINOPHILS RELATIVE PERCENT: 5.4 %
GFR AFRICAN AMERICAN: >60
GFR NON-AFRICAN AMERICAN: >60
GLOBULIN: 3.2 G/DL (ref 2.3–3.5)
GLUCOSE BLD-MCNC: 148 MG/DL (ref 70–99)
HCT VFR BLD CALC: 40 % (ref 37–47)
HEMOGLOBIN: 13.1 G/DL (ref 12–16)
LYMPHOCYTES ABSOLUTE: 2 K/UL (ref 1–4.8)
LYMPHOCYTES RELATIVE PERCENT: 25 %
MCH RBC QN AUTO: 28.8 PG (ref 27–31.3)
MCHC RBC AUTO-ENTMCNC: 32.8 % (ref 33–37)
MCV RBC AUTO: 88 FL (ref 82–100)
MONOCYTES ABSOLUTE: 0.8 K/UL (ref 0.2–0.8)
MONOCYTES RELATIVE PERCENT: 9.8 %
NEUTROPHILS ABSOLUTE: 4.7 K/UL (ref 1.4–6.5)
NEUTROPHILS RELATIVE PERCENT: 59 %
PDW BLD-RTO: 14.3 % (ref 11.5–14.5)
PLATELET # BLD: 402 K/UL (ref 130–400)
POTASSIUM SERPL-SCNC: 3.8 MEQ/L (ref 3.4–4.9)
RBC # BLD: 4.55 M/UL (ref 4.2–5.4)
SODIUM BLD-SCNC: 139 MEQ/L (ref 135–144)
TOTAL PROTEIN: 7.5 G/DL (ref 6.3–8)
TROPONIN: <0.01 NG/ML (ref 0–0.01)
WBC # BLD: 8 K/UL (ref 4.8–10.8)

## 2021-08-06 PROCEDURE — 80053 COMPREHEN METABOLIC PANEL: CPT

## 2021-08-06 PROCEDURE — 84484 ASSAY OF TROPONIN QUANT: CPT

## 2021-08-06 PROCEDURE — 93005 ELECTROCARDIOGRAM TRACING: CPT | Performed by: NURSE PRACTITIONER

## 2021-08-06 PROCEDURE — 36415 COLL VENOUS BLD VENIPUNCTURE: CPT

## 2021-08-06 PROCEDURE — 71046 X-RAY EXAM CHEST 2 VIEWS: CPT

## 2021-08-06 PROCEDURE — 99285 EMERGENCY DEPT VISIT HI MDM: CPT

## 2021-08-06 PROCEDURE — 72050 X-RAY EXAM NECK SPINE 4/5VWS: CPT

## 2021-08-06 PROCEDURE — 93010 ELECTROCARDIOGRAM REPORT: CPT | Performed by: INTERNAL MEDICINE

## 2021-08-06 PROCEDURE — 85025 COMPLETE CBC W/AUTO DIFF WBC: CPT

## 2021-08-06 PROCEDURE — 6370000000 HC RX 637 (ALT 250 FOR IP): Performed by: NURSE PRACTITIONER

## 2021-08-06 RX ORDER — ACETAMINOPHEN 500 MG
1000 TABLET ORAL ONCE
Status: COMPLETED | OUTPATIENT
Start: 2021-08-06 | End: 2021-08-06

## 2021-08-06 RX ADMIN — ACETAMINOPHEN 1000 MG: 500 TABLET ORAL at 12:26

## 2021-08-06 ASSESSMENT — PAIN DESCRIPTION - LOCATION: LOCATION: HEAD

## 2021-08-06 ASSESSMENT — ENCOUNTER SYMPTOMS
SHORTNESS OF BREATH: 0
VOMITING: 0
NAUSEA: 0
CHEST TIGHTNESS: 0
ABDOMINAL PAIN: 0
WHEEZING: 0
TROUBLE SWALLOWING: 0
SORE THROAT: 0
COUGH: 0
BACK PAIN: 0
DIARRHEA: 0

## 2021-08-06 ASSESSMENT — PAIN SCALES - GENERAL
PAINLEVEL_OUTOF10: 5
PAINLEVEL_OUTOF10: 2
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
## 2022-09-28 ENCOUNTER — APPOINTMENT (OUTPATIENT)
Dept: GENERAL RADIOLOGY | Age: 47
DRG: 091 | End: 2022-09-28
Payer: MEDICARE

## 2022-09-28 ENCOUNTER — APPOINTMENT (OUTPATIENT)
Dept: CT IMAGING | Age: 47
DRG: 091 | End: 2022-09-28
Payer: MEDICARE

## 2022-09-28 ENCOUNTER — HOSPITAL ENCOUNTER (INPATIENT)
Age: 47
LOS: 1 days | Discharge: HOME HEALTH | DRG: 091 | End: 2022-09-29
Attending: EMERGENCY MEDICINE | Admitting: INTERNAL MEDICINE
Payer: MEDICARE

## 2022-09-28 DIAGNOSIS — R29.898 WEAKNESS OF BOTH LOWER EXTREMITIES: Primary | ICD-10-CM

## 2022-09-28 PROBLEM — I10 HTN (HYPERTENSION): Status: ACTIVE | Noted: 2022-09-28

## 2022-09-28 PROBLEM — E78.5 HLD (HYPERLIPIDEMIA): Chronic | Status: ACTIVE | Noted: 2022-09-28

## 2022-09-28 PROBLEM — E78.5 HLD (HYPERLIPIDEMIA): Status: ACTIVE | Noted: 2022-09-28

## 2022-09-28 PROBLEM — R29.5 TRANSIENT PARALYSIS: Status: ACTIVE | Noted: 2022-09-28

## 2022-09-28 PROBLEM — I10 HTN (HYPERTENSION): Chronic | Status: ACTIVE | Noted: 2022-09-28

## 2022-09-28 LAB
ALBUMIN SERPL-MCNC: 4.2 G/DL (ref 3.5–4.6)
ALP BLD-CCNC: 87 U/L (ref 40–130)
ALT SERPL-CCNC: 21 U/L (ref 0–33)
AMPHETAMINE SCREEN, URINE: NORMAL
AMPHETAMINE SCREEN, URINE: NORMAL
ANION GAP SERPL CALCULATED.3IONS-SCNC: 8 MEQ/L (ref 9–15)
APTT: 33.7 SEC (ref 24.4–36.8)
AST SERPL-CCNC: 16 U/L (ref 0–35)
BARBITURATE SCREEN URINE: NORMAL
BARBITURATE SCREEN URINE: NORMAL
BASOPHILS ABSOLUTE: 0.1 K/UL (ref 0–0.2)
BASOPHILS RELATIVE PERCENT: 1 %
BENZODIAZEPINE SCREEN, URINE: NORMAL
BENZODIAZEPINE SCREEN, URINE: NORMAL
BILIRUB SERPL-MCNC: <0.2 MG/DL (ref 0.2–0.7)
BUN BLDV-MCNC: 14 MG/DL (ref 6–20)
CALCIUM SERPL-MCNC: 8.7 MG/DL (ref 8.5–9.9)
CANNABINOID SCREEN URINE: NORMAL
CANNABINOID SCREEN URINE: NORMAL
CHLORIDE BLD-SCNC: 96 MEQ/L (ref 95–107)
CHP ED QC CHECK: NORMAL
CO2: 26 MEQ/L (ref 20–31)
COCAINE METABOLITE SCREEN URINE: NORMAL
COCAINE METABOLITE SCREEN URINE: NORMAL
CREAT SERPL-MCNC: 1.07 MG/DL (ref 0.5–0.9)
EKG ATRIAL RATE: 54 BPM
EKG P AXIS: 39 DEGREES
EKG P-R INTERVAL: 154 MS
EKG Q-T INTERVAL: 424 MS
EKG QRS DURATION: 98 MS
EKG QTC CALCULATION (BAZETT): 402 MS
EKG R AXIS: 43 DEGREES
EKG T AXIS: 28 DEGREES
EKG VENTRICULAR RATE: 54 BPM
EOSINOPHILS ABSOLUTE: 1.1 K/UL (ref 0–0.7)
EOSINOPHILS RELATIVE PERCENT: 10.2 %
ETHANOL PERCENT: NORMAL G/DL
ETHANOL: <10 MG/DL (ref 0–0.08)
FENTANYL SCREEN, URINE: NORMAL
GFR AFRICAN AMERICAN: >60
GFR AFRICAN AMERICAN: >60
GFR NON-AFRICAN AMERICAN: 53
GFR NON-AFRICAN AMERICAN: 54.8
GLOBULIN: 2.8 G/DL (ref 2.3–3.5)
GLUCOSE BLD-MCNC: 146 MG/DL (ref 70–99)
GLUCOSE BLD-MCNC: 150 MG/DL (ref 70–99)
GLUCOSE BLD-MCNC: 267 MG/DL
GLUCOSE BLD-MCNC: 267 MG/DL (ref 70–99)
GLUCOSE BLD-MCNC: 271 MG/DL (ref 70–99)
HCT VFR BLD CALC: 35.6 % (ref 37–47)
HEMOGLOBIN: 11.9 G/DL (ref 12–16)
INR BLD: 1.1
LYMPHOCYTES ABSOLUTE: 2.5 K/UL (ref 1–4.8)
LYMPHOCYTES RELATIVE PERCENT: 22.8 %
Lab: NORMAL
MCH RBC QN AUTO: 30.7 PG (ref 27–31.3)
MCHC RBC AUTO-ENTMCNC: 33.5 % (ref 33–37)
MCV RBC AUTO: 91.6 FL (ref 82–100)
METHADONE SCREEN, URINE: NORMAL
METHADONE SCREEN, URINE: NORMAL
MONOCYTES ABSOLUTE: 0.7 K/UL (ref 0.2–0.8)
MONOCYTES RELATIVE PERCENT: 6.1 %
NEUTROPHILS ABSOLUTE: 6.5 K/UL (ref 1.4–6.5)
NEUTROPHILS RELATIVE PERCENT: 59.9 %
OPIATE SCREEN URINE: NORMAL
OPIATE SCREEN URINE: NORMAL
OXYCODONE URINE: NORMAL
PDW BLD-RTO: 12.4 % (ref 11.5–14.5)
PERFORMED ON: ABNORMAL
PHENCYCLIDINE SCREEN URINE: NORMAL
PHENCYCLIDINE SCREEN URINE: NORMAL
PLATELET # BLD: 286 K/UL (ref 130–400)
POC CREATININE WHOLE BLOOD: 1.1
POC CREATININE: 1.1 MG/DL (ref 0.6–1.2)
POC SAMPLE TYPE: ABNORMAL
POTASSIUM REFLEX MAGNESIUM: 4.4 MEQ/L (ref 3.4–4.9)
PROPOXYPHENE SCREEN, URINE: NORMAL
PROPOXYPHENE SCREEN: NORMAL
PROTHROMBIN TIME: 14.1 SEC (ref 12.3–14.9)
RBC # BLD: 3.89 M/UL (ref 4.2–5.4)
SODIUM BLD-SCNC: 130 MEQ/L (ref 135–144)
TOTAL CK: 58 U/L (ref 0–170)
TOTAL PROTEIN: 7 G/DL (ref 6.3–8)
TSH REFLEX: 1.61 UIU/ML (ref 0.44–3.86)
UR OXYCODONE RAPID SCREEN: NORMAL
WBC # BLD: 10.8 K/UL (ref 4.8–10.8)

## 2022-09-28 PROCEDURE — 6370000000 HC RX 637 (ALT 250 FOR IP): Performed by: INTERNAL MEDICINE

## 2022-09-28 PROCEDURE — 82550 ASSAY OF CK (CPK): CPT

## 2022-09-28 PROCEDURE — 82077 ASSAY SPEC XCP UR&BREATH IA: CPT

## 2022-09-28 PROCEDURE — 70496 CT ANGIOGRAPHY HEAD: CPT

## 2022-09-28 PROCEDURE — 93010 ELECTROCARDIOGRAM REPORT: CPT | Performed by: INTERNAL MEDICINE

## 2022-09-28 PROCEDURE — 36415 COLL VENOUS BLD VENIPUNCTURE: CPT

## 2022-09-28 PROCEDURE — 92610 EVALUATE SWALLOWING FUNCTION: CPT

## 2022-09-28 PROCEDURE — 80053 COMPREHEN METABOLIC PANEL: CPT

## 2022-09-28 PROCEDURE — 85730 THROMBOPLASTIN TIME PARTIAL: CPT

## 2022-09-28 PROCEDURE — 96372 THER/PROPH/DIAG INJ SC/IM: CPT

## 2022-09-28 PROCEDURE — 84443 ASSAY THYROID STIM HORMONE: CPT

## 2022-09-28 PROCEDURE — 71045 X-RAY EXAM CHEST 1 VIEW: CPT

## 2022-09-28 PROCEDURE — 85610 PROTHROMBIN TIME: CPT

## 2022-09-28 PROCEDURE — G0378 HOSPITAL OBSERVATION PER HR: HCPCS

## 2022-09-28 PROCEDURE — 97166 OT EVAL MOD COMPLEX 45 MIN: CPT

## 2022-09-28 PROCEDURE — 80306 DRUG TEST PRSMV INSTRMNT: CPT

## 2022-09-28 PROCEDURE — 99285 EMERGENCY DEPT VISIT HI MDM: CPT

## 2022-09-28 PROCEDURE — 6370000000 HC RX 637 (ALT 250 FOR IP)

## 2022-09-28 PROCEDURE — 70498 CT ANGIOGRAPHY NECK: CPT

## 2022-09-28 PROCEDURE — 93005 ELECTROCARDIOGRAM TRACING: CPT | Performed by: EMERGENCY MEDICINE

## 2022-09-28 PROCEDURE — 6360000004 HC RX CONTRAST MEDICATION: Performed by: EMERGENCY MEDICINE

## 2022-09-28 PROCEDURE — 1210000000 HC MED SURG R&B

## 2022-09-28 PROCEDURE — 6360000002 HC RX W HCPCS

## 2022-09-28 PROCEDURE — 85025 COMPLETE CBC W/AUTO DIFF WBC: CPT

## 2022-09-28 PROCEDURE — 80307 DRUG TEST PRSMV CHEM ANLYZR: CPT

## 2022-09-28 RX ORDER — DEXTROSE MONOHYDRATE 100 MG/ML
INJECTION, SOLUTION INTRAVENOUS CONTINUOUS PRN
Status: DISCONTINUED | OUTPATIENT
Start: 2022-09-28 | End: 2022-09-29 | Stop reason: HOSPADM

## 2022-09-28 RX ORDER — INSULIN LISPRO 100 [IU]/ML
0-4 INJECTION, SOLUTION INTRAVENOUS; SUBCUTANEOUS NIGHTLY
Status: DISCONTINUED | OUTPATIENT
Start: 2022-09-28 | End: 2022-09-29 | Stop reason: HOSPADM

## 2022-09-28 RX ORDER — IBUPROFEN 400 MG/1
400 TABLET ORAL ONCE
Status: COMPLETED | OUTPATIENT
Start: 2022-09-28 | End: 2022-09-28

## 2022-09-28 RX ORDER — ASPIRIN 81 MG/1
81 TABLET ORAL DAILY
Status: DISCONTINUED | OUTPATIENT
Start: 2022-09-28 | End: 2022-09-29 | Stop reason: HOSPADM

## 2022-09-28 RX ORDER — PANTOPRAZOLE SODIUM 40 MG/1
40 TABLET, DELAYED RELEASE ORAL
Status: DISCONTINUED | OUTPATIENT
Start: 2022-09-29 | End: 2022-09-29 | Stop reason: HOSPADM

## 2022-09-28 RX ORDER — ATORVASTATIN CALCIUM 80 MG/1
80 TABLET, FILM COATED ORAL NIGHTLY
Status: DISCONTINUED | OUTPATIENT
Start: 2022-09-28 | End: 2022-09-29 | Stop reason: HOSPADM

## 2022-09-28 RX ORDER — INSULIN LISPRO 100 [IU]/ML
0-8 INJECTION, SOLUTION INTRAVENOUS; SUBCUTANEOUS
Status: DISCONTINUED | OUTPATIENT
Start: 2022-09-28 | End: 2022-09-29 | Stop reason: HOSPADM

## 2022-09-28 RX ORDER — ONDANSETRON 2 MG/ML
4 INJECTION INTRAMUSCULAR; INTRAVENOUS EVERY 6 HOURS PRN
Status: DISCONTINUED | OUTPATIENT
Start: 2022-09-28 | End: 2022-09-29 | Stop reason: HOSPADM

## 2022-09-28 RX ORDER — ONDANSETRON 4 MG/1
4 TABLET, ORALLY DISINTEGRATING ORAL EVERY 8 HOURS PRN
Status: DISCONTINUED | OUTPATIENT
Start: 2022-09-28 | End: 2022-09-29 | Stop reason: HOSPADM

## 2022-09-28 RX ORDER — FLUVOXAMINE MALEATE 50 MG/1
12.5 TABLET, COATED ORAL NIGHTLY
COMMUNITY

## 2022-09-28 RX ORDER — POLYETHYLENE GLYCOL 3350 17 G/17G
17 POWDER, FOR SOLUTION ORAL DAILY PRN
Status: DISCONTINUED | OUTPATIENT
Start: 2022-09-28 | End: 2022-09-29 | Stop reason: HOSPADM

## 2022-09-28 RX ORDER — NAPROXEN SODIUM 220 MG
CAPSULE ORAL AS NEEDED
COMMUNITY

## 2022-09-28 RX ORDER — AMITRIPTYLINE HYDROCHLORIDE 50 MG/1
25 TABLET, FILM COATED ORAL NIGHTLY
Status: DISCONTINUED | OUTPATIENT
Start: 2022-09-28 | End: 2022-09-29 | Stop reason: HOSPADM

## 2022-09-28 RX ORDER — GLIMEPIRIDE 2 MG/1
2 TABLET ORAL
Status: DISCONTINUED | OUTPATIENT
Start: 2022-09-29 | End: 2022-09-29 | Stop reason: HOSPADM

## 2022-09-28 RX ORDER — UBIDECARENONE 75 MG
50 CAPSULE ORAL NIGHTLY
COMMUNITY

## 2022-09-28 RX ORDER — ASPIRIN 300 MG/1
300 SUPPOSITORY RECTAL DAILY
Status: DISCONTINUED | OUTPATIENT
Start: 2022-09-28 | End: 2022-09-29 | Stop reason: HOSPADM

## 2022-09-28 RX ORDER — SEMAGLUTIDE 1.34 MG/ML
0.25 INJECTION, SOLUTION SUBCUTANEOUS WEEKLY
COMMUNITY

## 2022-09-28 RX ORDER — LABETALOL HYDROCHLORIDE 5 MG/ML
10 INJECTION, SOLUTION INTRAVENOUS EVERY 10 MIN PRN
Status: DISCONTINUED | OUTPATIENT
Start: 2022-09-28 | End: 2022-09-29 | Stop reason: HOSPADM

## 2022-09-28 RX ORDER — LOSARTAN POTASSIUM 25 MG/1
25 TABLET ORAL DAILY
Status: DISCONTINUED | OUTPATIENT
Start: 2022-09-28 | End: 2022-09-29 | Stop reason: HOSPADM

## 2022-09-28 RX ORDER — PIOGLITAZONEHYDROCHLORIDE 30 MG/1
30 TABLET ORAL DAILY
Status: DISCONTINUED | OUTPATIENT
Start: 2022-09-28 | End: 2022-09-29 | Stop reason: HOSPADM

## 2022-09-28 RX ORDER — ENOXAPARIN SODIUM 100 MG/ML
40 INJECTION SUBCUTANEOUS DAILY
Status: DISCONTINUED | OUTPATIENT
Start: 2022-09-28 | End: 2022-09-29 | Stop reason: HOSPADM

## 2022-09-28 RX ORDER — SERTRALINE HYDROCHLORIDE 100 MG/1
100 TABLET, FILM COATED ORAL DAILY
Status: DISCONTINUED | OUTPATIENT
Start: 2022-09-28 | End: 2022-09-29 | Stop reason: HOSPADM

## 2022-09-28 RX ORDER — OXCARBAZEPINE 150 MG/1
150 TABLET, FILM COATED ORAL 2 TIMES DAILY
Status: DISCONTINUED | OUTPATIENT
Start: 2022-09-28 | End: 2022-09-29 | Stop reason: HOSPADM

## 2022-09-28 RX ADMIN — IOPAMIDOL 100 ML: 612 INJECTION, SOLUTION INTRAVENOUS at 02:10

## 2022-09-28 RX ADMIN — IBUPROFEN 400 MG: 400 TABLET, FILM COATED ORAL at 22:19

## 2022-09-28 RX ADMIN — ENOXAPARIN SODIUM 40 MG: 40 INJECTION SUBCUTANEOUS at 14:32

## 2022-09-28 ASSESSMENT — ENCOUNTER SYMPTOMS
VOMITING: 0
ABDOMINAL PAIN: 0
RHINORRHEA: 0
SORE THROAT: 0
SHORTNESS OF BREATH: 0
CONSTIPATION: 0
TROUBLE SWALLOWING: 0
CHEST TIGHTNESS: 0
DIARRHEA: 0
NAUSEA: 0
PHOTOPHOBIA: 0
COUGH: 0
COLOR CHANGE: 0
ABDOMINAL DISTENTION: 0
BACK PAIN: 0

## 2022-09-28 ASSESSMENT — PAIN - FUNCTIONAL ASSESSMENT: PAIN_FUNCTIONAL_ASSESSMENT: NONE - DENIES PAIN

## 2022-09-28 ASSESSMENT — LIFESTYLE VARIABLES
HOW MANY STANDARD DRINKS CONTAINING ALCOHOL DO YOU HAVE ON A TYPICAL DAY: PATIENT DOES NOT DRINK
HOW OFTEN DO YOU HAVE A DRINK CONTAINING ALCOHOL: NEVER

## 2022-09-28 ASSESSMENT — PAIN SCALES - GENERAL: PAINLEVEL_OUTOF10: 5

## 2022-09-28 ASSESSMENT — PAIN DESCRIPTION - DESCRIPTORS: DESCRIPTORS: OTHER (COMMENT)

## 2022-09-28 ASSESSMENT — PAIN DESCRIPTION - LOCATION: LOCATION: KNEE

## 2022-09-28 NOTE — PROGRESS NOTES
[facility]   Facility/Department: [address]  Speech Language Pathology  Clinical Bedside Swallow Evaluation    NAME:Symone Reyes  : 1975 (52 y.o.)   [x]   confirmed    MRN: [id number]  [facility]  ADMISSION DATE: 2022  PATIENT DIAGNOSIS(ES): Transient paralysis [R29.5]  Weakness of both lower extremities [R29.898]  Chief Complaint   Patient presents with    Extremity Weakness     Bilateral leg \"paralysis\"     Patient Active Problem List    Diagnosis Date Noted    Transient paralysis 2022    HTN (hypertension) 2022    HLD (hyperlipidemia) 2022    DMII (diabetes mellitus, type 2) ([facility] [address] 2016    Hypotension 2016    Right arm pain 2014    Numbness 2014    Neck pain 2014    Weakness of right arm 2014    Smoker 2014    CTS (carpal tunnel syndrome) 2014     Past Medical History:   Diagnosis Date    Anxiety     Chronic back pain     Chronic neck pain     Depression     Diabetes mellitus ([facility] [address]     Hyperlipidemia     Hypertension     Osteoarthritis     Panic attack      Past Surgical History:   Procedure Laterality Date    ANKLE SURGERY Right     ANUS SURGERY      anal sphincter reconstruction    CYST REMOVAL Left     KNEE SURGERY Right     NERVE SURGERY N/A 2020    REPLACEMENT OF SPINAL CORD STIMULATOR GENERATOR performed by Denton Carlos MD at [facility]      spinal stimulator    OVARIAN CYST REMOVAL Right     TONSILLECTOMY      TUBAL LIGATION      and then reversal of tubal ligation     Allergies   Allergen Reactions    Decadron [Dexamethasone]     Guaifenesin & Derivatives     Influenza Vaccines     Morphine Hives    Topamax [Topiramate]        DATE ONSET: 22    Date of Evaluation: 2022   Evaluating Therapist: ANGIE Hicks    Dysphagia Diagnosis  Dysphagia Diagnosis: Swallow function appears WFL;No clinical indicators of dysphagia  Dysphagia Impression : Pt presents with functional swallow at bedside this date characterized by adequate mastication, A-P transfer, with no observed pocketing and good oral clearance on trials of regular solids and  thin liquids. Pt passed 3 oz swallow screen. No overt s/s of aspiration on trials of thin or regular. Hyolaryngeal movement present observed via palpation. Recommended Diet     Diet Solids Recommendation: Regular  Liquid Consistency Recommendation: Thin  Recommended Form of Meds: PO         Reason for Referral  Corina Jones was referred for a bedside swallow evaluation to assess the efficiency of her swallow function, identify signs and symptoms of aspiration, identify risk factors, and make recommendations regarding safe dietary consistencies, effective compensatory strategies, and safe eating environment. General  Chart Reviewed: Yes  Comments: Pt admitted with extremity weakness. Pt states she was unable to move arms and legs in the evening of 9/27. Pt denies any slurred speech or word finding. Per MD, pt is not suspicious for CVA or Guillian Voss  Behavior/Cognition: Alert; Cooperative;Pleasant mood  Respiratory Status: Room air  O2 Device: None (Room air)  Communication Observation: Functional  Follows Directions: Simple  Dentition: Some missing teeth  Patient Positioning: Upright in bed  Baseline Vocal Quality: Normal  Volitional Cough: Strong  Prior Dysphagia History: Pt denies; none pert chart review    Vision and Hearing  Vision  Vision Exceptions: Wears glasses at all times  Hearing  Hearing: Within functional limits    Current Diet level  Current Diet : NPO  Current Liquid Diet : NPO    Oral Motor  Labial: No impairment  Dentition: Natural  Oral Hygiene: Moist;Clean  Lingual: No impairment  Velum: No Impairment  Mandible: No impairment    Oral/Pharyngeal Phase  Oral Phase - Comment: WFL  Pharyngeal Phase: Suspected WFL    PO Trials  Neuromuscular Estim Used: No  Assessment Method(s): Observation  Patient Position: Upright in bed  Vocal Quality: No Impairment  Consistency Presented: Regular; Thin  How Presented: Self-fed/presented;Cup/gulp  How much presented:  (3 oz thin; 2 regular solid)  Bolus Acceptance: No impairment  Bolus Formation/Control: No impairment  Propulsion: No impairment  Oral Residue: Less than 10% of bolus (cleared with independent lingual sweep)  Initiation of Swallow: No impairment  Laryngeal Elevation:  (movement observed)  Aspiration Signs/Symptoms: None  Pharyngeal Phase Characteristics: Other (comment) (Pharyngeal dysphagia not suspected)      Dysphagia Diagnosis  Dysphagia Diagnosis: Swallow function appears WFL;No clinical indicators of dysphagia  Dysphagia Impression : Pt presents with functional swallow at bedside this date characterized by adequate mastication, A-P transfer, with no observed pocketing and good oral clearance on trials of regular solids and  thin liquids. Pt passed 3 oz swallow screen. No overt s/s of aspiration on trials of thin or regular. Hyolaryngeal movement present observed via palpation. Dysphagia Outcome Severity Scale: Level 7: Normal in all situations     Recommendations  Requires SLP Intervention: No  Diet Solids Recommendation: Regular  Liquid Consistency Recommendation: Thin  Recommended Form of Meds: PO      Education  Individuals consulted  Consulted and agree with results and recommendations: Patient;RN  RN Name: Jalen Lei St in place: Yes  Type of devices: All fall risk precautions in place    Pain Assessment  Patient does not c/o pain. Pain Re-assessment  Patient does not c/o pain.       Therapy Time  SLP Individual Minutes  Time In: 1330  Time Out: 8777  Minutes: 18              Signature: Electronically signed by ANGIE Sanchez on 9/28/2022 at 2:10 PM

## 2022-09-28 NOTE — PROGRESS NOTES
Physical Therapy Med Surg Initial Assessment  [facility]     NAME: Catie Donis  : 1975 (52 y.o.)  MRN: [id number]  CODE STATUS: Full Code    Date of Service: 2022    Patient Diagnosis(es): Transient paralysis [R29.5]  Weakness of both lower extremities [R29.898]   Chief Complaint   Patient presents with    Extremity Weakness     Bilateral leg \"paralysis\"     Patient Active Problem List    Diagnosis Date Noted    Transient paralysis 2022    HTN (hypertension) 2022    HLD (hyperlipidemia) 2022    DMII (diabetes mellitus, type 2) ([facility] [address] 2016    Hypotension 2016    Right arm pain 2014    Numbness 2014    Neck pain 2014    Weakness of right arm 2014    Smoker 2014    CTS (carpal tunnel syndrome) 2014      Past Medical History:   Diagnosis Date    Anxiety     Chronic back pain     Chronic neck pain     Depression     Diabetes mellitus ([facility] [address]     Hyperlipidemia     Hypertension     Osteoarthritis     Panic attack      Past Surgical History:   Procedure Laterality Date    ANKLE SURGERY Right     ANUS SURGERY      anal sphincter reconstruction    CYST REMOVAL Left     KNEE SURGERY Right     NERVE SURGERY N/A 2020    REPLACEMENT OF SPINAL CORD STIMULATOR GENERATOR performed by Sarah Cardoso MD at [facility]      spinal stimulator    OVARIAN CYST REMOVAL Right     TONSILLECTOMY      TUBAL LIGATION      and then reversal of tubal ligation     Chief Reason: General Medical  Chart Reviewed: Yes  Patient assessed for rehabilitation services?: Yes  Family / Caregiver Present: No    Restrictions:  Restrictions/Precautions: Fall Risk (per clinical judgement)     SUBJECTIVE:   Subjective: Denies pain. Pt reports that she woke at night and could not move extremities or head. Pt currently reporting poor LE movement.  State able to move UEs, head and ankles now    Pain   Pre treatment screening: denies  Post treatment screening: denies    Prior Level of Function:  Social/Functional History  Lives With: Alone  Type of Home: Apartment (community housing)  Home Layout: Two level (13 steps with handrail)  Home Access: Stairs to enter without rails  Entrance Stairs - Number of Steps: 1+1  Bathroom Shower/Tub: Tub/Shower unit  ADL Assistance: Independent  Homemaking Assistance: Independent  Homemaking Responsibilities: Yes  Ambulation Assistance: Independent (no AD)  Transfer Assistance: Independent  Active : No  Patient's  Info: LCT  Leisure & Hobbies: riding bike    OBJECTIVE:   Vision  Vision: Impaired  Vision Exceptions: Wears glasses at all times  Hearing: Within functional limits    Cognition:  Overall Orientation Status: Within Functional Limits  Orientation Level: Oriented to place, Oriented to time, Oriented to situation, Oriented to person  Follows Commands: Within Functional Limits  Overall Cognitive Status: WFL    Observation/Palpation  Observation: pleasant, cooperative, no acute distress noted, on RA    ROM:  RLE PROM: WNL  RLE General AROM: limited by strength deficits  LLE PROM: WFL;WNL  LLE General AROM: limited by strength deficits  AROM: Grossly decreased, non-functional  PROM: Generally decreased, functional  Spine  Cervical: WFL  Thoracic: WFL  Lumbar: WFL    Strength:  Strength RLE  Comment: functionally observed inconsistant performance during functional tasks  R Hip Flexion: 2+/5  R Hip ABduction: 2+/5  R Hip ADduction: 2+/5  R Knee Flexion: 4-/5 (pt actively resisted knee extension)  R Knee Extension: 4-/5  R Ankle Dorsiflexion: 4-/5  R Ankle Plantar flexion: 4-/5  Strength LLE  Comment: functionally observed inconsistant performance during functional tasks  L Hip Flexion: 2+/5  L Hip ABduction: 2+/5  L Hip ADduction: 2+/5  L Knee Flexion: 3+/5 (pt actively resisted knee flexion)  L Knee Extension: 4-/5 (pt actively resisted knee flexion)  L Ankle Dorsiflexion: 4-/5  L Ankle Plantar Flexion: 4-/5  Strength Other  Other: Core 3/5; inconsistent MMT in supine and sitting- pt resisting MMT at times and cogwheeling with intermittent MM contractions noted, inconsistent despite multiple trials in large mm groups- MMT does not correlate with observed functional strength  Strength: Grossly decreased, non-functional    Neuro:  Balance  Sitting - Static: Good  Sitting - Dynamic: Good (pt bends forward to adjust sock sitting EOB without LOB and good return to midline, sits long sit without difficulty)  Standing - Static: Fair (Min A with B LE blocked at first, progresses to no LE block)  Standing - Dynamic: Fair;-                    Tone: Normal  Coordination: Grossly decreased, non-functional    Sensation: Intact    Bed mobility  Supine to Sit: Stand by assistance  Sit to Supine: Stand by assistance  Bed Mobility Comments: observed x 2 trials, pt perfoms with LE movement and then requires UEs to move LEs during 2nd attempt, inconsistant performance    Transfers  Sit to Stand: Minimal Assistance  Stand to sit: Minimal Assistance  Comment: 2 person, B Knees blocked, lifting Min A with ability to progress to steadying Min A    Ambulation  Assistance: Unable to assess  Comments: pt demonstrates inconsistant performance of LE strength to safely ambulate beyond EOB    Activity Tolerance  Activity Tolerance: Patient tolerated evaluation without incident      Patient Education  Education Given To: Patient  Education Provided: Plan of Care;Role of Therapy; Fall Prevention Strategies  Education Method: Verbal  Barriers to Learning: None  Education Outcome: Verbalized understanding     ASSESSMENT:   Body Structures, Functions, Activity Limitations Requiring Skilled Therapeutic Intervention: Decreased functional mobility ; Decreased strength;Decreased balance  Decision Making: Medium Complexity  History: med  Exam: med  Clinical Presentation: med    Specific Instructions for Next Treatment: present pt with functional task  Therapy Prognosis: Good  Barriers to Learning: none     DISCHARGE RECOMMENDATIONS:  Discharge Recommendations: Continue to assess pending progress  Assessment: Pt demonstrates the above deficits and decline in functional mobility status placing them at increased risk for falls. Pt demonstrates inconsistent MMT and observed functional movement of LEs. Pt would benefit from physical therapy to address above deficits and allow for safe return home at highest level of function, decrease risk for falls, and improve QOL. Requires PT Follow-Up: Yes       PLAN OF CARE:  Plan  Plan: 1 time a day 3-6 times a week  Specific Instructions for Next Treatment: present pt with functional task  Current Treatment Recommendations: Strengthening, ROM, Balance training, Functional mobility training, Transfer training, Gait training, Stair training, Patient/Caregiver education & training, Neuromuscular re-education, Equipment evaluation, education, & procurement, Home exercise program, Safety education & training, Therapeutic activities    Safety Devices  Type of Devices: Bed alarm in place, Call light within reach, Nurse notified, Left in bed    Goals:  Patient goals : pt does not state  Long Term Goals  Long term goal 1: Bed mobility with indep  Long term goal 2: Functional transfers with indep  Long term goal 3: Progress pregait and gait when able with AD PRN distances in room ~30 ft  Long term goal 4: Pt will progress to stairs when appropriate with 1 person assist    [facility] ([address] [place] [address]) [address] Mobility Raw Score : 12     Therapy Time:   Individual   Time In 1310   Time Out 1330   Minutes [address], 09/28/22 at 1:53 PM         Definitions for assistance levels  Independent = pt does not require any physical supervision or assistance from another person for activity completion. Device may be needed.   Stand by assistance = pt requires verbal cues or instructions from another person, close to but not touching, to perform the activity  Minimal assistance= pt performs 75% or more of the activity; assistance is required to complete the activity  Moderate assistance= pt performs 50% of the activity; assistance is required to complete the activity  Maximal assistance = pt performs 25% of the activity; assistance is required to complete the activity  Dependent = pt requires total physical assistance to accomplish the task

## 2022-09-28 NOTE — PROGRESS NOTES
NEURO CONSULT CALLED TO DR PORRAS VIA ANSWERING SERVICE Electronically signed by Guadalupe Kerr on 9/28/2022 at 2:36 PM

## 2022-09-28 NOTE — PROGRESS NOTES
[facility] OCCUPATIONAL THERAPY EVALUATION - ACUTE     NAME: Dahiana Treviño  : 1975 (52 y.o.)  MRN: [id number]  CODE STATUS: Full Code  Room: [place] [id number]    Date of Service: 2022    Patient Diagnosis(es): Transient paralysis [R29.5]  Weakness of both lower extremities [R29.898]   Patient Active Problem List    Diagnosis Date Noted    Transient paralysis 2022    HTN (hypertension) 2022    HLD (hyperlipidemia) 2022    DMII (diabetes mellitus, type 2) ([facility] [address] 2016    Hypotension 2016    Right arm pain 2014    Numbness 2014    Neck pain 2014    Weakness of right arm 2014    Smoker 2014    CTS (carpal tunnel syndrome) 2014        Past Medical History:   Diagnosis Date    Anxiety     Chronic back pain     Chronic neck pain     Depression     Diabetes mellitus ([facility] [address]     Hyperlipidemia     Hypertension     Osteoarthritis     Panic attack      Past Surgical History:   Procedure Laterality Date    ANKLE SURGERY Right     ANUS SURGERY      anal sphincter reconstruction    CYST REMOVAL Left     KNEE SURGERY Right     NERVE SURGERY N/A 2020    REPLACEMENT OF SPINAL CORD STIMULATOR GENERATOR performed by Ted Macario MD at . [facility]      spinal stimulator    OVARIAN CYST REMOVAL Right     TONSILLECTOMY      TUBAL LIGATION      and then reversal of tubal ligation        Restrictions  Restrictions/Precautions: Fall Risk              Safety Devices: Safety Devices  Type of Devices: All fall risk precautions in place     Patient's date of birth confirmed: Yes    General:  Patient assessed for rehabilitation services?: Yes    Subjective      \"I cant lift my legs. \"    Pain at start of treatment: No    Pain at end of treatment: No      Prior Level of Function:  Social/Functional History  Lives With: Alone  Type of Home: Apartment  Home Layout: Two level (13-14 steps with HR)  Home Access: Stairs to enter without rails  Entrance Stairs - Number of Steps: 1+1  Bathroom Shower/Tub: Tub/Shower unit  ADL Assistance: Independent  Homemaking Assistance: Independent  Homemaking Responsibilities: Yes  Ambulation Assistance: Independent  Transfer Assistance: Independent  Active : No  Patient's  Info: LCT  Leisure & Hobbies: riding bike  IADL Comments: previouly completed all independent    OBJECTIVE:     Orientation Status:  Orientation  Overall Orientation Status: Within Functional Limits    Observation:  Observation/Palpation  Observation: pleasant, cooperative, no acute distress noted, on RA    Cognition Status:  Cognition  Overall Cognitive Status: WFL    Perception Status:       Vision and Hearing Status:  Vision  Vision Exceptions: Wears glasses at all times  Hearing  Hearing: Within functional limits        GROSS ASSESSMENT AROM/PROM:  AROM: Within functional limits       ROM:   LUE AROM (degrees)  LUE AROM : WFL  Left Hand AROM (degrees)  Left Hand AROM: WFL  RUE AROM (degrees)  RUE AROM : WFL  Right Hand AROM (degrees)  Right Hand AROM: WFL    UE STRENGTH:  Strength: Within functional limits    UE COORDINATION:  Coordination: Within functional limits    UE TONE:  Tone: Normal    UE SENSATION:  Sensation: Intact    Hand Dominance:  Hand Dominance  Hand Dominance: Right    ADL Status:  ADL  Feeding: NPO  Feeding Skilled Clinical Factors: NPO upon eval  Grooming: Stand by assistance  UE Bathing: Stand by assistance  LE Bathing: Moderate assistance  UE Dressing: Stand by assistance  LE Dressing: Moderate assistance  Toileting: Moderate assistance  Additional Comments: simulated ADL seated EOB. levels as anticipated  Toilet Transfers  Toilet Transfer:  Moderate assistance  Toilet Transfers Comments: anticipated level    Functional Mobility:    Transfers  Sit to stand: Minimal assistance, 2 Person assistance  Stand to sit: Minimal assistance, 2 Person assistance  Transfer Comments: B knees blocked    Patient ambulated NT due to safety Bed Mobility  Bed mobility  Supine to Sit: Stand by assistance  Sit to Supine: Stand by assistance    Seated and Standing Balance:  Balance  Sitting: Intact  Standing: Impaired (Oly with BUE support and posterior BLE supported on bed)    Functional Endurance:  Activity Tolerance  Activity Tolerance: Patient Tolerated treatment well    D/C Recommendations:  OT D/C RECOMMENDATIONS  REQUIRES OT FOLLOW-UP: Yes    Equipment Recommendations:  OT Equipment Recommendations  Other: continue to assess    OT Education:   Patient Education  Education Given To: Patient  Education Provided: Role of Therapy;Plan of Care    OT Follow Up:   OT D/C RECOMMENDATIONS  REQUIRES OT FOLLOW-UP: Yes       Assessment/Discharge Disposition:  Assessment: Pt is a 52 yr old female presenting to [facility] with the above functional deficits.  Pt would benefit from occupational therapy services to maximize safety and independence with ADL tasks, improve overall strength/endurance and balance to return to OF  Performance deficits / Impairments: Decreased functional mobility , Decreased safe awareness, Decreased balance, Decreased ADL status, Decreased endurance, Decreased strength, Decreased high-level IADLs  Prognosis: Good  Discharge Recommendations: Continue to assess pending progress  Decision Making: Medium Complexity  History: mod complex  Exam: 7 perf deficits  Assistance / Modification: modA    AMPAC (Six Click) Self care Score   How much help for putting on and taking off regular lower body clothing?: A Lot  How much help for Bathing?: A Lot  How much help for Toileting?: A Lot  How much help for putting on and taking off regular upper body clothing?: A Little  How much help for taking care of personal grooming?: A Little  How much help for eating meals?: Total  AM-PAC Inpatient Daily Activity Raw Score: 13  AM-PAC Inpatient ADL T-Scale Score : 32.03  ADL Inpatient CMS 0-100% Score: 63.03    Therapy key for assistance levels - Independent/Mod I = Pt. is able to perform task with no assistance but may require a device   Stand by assistance = Pt. does not perform task at an independent level but does not need physical assistance, requires verbal cues  Minimal, Moderate, Maximal Assistance = Pt. requires physical assistance (25%, 50%, 75% assist from helper) for task but is able to actively participate in task   Dependent = Pt. requires total assistance with task and is not able to actively participate with task completion     Plan:  Plan  Times per Week: 1-4x/wk  Plan Weeks: length of acute stay  Current Treatment Recommendations: Strengthening, ROM, Balance training, Functional mobility training, Endurance training, Safety education & training, Patient/Caregiver education & training, Equipment evaluation, education, & procurement, Self-Care / ADL, Home management training    Goals:   Patient will:    - Improve functional endurance to tolerate/complete 30 mins of ADL's  - Be DIONE in UB ADLs   - Be Carlee in LB ADLs  - Be CGA in ADL transfers without LOB  - Be SBA in toileting tasks  - Improve B UE strength and endurance to 5/5 in order to participate in self-care activities as projected. - Access appropriate D/C site with as few architectural barriers as possible.     Patient Goal: Patient goals : to return home when able     Discussed and agreed upon: Yes Comments:       Therapy Time:   Individual   Time In 1310   Time Out 1330   Minutes 20          Eval: 20 minutes     Electronically signed by:    Valentino Catena, OT,   9/28/2022, 3:27 PM

## 2022-09-28 NOTE — ED PROVIDER NOTES
[address] [facility]  EMERGENCY DEPARTMENT ENCOUNTER      Pt Name: Douglas Evans  MRN: [id number]  [place] 1975  Date of evaluation: 9/28/2022  Provider: Louis Palencia [id number]       Chief Complaint   Patient presents with    Extremity Weakness     Bilateral leg \"paralysis\"         HISTORY OF PRESENT ILLNESS   (Location/Symptom, Timing/Onset, Context/Setting, Quality, Duration, Modifying Factors, Severity)  Note limiting factors. Douglas Evans is a 52 y.o. female who presents to the emergency department via EMS with difficulty walking. History of diabetes, chronic back pain, depression, hypertension, hyperlipidemia, tobacco dependence, anxiety, previous cervical injury requiring requiring spinal cord stimulator. The patient presents with weakness to her legs, inability to walk. She says that she had something similar in the past when awakening from anesthesia. She says that she was feeling fine earlier today, was doing the dishes and felt slightly lightheaded. The patient went to take a nap. Last known well midnight. She says that she had a bad dream, awoke from the dream, was able to speak and was alert but could not move any of her extremities. She said \" Lyric call 911\". Upon presenting to the emergency department she is able to move her arms but is unable to lift her legs off the bed. She is able to plantarflex. She has no sensory subjective deficits. Does not endorse vision change, fever, neck stiffness, chest pain or difficulty breathing, vomiting, saddle anesthesia, urinary or bowel changes. She denies any recent illness, congestion or cough, vomiting or diarrhea, dysuria. HPI    Nursing Notes were reviewed. REVIEW OF SYSTEMS    (2-9 systems for level 4, 10 or more for level 5)     Review of Systems   Constitutional:  Negative for fever. Eyes:  Negative for photophobia and visual disturbance. Cardiovascular:  Negative for leg swelling.    Gastrointestinal:  Negative for abdominal pain and vomiting. Neurological:  Positive for weakness. Negative for dizziness, facial asymmetry and numbness. Psychiatric/Behavioral:  Negative for confusion. Except as noted above the remainder of the review of systems was reviewed and negative. PAST MEDICAL HISTORY     Past Medical History:   Diagnosis Date    Anxiety     Chronic back pain     Chronic neck pain     Depression     Diabetes mellitus ([address]     Hyperlipidemia     Hypertension     Osteoarthritis     Panic attack          SURGICAL HISTORY       Past Surgical History:   Procedure Laterality Date    ANKLE SURGERY Right     ANUS SURGERY      anal sphincter reconstruction    CYST REMOVAL Left     KNEE SURGERY Right     NERVE SURGERY N/A 12/24/2020    REPLACEMENT OF SPINAL CORD STIMULATOR GENERATOR performed by Josiah Plasencia MD at [place] [address]      spinal stimulator    OVARIAN CYST REMOVAL Right     TONSILLECTOMY      TUBAL LIGATION      and then reversal of tubal ligation         CURRENT MEDICATIONS       Previous Medications    ALBUTEROL SULFATE HFA (PROAIR HFA) 108 (90 BASE) MCG/ACT INHALER    Use every 4 hours while awake for 7-10 days then PRN wheezing  Dispense with SPACER and Instruct on use. May sub Ventolin or Proventil as needed per [employer].     AMITRIPTYLINE (ELAVIL) 25 MG TABLET    Take 25 mg by mouth nightly    ATORVASTATIN (LIPITOR) 20 MG TABLET    Take 20 mg by mouth nightly    BENZONATATE (TESSALON PERLES) 100 MG CAPSULE    Take 2 capsules by mouth 3 times daily as needed for Cough    BUSPIRONE (BUSPAR) 15 MG TABLET    Take 15 mg by mouth nightly    CALCIUM CARBONATE 500 MG CHEW    Take 1 tablet by mouth every morning    GABAPENTIN (NEURONTIN) 300 MG CAPSULE    Take 600 mg by mouth 4 times daily 600mg @6am, 2pm, 6pm, 300mg @10pm     GLIMEPIRIDE (AMARYL) 4 MG TABLET    Take 2 mg by mouth every morning (before breakfast)     LOSARTAN (COZAAR) 25 MG TABLET    Take 25 mg by mouth daily METFORMIN (GLUCOPHAGE) 500 MG TABLET    Take 1 tablet by mouth 2 times daily (with meals)    MULTIPLE VITAMINS-MINERALS (MULTIVITAMIN ADULT PO)    Take 1 capsule by mouth    OMEPRAZOLE (PRILOSEC) 10 MG DELAYED RELEASE CAPSULE    Take 10 mg by mouth daily    OXCARBAZEPINE (TRILEPTAL) 150 MG TABLET    Take 150 mg by mouth 2 times daily    OXYCODONE HCL (OXYCONTIN PO)    Take 5 mg by mouth every 4 hours as needed    PIOGLITAZONE (ACTOS) 30 MG TABLET    Take 30 mg by mouth daily    SERTRALINE (ZOLOFT) 100 MG TABLET    Take 100 mg by mouth daily    TIZANIDINE (ZANAFLEX) 2 MG TABLET    Take 2 mg by mouth 4 times daily 2mg @6am, 2mg @2pm, 1mg at 6pm 4mg [email or web address].       ALLERGIES     Decadron [dexamethasone], Guaifenesin & derivatives, Influenza vaccines, Morphine, and Topamax [topiramate]    FAMILY HISTORY     No family history on file. SOCIAL HISTORY       Social History     Socioeconomic History    Marital status: Single   Tobacco Use    Smoking status: Every Day     Packs/day: 0.50     Years: 20.00     Pack years: 10.00     Types: Cigarettes    Smokeless tobacco: Never   Vaping Use    Vaping Use: Never used   Substance and Sexual Activity    Alcohol use: Yes     Comment: rarely    Drug use: No    Sexual activity: Not Currently       SCREENINGS   NIH Stroke Scale  Interval: Baseline  Level of Consciousness (1a): Alert  LOC Questions (1b): Answers both correctly  LOC Commands (1c): Performs both tasks correctly  Best Gaze (2): Normal  Visual (3): No visual loss  Facial Palsy (4): Normal symmetrical movement  Motor Arm, Left (5a): No drift  Motor Arm, Right (5b):  No drift  Motor Leg, Left (6a): No effort against gravity, limb falls  Motor Leg, Right (6b): No effort against gravity, limb falls  Limb Ataxia (7): Absent  Sensory (8): Normal  Best Language (9): No aphasia  Dysarthria (10): Normal  Extinction and Inattention (11): No abnormality  Total: 6     [place] Coma Scale  Eye Opening: Spontaneous  Best Verbal Response: Oriented  Best Motor Response: Obeys commands  [place] Coma Scale Score: 15                     CIWA Assessment  BP: (!) 166/74  Heart Rate: 59                 PHYSICAL EXAM    (up to 7 for level 4, 8 or more for level 5)     ED Triage Vitals   BP Temp Temp src Pulse Resp SpO2 Height Weight   -- -- -- -- -- -- -- --       Physical Exam  Constitutional:       Appearance: She is obese. She is not toxic-appearing or diaphoretic. Comments: No facial droop. No upper extremity pronator drift. She is able to sit up in bed. She will move her bilateral legs minimally side to side however she will not lift them off the bed and they immediately fall to gravity. She is able to plantarflex but is not able to dorsiflex. Her sensation is intact, DP intact. Warm perfused bilateral lower extremities. Difficult to elicit patellar reflex. She has no overlying midline vertebral tenderness or skin change   HENT:      Head: Normocephalic and atraumatic. Nose: Nose normal.      Mouth/Throat:      Mouth: Mucous membranes are moist.   Eyes:      Extraocular Movements: Extraocular movements intact. Pupils: Pupils are equal, round, and reactive to light. Cardiovascular:      Rate and Rhythm: Normal rate and regular rhythm. Pulses: Normal pulses. Pulmonary:      Effort: Pulmonary effort is normal.      Breath sounds: Normal breath sounds. Abdominal:      General: There is no distension. Tenderness: There is no abdominal tenderness. There is no guarding. Genitourinary:     Comments: Chaperone present, no decreased rectal sensation. Good rectal tone. Musculoskeletal:         General: No tenderness. Cervical back: Normal range of motion. No rigidity. Right lower leg: No edema. Left lower leg: No edema. Skin:     Capillary Refill: Capillary refill takes less than 2 seconds. Findings: No rash. Neurological:      General: No focal deficit present.       Mental Status: She is alert and oriented to person, place, and time. Sensory: No sensory deficit. Psychiatric:         Mood and Affect: Mood normal.       DIAGNOSTIC RESULTS     EKG: All EKG's are interpreted by the Emergency Department Physician who either signs or Co-signs this chart in the absence of a cardiologist.    Sinus bradycardia, rate 54, normal axis, normal intervals, , nonspecific T wave change, no STEMI. Similar compared to previous    RADIOLOGY:   Non-plain film images such as CT, Ultrasound and MRI are read by the radiologist. Plain radiographic images are visualized and preliminarily interpreted by the emergency physician with the below findings:        Interpretation per the Radiologist below, if available at the time of this note:    No dissection or clinically significant stenosis to CTA of the head or neck    XR CHEST PORTABLE   Final Result   No acute disease. RECOMMENDATION:   Careful clinical correlation and follow up recommended.          CTA HEAD W WO CONTRAST    (Results Pending)   CTA NECK W WO CONTRAST    (Results Pending)         ED BEDSIDE ULTRASOUND:   Performed by ED Physician - none    LABS:  Labs Reviewed   CBC WITH AUTO DIFFERENTIAL - Abnormal; Notable for the following components:       Result Value    RBC 3.89 (*)     Hemoglobin 11.9 (*)     Hematocrit 35.6 (*)     Eosinophils Absolute 1.1 (*)     All other components within normal limits   COMPREHENSIVE METABOLIC PANEL W/ REFLEX TO MG FOR LOW K - Abnormal; Notable for the following components:    Sodium 130 (*)     Anion Gap 8 (*)     Glucose 271 (*)     Creatinine 1.07 (*)     GFR Non- 54.8 (*)     All other components within normal limits   POCT GLUCOSE - Abnormal; Notable for the following components:    POC Glucose 267 (*)     All other components within normal limits   POCT GLUCOSE - Normal   POCT CREATININE - Normal   PROTIME-INR   APTT   URINE DRUG SCREEN   ETHANOL   TSH WITH REFLEX   CK   URINE DRUG SCREEN, COMPREHENSIVE       All other labs were within normal range or not returned as of this dictation. EMERGENCY DEPARTMENT COURSE and DIFFERENTIAL DIAGNOSIS/MDM:   Vitals:    Vitals:    09/28/22 0300 09/28/22 0330 09/28/22 0345 09/28/22 0430   BP: (!) 157/111 (!) 144/72 (!) 159/93 (!) 166/74   Pulse: (!) 46 (!) 46  59   Resp:       SpO2: 97% 97% 98% 96%   Weight:       Height:           Hyperglycemia w/o DKA  LKW 12/28 0000, however symmetric leg weakness is not highly suspicious for CVA- tpa not indicated  MDM  Patient does not have her spinal stimulator card, she is unsure if it is MRI compliant, unable to retrieve this information from the chart, MRI canceled  This seemed fairly abruptly onset rather than progressing gradually. This makes me highly doubt Guillain-Barré syndrome. Initially speaking with patient about the possibility of lumbar puncture, she refused. No fever or IV drug use, I doubt epidural abscess. Will require neurology evaluation for further investigation of possible myelitis  To see neurology on the floor    REASSESSMENT            CONSULTS:  None    PROCEDURES:  Unless otherwise noted below, none     Procedures        FINAL IMPRESSION      1. Weakness of both lower extremities          DISPOSITION/PLAN   DISPOSITION Decision To Admit 09/28/2022 02:23:07 AM      PATIENT REFERRED TO:  No follow-up provider specified. DISCHARGE MEDICATIONS:  New Prescriptions    No medications on file     Controlled Substances Monitoring:     No flowsheet data found.     (Please note that portions of this note were completed with a voice recognition program.  Efforts were made to edit the dictations but occasionally words are mis-transcribed.)    Brad Frost MD (electronically signed)  Attending Emergency Physician            Brad Frost MD  09/28/22 9253

## 2022-09-28 NOTE — ED TRIAGE NOTES
Patient arrived to ED viz ems with c/o inability to move both lower extremities and her neck. Patient endorses using bleach to clean her apartment last evening and feels that may be a reason for her symptoms.

## 2022-09-28 NOTE — PROGRESS NOTES
RN assessed pt, alert and oriented x4. Call light within reach. NIHHS scale assessed pt at this time was unable to hold legs up off the bed and fell to gravity. No upper body weakness at this time. VSS, RN will continue to monitor.     Electronically signed by Sundeep Guadarrama RN on 9/28/2022 at 1:20 PM

## 2022-09-28 NOTE — PLAN OF CARE
Problem: Chronic Conditions and Co-morbidities  Goal: Patient's chronic conditions and co-morbidity symptoms are monitored and maintained or improved  Outcome: Progressing  Flowsheets (Taken 9/28/2022 1245)  Care Plan - Patient's Chronic Conditions and Co-Morbidity Symptoms are Monitored and Maintained or Improved: Monitor and assess patient's chronic conditions and comorbid symptoms for stability, deterioration, or improvement     Problem: Safety - Adult  Goal: Free from fall injury  Outcome: Progressing     Problem: SLP Adult - Impaired Swallowing  Goal: By Discharge: Advance to least restrictive diet without signs or symptoms of aspiration for planned discharge setting. See evaluation for individualized goals.   9/28/2022 1410 by ANGIE Gauthier  Outcome: Progressing

## 2022-09-28 NOTE — ED NOTES
Pt sleeping but easily awakens. Skin pink w/d resp non labored     Jose Martin Gravely.  Pilar Regalado RN  09/28/22 4640

## 2022-09-28 NOTE — H&P
[place] [address] MEDICINE    HISTORY AND PHYSICAL EXAM    PATIENT NAME:  Florida Hodges    MRN:  [id number]  SERVICE DATE:  9/28/2022   SERVICE TIME:  6:01 AM    Primary Care Physician: Sarah Graham DO     SUBJECTIVE  CHIEF COMPLAINT:  Extremity Weakness (Bilateral leg \"paralysis\")       HPI: Florida Hodges is a 52 y.o. female  has a past medical history of Anxiety, Chronic back pain, Chronic neck pain, Depression, Diabetes mellitus ([address] Hyperlipidemia, Hypertension, Osteoarthritis, and Panic attack. that is being admitted for Transient paralysis. Shahnaz Arceo that she had a headache after cleaning and using bleach yesterday. (The headache has since resolved). On Monday she had an injection in her right knee and for the first time the doctor used lidocaine and ultrasound guidance. She states that he put it in to her upper knee instead of the lower like usually. She was dizzy after the lidocaine. She sat there for a while and waited until it passed. Last night she went to bed early, and put a heating pad on her knee, took her nightly medications. At 10:30 she went to sleep. She was dreaming and felt like she was sitting up and had pressure on her sacrum. And when she realized it was a dream, she woke herself up but couldn't move her legs or arms or head. She waited for it to pass and was able to move arms first. It took about a half hour for her to be able to move her arms. After a few minutes she could move her head from side to side but couldn't lift her head up and called 911 from her voice control. Patient is right handed. Patient recently had a dose of lidocaine with her injection. The last time she had these symptoms she was also given lidocaine.          HPI    PAST MEDICAL HISTORY:    Past Medical History:   Diagnosis Date    Anxiety     Chronic back pain     Chronic neck pain     Depression     Diabetes mellitus (HCC)     Hyperlipidemia     Hypertension     Osteoarthritis     Panic attack      PAST SURGICAL HISTORY:    Past Surgical History:   Procedure Laterality Date    ANKLE SURGERY Right     ANUS SURGERY      anal sphincter reconstruction    CYST REMOVAL Left     KNEE SURGERY Right     NERVE SURGERY N/A 12/24/2020    REPLACEMENT OF SPINAL CORD STIMULATOR GENERATOR performed by Sarah Cardoso MD at [address]  [facility]      spinal stimulator    OVARIAN CYST REMOVAL Right     TONSILLECTOMY      TUBAL LIGATION      and then reversal of tubal ligation     FAMILY HISTORY:  No family history on file. SOCIAL HISTORY:    Social History     Socioeconomic History    Marital status: Single     Spouse name: Not on file    Number of children: Not on file    Years of education: Not on file    Highest education level: Not on file   Occupational History    Not on file   Tobacco Use    Smoking status: Every Day     Packs/day: 0.50     Years: 20.00     Pack years: 10.00     Types: Cigarettes    Smokeless tobacco: Never   Vaping Use    Vaping Use: Never used   Substance and Sexual Activity    Alcohol use: Yes     Comment: rarely    Drug use: No    Sexual activity: Not Currently   Other Topics Concern    Not on file   Social History Narrative    Not on file     Social Determinants of Health     Financial Resource Strain: Not on file   Food Insecurity: Not on file   Transportation Needs: Not on file   Physical Activity: Not on file   Stress: Not on file   Social Connections: Not on file   Intimate Partner Violence: Not on file   Housing Stability: Not on file     MEDICATIONS:   Prior to Admission medications    Medication Sig Start Date End Date Taking?  Authorizing Provider   Calcium Carbonate 500 MG CHEW Take 1 tablet by mouth every morning    Historical Provider, MD   Multiple Vitamins-Minerals (MULTIVITAMIN ADULT PO) Take 1 capsule by mouth    Historical Provider, MD   OXcarbazepine (TRILEPTAL) 150 MG tablet Take 150 mg by mouth 2 times daily    Historical Provider, MD   benzonatate (TESSALON PERLES) 100 MG capsule Take 2 capsules by mouth 3 times daily as needed for Cough 6/10/18   Eddie Burks PA-C   albuterol sulfate HFA (PROAIR HFA) 108 (90 Base) MCG/ACT inhaler Use every 4 hours while awake for 7-10 days then PRN wheezing  Dispense with SPACER and Instruct on use. May sub Ventolin or Proventil as needed per [facility]. 6/10/18   Eddie Burks PA-C   OxyCODONE HCl (OXYCONTIN PO) Take 5 mg by mouth every 4 hours as needed    Historical Provider, MD   sertraline (ZOLOFT) 100 MG tablet Take 100 mg by mouth daily    Historical Provider, MD   busPIRone (BUSPAR) 15 MG tablet Take 15 mg by mouth nightly    Historical Provider, MD   glimepiride (AMARYL) 4 MG tablet Take 2 mg by mouth every morning (before breakfast)     Historical Provider, MD   pioglitazone (ACTOS) 30 MG tablet Take 30 mg by mouth daily    Historical Provider, MD   losartan (COZAAR) 25 MG tablet Take 25 mg by mouth daily    Historical Provider, MD   atorvastatin (LIPITOR) 20 MG tablet Take 20 mg by mouth nightly    Historical Provider, MD   metFORMIN (GLUCOPHAGE) 500 MG tablet Take 1 tablet by mouth 2 times daily (with meals) 12/25/16   Pawel Ortega MD   amitriptyline (ELAVIL) 25 MG tablet Take 25 mg by mouth nightly    Historical Provider, MD   tiZANidine (ZANAFLEX) 2 MG tablet Take 2 mg by mouth 4 times daily 2mg @6am, 2mg @2pm, 1mg at 6pm 4mg [email or web address].    Historical Provider, MD   omeprazole (PRILOSEC) 10 MG delayed release capsule Take 10 mg by mouth daily    Historical Provider, MD   gabapentin (NEURONTIN) 300 MG capsule Take 600 mg by mouth 4 times daily 600mg @6am, 2pm, 6pm, 300mg @10pm     Historical Provider, MD       ALLERGIES: Decadron [dexamethasone], Guaifenesin & derivatives, Influenza vaccines, Morphine, and Topamax [topiramate]    REVIEW OF SYSTEM:   Review of Systems   Constitutional:  Negative for chills, fatigue and fever. HENT:  Negative for congestion, rhinorrhea, sore throat and trouble swallowing. Eyes:  Negative for photophobia and visual disturbance. Respiratory:  Negative for cough, chest tightness and shortness of breath. Cardiovascular:  Negative for chest pain, palpitations and leg swelling. Gastrointestinal:  Negative for abdominal distention, abdominal pain, constipation, diarrhea, nausea and vomiting. Genitourinary:  Negative for difficulty urinating, flank pain and hematuria. Musculoskeletal:  Positive for neck stiffness. Negative for back pain, gait problem and joint swelling. Arthritis in knees, R>L   Skin:  Negative for color change and wound. Neurological:  Positive for headaches. Negative for dizziness, syncope, speech difficulty, weakness, light-headedness and numbness. Psychiatric/Behavioral:  Negative for behavioral problems and confusion. OBJECTIVE  PHYSICAL EXAM: BP (!) 178/90   Pulse (!) 49   Resp 17   Ht 5' 2\" (1.575 m)   Wt 188 lb (85.3 kg)   LMP 03/31/2019   SpO2 97%   BMI 34.39 kg/m²   Physical Exam  Vitals and nursing note reviewed. Constitutional:       General: She is awake. She is not in acute distress. Appearance: Normal appearance. She is well-developed and normal weight. She is not ill-appearing, toxic-appearing or diaphoretic. HENT:      Head: Normocephalic and atraumatic. Nose: Nose normal. No congestion or rhinorrhea. Mouth/Throat:      Lips: Pink. Mouth: Mucous membranes are moist.      Tongue: Tongue does not deviate from midline. Pharynx: Oropharynx is clear. No oropharyngeal exudate or posterior oropharyngeal erythema. Eyes:      General: Lids are normal. No visual field deficit or scleral icterus. Extraocular Movements: Extraocular movements intact. Right eye: No nystagmus. Left eye: No nystagmus. Conjunctiva/sclera: Conjunctivae normal.      Pupils: Pupils are equal, round, and reactive to light. Neck:      Thyroid: No thyromegaly. Vascular: No JVD.       Trachea: Trachea and phonation normal.   Cardiovascular:      Rate and Rhythm: Normal rate and regular rhythm. Pulses: Normal pulses. Radial pulses are 2+ on the right side and 2+ on the left side. Dorsalis pedis pulses are 2+ on the right side and 2+ on the left side. Heart sounds: Normal heart sounds, S1 normal and S2 normal. No murmur heard. Pulmonary:      Effort: Pulmonary effort is normal. No respiratory distress. Breath sounds: Normal breath sounds and air entry. No stridor or decreased air movement. No decreased breath sounds, wheezing, rhonchi or rales. Chest:      Chest wall: No tenderness. Abdominal:      General: Abdomen is flat. Bowel sounds are normal. There is no distension. Palpations: Abdomen is soft. Tenderness: There is no abdominal tenderness. There is no guarding. Musculoskeletal:         General: No swelling, tenderness, deformity or signs of injury. Normal range of motion. Cervical back: Normal range of motion and neck supple. No rigidity or tenderness. Right lower leg: No edema. Left lower leg: No edema. Feet:      Right foot:      Skin integrity: Skin integrity normal.      Left foot:      Skin integrity: Skin integrity normal.   Skin:     General: Skin is warm and dry. Capillary Refill: Capillary refill takes less than 2 seconds. Coloration: Skin is not jaundiced or pale. Findings: No bruising, erythema, lesion or rash. Nails: There is no clubbing. Neurological:      General: No focal deficit present. Mental Status: She is alert and oriented to person, place, and time. Mental status is at baseline. Sensory: Sensation is intact. Motor: Motor function is intact. No weakness. Deep Tendon Reflexes:      Reflex Scores:       Tricep reflexes are 2+ on the right side and 2+ on the left side. Bicep reflexes are 2+ on the right side and 2+ on the left side.        Brachioradialis reflexes are 2+ on the right side and 2+ on the left side. Patellar reflexes are 0 on the right side and 0 on the left side. Achilles reflexes are 0 on the right side and 0 on the left side. Psychiatric:         Attention and Perception: Attention and perception normal.         Mood and Affect: Mood and affect normal.         Speech: Speech normal.         Behavior: Behavior normal. Behavior is cooperative. Thought Content: Thought content normal.         Cognition and Memory: Cognition and memory normal.         Judgment: Judgment normal.     Neurologic Exam     Mental Status   Oriented to person, place, and time. Oriented to person. Oriented to place. Oriented to time. Attention: normal. Concentration: normal.   Speech: speech is normal   Level of consciousness: alert  Knowledge: good. Cranial Nerves     CN II   Visual acuity: normal with correction  Right visual field deficit: none  Left visual field deficit: none     CN III, IV, VI   Pupils are equal, round, and reactive to light. Right pupil: Size: 2 mm. Shape: regular. Reactivity: brisk. Left pupil: Size: 2 mm. Shape: regular. Reactivity: brisk. CN III: no CN III palsy  CN VI: no CN VI palsy  Nystagmus: none   Diplopia: none  Upgaze: normal  Downgaze: normal    CN V   Facial sensation intact.    Right facial sensation deficit: none  Left facial sensation deficit: none    CN VII   Right facial weakness: none  Left facial weakness: none    CN VIII   Hearing: intact    CN XI   Right sternocleidomastoid strength: normal  Left sternocleidomastoid strength: normal  Right trapezius strength: normal  Left trapezius strength: normal    CN XII   Tongue: not atrophic  Tongue deviation: none    Motor Exam   Muscle bulk: normal  Overall muscle tone: normal  Right arm tone: normal  Left arm tone: decreased  Right arm pronator drift: absent  Left arm pronator drift: absent    Sensory Exam   Right arm light touch: normal  Left arm light touch: normal  Right leg light touch: normal  Left leg light touch: normal  Right arm vibration: decreased from fingers  Left arm vibration: decreased from fingers  Right leg vibration: decreased from ankle  Left leg vibration: decreased from ankle  Right arm pinprick: decreased from elbow  Left arm pinprick: normal  Right leg pinprick: normal  Left leg pinprick: normal    Gait, Coordination, and Reflexes     Tremor   Resting tremor: absent  Intention tremor: absent  Action tremor: absent    Reflexes   Right brachioradialis: 2+  Left brachioradialis: 2+  Right biceps: 2+  Left biceps: 2+  Right triceps: 2+  Left triceps: 2+  Right patellar: 0  Left patellar: 0  Right achilles: 0  Left achilles: 0       NIH Stroke Scale Calculator    1.  a. Level of consciousness: Alert (0 points)      b. Patient asked current month and age: Answers both correctly (0 points)      c. Patient asked to open And close eyes: Obeys both correctly (0 points)  2. Best gaze: Normal (0 points)  3. Visual field testing: No visual field loss (0 points)  4. Facial paresis: Normal symmetrical movement (0 points)  5.  a. Motor function - left arm: Normal (0 points)      b. Motor function - right arm: Normal (0 points)  6.  a. Motor function - left leg: No effort against gravity (3 points)      b. Motor function - right leg: No effort against gravity (3 points)  7. Limb ataxia: No ataxia (0 points)  8. Sensory: Mild to moderate decrease in sensation (1 point)  9. Best language: No aphasia (0 points)  10. Dysarthria: Normal articulation (0 points)  11. Exinction and inattention: Normal (0 points)    Total: 7 points. DATA:     Diagnostic tests reviewed for today's visit:    Most recent labs and imaging results reviewed.      LABS:    Recent Results (from the past 24 hour(s))   CBC with Auto Differential    Collection Time: 09/28/22  1:15 AM   Result Value Ref Range    WBC 10.8 4.8 - 10.8 K/uL    RBC 3.89 (L) 4.20 - 5.40 M/uL    Hemoglobin 11.9 (L) 12.0 - 16.0 g/dL Value Ref Range    POC Glucose 267 (H) 70 - 99 mg/dl    Performed on ACCU-CHEK    POCT Creatinine    Collection Time: 09/28/22  1:19 AM   Result Value Ref Range    POC CREATININE WHOLE BLOOD 1.1    EKG 12 Lead    Collection Time: 09/28/22  1:32 AM   Result Value Ref Range    Ventricular Rate 54 BPM    Atrial Rate 54 BPM    P-R Interval 154 ms    QRS Duration 98 ms    Q-T Interval 424 ms    QTc Calculation (Bazett) 402 ms    P Axis 39 degrees    R Axis 43 degrees    T Axis 28 degrees   Urine Drug Screen    Collection Time: 09/28/22  3:19 AM   Result Value Ref Range    Drug Screen Comment: see below    Urine Drug Screen, Comprehensive    Collection Time: 09/28/22  3:19 AM   Result Value Ref Range    PCP Screen, Urine Neg Negative <25 ng/mL    Benzodiazepine Screen, Urine Neg Negative <150 ng/mL    Cocaine Metabolite Screen, Urine Neg Negative <150 ng/mL    Amphetamine Screen, Urine Neg Negative <500 ng/mL    Cannabinoid Scrn, Ur Neg Negative <50 ng/mL    Opiate Scrn, Ur Neg Negative <100 ng/mL    Barbiturate Screen, Ur Neg Negative <200 ng/mL    Methadone Screen, Urine Neg Negative <200 ng/mL    Propoxyphene Screen, Urine Neg Negative <300 ng/mL    UR Oxycodone Rapid Screen Neg Negative <100 ng/mL       IMAGING:   XR CHEST PORTABLE    Result Date: 9/28/2022  No acute disease. RECOMMENDATION: Careful clinical correlation and follow up recommended. VTE Prophylaxis: low molecular weight heparin -  start     ASSESSMENT AND PLAN    Principal Problem:      Transient paralysis -patient woke up today with paralysis of had arms and neck, arms are now movable, legs are slowly starting to be able to move at this time. She also has decreased reflexes from the ankles down and decreased pinprick sensation from ankles down as well.   Plan: ASA, increased home dose of statin, anticoagulation, CBC with Diff, CMP, HGB A1C, lipid panel, consult neurology, imaging per neurology, NIHSS Q4 x 6 then every shift,  PT/OT/SLP, telemetry, stroke education, swallow screen before diet or PO meds. Active Problems:      HTN (hypertension) -blood pressure 178/90, takes cozaar at home. Plan: Will resume home meds, as tolerated, when home med list is completed by nursing. HLD (hyperlipidemia) -on statin,  Plan: Increase dose of statin      DMII (diabetes mellitus, type 2) ([facility] [address] -most recent hemoglobin A1c was 8.0 on 12/24/2016. Patient takes metformin. Plan: Hold p.o. antidiabetic medication, Medium dose sliding scale coverage, Accu-Cheks AC at bedtime, check A1c, diabetic diet, hypoglycemia rescue meds as needed.     Plan of care discussed with: patient    SIGNATURE: PAUL Brody - CNP  DATE: September 28, 2022  TIME: 6:01 AM     Adan Ferraro DO - supervising physician

## 2022-09-28 NOTE — PROGRESS NOTES
Hospitalist Progress Note      PCP: Wallace Pinto DO    Date of Admission: 9/28/2022    Chief Complaint:      Subjective: :    Seen and examined. Orders and notes reviewed. Patient states she woke from sleep and was paralyzed could not move her upper or lower extremities, she states she was able to speak. Gradually she regained motor of her upper extremities and was able to dial her phone that was resting on her chest to call 911. In the emergency room she has had gradual improvement in the weakness. No associated paresthesia. She admits to a headache after bleach exposure. Exam is interesting, no upper extremity weakness, hyperreflexia. On lower extremity she is able to lift the legs to rotate to the edge of the bed and sit at the bedside but has weakness in hip flexion, leg extension, leg flexion. No appreciated weakness on dorsiflexion or plantar flexion. Normal patellar reflexes.         Medications:  Reviewed    Infusion Medications    dextrose       Scheduled Medications    enoxaparin  40 mg SubCUTAneous Daily    aspirin  81 mg Oral Daily    Or    aspirin  300 mg Rectal Daily    atorvastatin  80 mg Oral Nightly    insulin lispro  0-8 Units SubCUTAneous TID WC    insulin lispro  0-4 Units SubCUTAneous Nightly     PRN Meds: ondansetron **OR** ondansetron, polyethylene glycol, labetalol, glucose, dextrose bolus **OR** dextrose bolus, glucagon (rDNA), dextrose    No intake or output data in the 24 hours ending 09/28/22 1356    Exam:    BP (!) 105/51   Pulse 60   Temp 98.6 °F (37 °C)   Resp 16   Ht 5' 2\" (1.575 m)   Wt 188 lb (85.3 kg)   LMP 03/31/2019   SpO2 97%   BMI 34.39 kg/m²       Gen: No acute distress, alert and oriented x3, appears comfortable  HEENT: Normocephalic, atraumatic, pupils equal and reactive, neck is supple and trachea is midline  RESP: Clear to auscultation bilaterally on room air  Cardio: Regular rate and rhythm  Abdomen: Soft, nondistended nontender to palpation  Ext: LE no edema  Skin: Warm and Dry  Neuro: Alert and oriented x3, on exam she has 5 out of 5 muscle strength bilateral upper extremities and symmetric weakness in hip flexors bilaterally, leg extension and flexion bilaterally. No hyperreflexia in the lower extremities      Labs:   Recent Labs     09/28/22 0115   WBC 10.8   HGB 11.9*   HCT 35.6*        Recent Labs     09/28/22 0115 09/28/22 0119   *  --    K 4.4  --    CL 96  --    CO2 26  --    BUN 14  --    CREATININE 1.07* 1.1   CALCIUM 8.7  --      Recent Labs     09/28/22 0115   AST 16   ALT 21   BILITOT <0.2   ALKPHOS 87     Recent Labs     09/28/22 0115   INR 1.1     Recent Labs     09/28/22 0115   CKTOTAL 58       Urinalysis:      Lab Results   Component Value Date/Time    NITRU Negative 12/24/2016 08:14 PM    WBCUA 3-5 12/24/2016 08:14 PM    BACTERIA Few 12/24/2016 08:14 PM    RBCUA 0-2 12/24/2016 08:14 PM    BLOODU MODERATE 12/24/2016 08:14 PM    SPECGRAV 1.008 12/24/2016 08:14 PM    GLUCOSEU 250 12/24/2016 08:14 PM       Radiology:  XR CHEST PORTABLE   Final Result   No acute disease. RECOMMENDATION:   Careful clinical correlation and follow up recommended. CTA HEAD W WO CONTRAST   Final Result   Extensive cervical lymphadenopathy. Less than 50% luminal narrowing at the origin of the right internal carotid   artery. Otherwise unremarkable CTA of the neck. Unremarkable CTA of the head. CTA NECK W WO CONTRAST   Final Result   Extensive cervical lymphadenopathy. Less than 50% luminal narrowing at the origin of the right internal carotid   artery. Otherwise unremarkable CTA of the neck. Unremarkable CTA of the head.                  Assessment/Plan:    Active Hospital Problems    Diagnosis Date Noted    Transient paralysis [R29.5] 09/28/2022     Priority: Medium    HTN (hypertension) [I10] 09/28/2022     Priority: Medium    HLD (hyperlipidemia) [E78.5] 09/28/2022     Priority: Medium    DMII (diabetes mellitus, type 2) ([facility] [E11.9] 12/24/2016         Transient paralysis -patient woke up today with paralysis of had arms and neck, arms are now movable, legs are slowly starting to be able to move at this time. She reported absent reflexes in the lower extremities, on my exam she has appropriate patellar reflex and no reported paresthesia or sensory deficit in the lower extremities. Plan: ASA, increased home dose of statin, anticoagulation, CBC with Diff, CMP, HGB A1C, lipid panel, consult neurology, imaging per neurology, NIHSS Q4 x 6 then every shift,  PT/OT/SLP, telemetry, stroke education, swallow screen before diet or PO meds. 9/28:  -Question whether this is sleep paralysis? Symptom duration would not last this long and she is reporting continued weakness in hip flexors bilaterally. Exam is somewhat conflicting. CT the brain is nonacute. CTA without greater than 50% stenosis or large vessel occlusion. CT the neck notable for cervical lymphadenopathy  Will appreciate neurology input. Active Problems:       HTN (hypertension) -blood pressure 178/90, takes cozaar at home. Plan: Home losartan with hold parameters       HLD (hyperlipidemia) -on statin,  Plan: Increase dose of statin       DMII (diabetes mellitus, type 2) ([facility] -most recent hemoglobin A1c was 8.0 on 12/24/2016. Patient takes metformin. Plan: Hold metformin. Continue glipizide. Medium dose sliding scale coverage, Accu-Cheks AC at bedtime, check A1c, diabetic diet, hypoglycemia rescue meds as needed. Additional work up or/and treatment plan may be added today or then after based on clinical progression. I am managing a portion of pt care. Some medical issues are handled by other specialists. Additional work up and treatment should be done in out pt setting by pt PCP and other out pt providers.          Diet: Diet NPO    PT/OT Eval     Code Status: Full Code    Myra Arriola DO                Electronically signed by Kevin Montes DO on 9/28/2022 at 1:56 PM

## 2022-09-28 NOTE — ED NOTES
Pt awake alert skin pink warm dry. Pt wiggling feet and moving legs on own. Pt able to lift legs off bed. pt aware of bed assignment change. Justin Rothman RN  09/28/22 [id number]

## 2022-09-29 ENCOUNTER — APPOINTMENT (OUTPATIENT)
Dept: CT IMAGING | Age: 47
DRG: 091 | End: 2022-09-29
Payer: MEDICARE

## 2022-09-29 VITALS
WEIGHT: 188 LBS | HEART RATE: 75 BPM | RESPIRATION RATE: 20 BRPM | TEMPERATURE: 97.9 F | SYSTOLIC BLOOD PRESSURE: 143 MMHG | OXYGEN SATURATION: 98 % | BODY MASS INDEX: 34.6 KG/M2 | HEIGHT: 62 IN | DIASTOLIC BLOOD PRESSURE: 74 MMHG

## 2022-09-29 PROBLEM — G82.50 QUADRIPARESIS (HCC): Status: ACTIVE | Noted: 2022-09-29

## 2022-09-29 PROBLEM — M48.02 CERVICAL SPINAL STENOSIS: Status: ACTIVE | Noted: 2022-09-29

## 2022-09-29 LAB
ALBUMIN SERPL-MCNC: 4.3 G/DL (ref 3.5–4.6)
ALP BLD-CCNC: 79 U/L (ref 40–130)
ALT SERPL-CCNC: 23 U/L (ref 0–33)
ANION GAP SERPL CALCULATED.3IONS-SCNC: 16 MEQ/L (ref 9–15)
AST SERPL-CCNC: 19 U/L (ref 0–35)
BASOPHILS ABSOLUTE: 0.1 K/UL (ref 0–0.2)
BASOPHILS RELATIVE PERCENT: 1.2 %
BILIRUB SERPL-MCNC: 0.3 MG/DL (ref 0.2–0.7)
BUN BLDV-MCNC: 13 MG/DL (ref 6–20)
CALCIUM SERPL-MCNC: 9.2 MG/DL (ref 8.5–9.9)
CHLORIDE BLD-SCNC: 103 MEQ/L (ref 95–107)
CHOLESTEROL, TOTAL: 160 MG/DL (ref 0–199)
CO2: 22 MEQ/L (ref 20–31)
CREAT SERPL-MCNC: 0.78 MG/DL (ref 0.5–0.9)
EOSINOPHILS ABSOLUTE: 1.1 K/UL (ref 0–0.7)
EOSINOPHILS RELATIVE PERCENT: 10.6 %
GFR AFRICAN AMERICAN: >60
GFR NON-AFRICAN AMERICAN: >60
GLOBULIN: 3.4 G/DL (ref 2.3–3.5)
GLUCOSE BLD-MCNC: 133 MG/DL (ref 70–99)
GLUCOSE BLD-MCNC: 166 MG/DL (ref 70–99)
HBA1C MFR BLD: 7 % (ref 4.8–5.9)
HCT VFR BLD CALC: 39.9 % (ref 37–47)
HDLC SERPL-MCNC: 44 MG/DL (ref 40–59)
HEMOGLOBIN: 13.4 G/DL (ref 12–16)
LDL CHOLESTEROL CALCULATED: 83 MG/DL (ref 0–129)
LYMPHOCYTES ABSOLUTE: 2.7 K/UL (ref 1–4.8)
LYMPHOCYTES RELATIVE PERCENT: 25.3 %
MCH RBC QN AUTO: 31.1 PG (ref 27–31.3)
MCHC RBC AUTO-ENTMCNC: 33.5 % (ref 33–37)
MCV RBC AUTO: 92.8 FL (ref 82–100)
MONOCYTES ABSOLUTE: 0.8 K/UL (ref 0.2–0.8)
MONOCYTES RELATIVE PERCENT: 7.6 %
NEUTROPHILS ABSOLUTE: 6 K/UL (ref 1.4–6.5)
NEUTROPHILS RELATIVE PERCENT: 55.3 %
PDW BLD-RTO: 12.5 % (ref 11.5–14.5)
PERFORMED ON: ABNORMAL
PLATELET # BLD: 332 K/UL (ref 130–400)
POTASSIUM REFLEX MAGNESIUM: 3.8 MEQ/L (ref 3.4–4.9)
RBC # BLD: 4.3 M/UL (ref 4.2–5.4)
SODIUM BLD-SCNC: 141 MEQ/L (ref 135–144)
TOTAL PROTEIN: 7.7 G/DL (ref 6.3–8)
TRIGL SERPL-MCNC: 165 MG/DL (ref 0–150)
WBC # BLD: 10.8 K/UL (ref 4.8–10.8)

## 2022-09-29 PROCEDURE — 97116 GAIT TRAINING THERAPY: CPT

## 2022-09-29 PROCEDURE — 99221 1ST HOSP IP/OBS SF/LOW 40: CPT | Performed by: PSYCHIATRY & NEUROLOGY

## 2022-09-29 PROCEDURE — 85025 COMPLETE CBC W/AUTO DIFF WBC: CPT

## 2022-09-29 PROCEDURE — 80053 COMPREHEN METABOLIC PANEL: CPT

## 2022-09-29 PROCEDURE — 36415 COLL VENOUS BLD VENIPUNCTURE: CPT

## 2022-09-29 PROCEDURE — 72125 CT NECK SPINE W/O DYE: CPT

## 2022-09-29 PROCEDURE — 6370000000 HC RX 637 (ALT 250 FOR IP): Performed by: INTERNAL MEDICINE

## 2022-09-29 PROCEDURE — G0378 HOSPITAL OBSERVATION PER HR: HCPCS

## 2022-09-29 PROCEDURE — 80061 LIPID PANEL: CPT

## 2022-09-29 PROCEDURE — 83036 HEMOGLOBIN GLYCOSYLATED A1C: CPT

## 2022-09-29 RX ORDER — ASPIRIN 81 MG/1
81 TABLET ORAL DAILY
Qty: 30 TABLET | Refills: 3 | Status: SHIPPED | OUTPATIENT
Start: 2022-09-29

## 2022-09-29 RX ORDER — LOSARTAN POTASSIUM 50 MG/1
50 TABLET ORAL DAILY
Qty: 90 TABLET | Refills: 1 | Status: SHIPPED | OUTPATIENT
Start: 2022-09-29

## 2022-09-29 RX ORDER — OXCARBAZEPINE 150 MG/1
300 TABLET, FILM COATED ORAL 2 TIMES DAILY
Qty: 60 TABLET | Refills: 3 | Status: SHIPPED | OUTPATIENT
Start: 2022-09-29

## 2022-09-29 RX ORDER — TIZANIDINE 4 MG/1
4 TABLET ORAL 4 TIMES DAILY
Status: DISCONTINUED | OUTPATIENT
Start: 2022-09-29 | End: 2022-09-29 | Stop reason: HOSPADM

## 2022-09-29 RX ADMIN — TIZANIDINE 4 MG: 4 TABLET ORAL at 10:24

## 2022-09-29 ASSESSMENT — ENCOUNTER SYMPTOMS
PHOTOPHOBIA: 0
COLOR CHANGE: 0
BACK PAIN: 0
TROUBLE SWALLOWING: 0
SHORTNESS OF BREATH: 0
NAUSEA: 0
VOMITING: 0
CHOKING: 0

## 2022-09-29 ASSESSMENT — PAIN DESCRIPTION - DESCRIPTORS: DESCRIPTORS: ACHING

## 2022-09-29 ASSESSMENT — PAIN SCALES - GENERAL: PAINLEVEL_OUTOF10: 9

## 2022-09-29 ASSESSMENT — PAIN DESCRIPTION - LOCATION: LOCATION: NECK

## 2022-09-29 NOTE — DISCHARGE INSTR - COC
Continuity of Care Form    Patient Name: Eliza Coker   :  1975  MRN:  [id number]    Admit date:  2022  Discharge date:  ***    Code Status Order: Full Code   Advance Directives:     Admitting Physician:  Asuncion Young DO  PCP: Abi Walters DO    Discharging Nurse: [facility] Unit/Room#: J153/K848-04  Discharging Unit Phone Number: ***    Emergency Contact:   Extended Emergency Contact Information  Primary Emergency Contact: Linda Perry  Address: [address] [address] Rd           Chelsea Patches, [address] [place] [address] Phone: [phone]  Relation: Child    Past Surgical History:  Past Surgical History:   Procedure Laterality Date    ANKLE SURGERY Right     ANUS SURGERY      anal sphincter reconstruction    CYST REMOVAL Left     KNEE SURGERY Right     NERVE SURGERY N/A 2020    REPLACEMENT OF SPINAL CORD STIMULATOR GENERATOR performed by Yobany García MD at . [facility]      spinal stimulator    OVARIAN CYST REMOVAL Right     TONSILLECTOMY      TUBAL LIGATION      and then reversal of tubal ligation       Immunization History:   Immunization History   Administered Date(s) Administered    Tdap (Boostrix, Adacel) 2019       Active Problems:  Patient Active Problem List   Diagnosis Code    Right arm pain M79.601    Numbness R20.0    Neck pain M54.2    Weakness of right arm R29.898    Smoker F17.200    CTS (carpal tunnel syndrome) G56.00    DMII (diabetes mellitus, type 2) ([address] E11.9    Hypotension I95.9    Transient paralysis R29.5    HTN (hypertension) I10    HLD (hyperlipidemia) E78.5       Isolation/Infection:   Isolation            No Isolation          Patient Infection Status       Infection Onset Added Last Indicated Last Indicated By Review Planned Expiration Resolved Resolved By    None active    Resolved    COVID-19 (Rule Out) 20 COVID-19 (Ordered)   20 Rule-Out Test Resulted            Nurse Assessment:  Last Vital Signs: BP (!) 143/74   Pulse 69   Temp 97.9 °F (36.6 °C)   Resp 18   Ht 5' 2\" (1.575 m)   Wt 188 lb (85.3 kg)   LMP 2019   SpO2 96%   BMI 34.39 kg/m²     Last documented pain score (0-10 scale): Pain Level: 5  Last Weight:   Wt Readings from Last 1 Encounters:   22 188 lb (85.3 kg)     Mental Status:  {IP PT MENTAL STATUS:}    IV Access:  { GABO IV ACCESS:104016508}    Nursing Mobility/ADLs:  Walking   {CHP DME EGBW:693221107}  Transfer  {CHP DME OXQM:804579859}  Bathing  {CHP DME ONF}  Dressing  {CHP DME WJVW:388542578}  Toileting  {CHP DME XBVA:326599246}  Feeding  {CHP DME QZPI:854561793}  Med Admin  {CHP DME CDCK:228514115}  Med Delivery   { GABO MED Delivery:895600222}    Wound Care Documentation and Therapy:  Incision 20 Abdomen Left; Lower (Active)   Number of days: 644        Elimination:  Continence: Bowel: {YES / BY:08462}  Bladder: {YES / XI:37887}  Urinary Catheter: {Urinary Catheter:019068114}   Colostomy/Ileostomy/Ileal Conduit: {YES / NP:59340}       Date of Last BM: ***    Intake/Output Summary (Last 24 hours) at 2022 0900  Last data filed at 2022 2320  Gross per 24 hour   Intake 480 ml   Output 900 ml   Net -420 ml     I/O last 3 completed shifts:   In: 18 [P.O.:480]  Out: 900 [Urine:900]    Safety Concerns:     [address] [employer] Safety Concerns:993889803}    Impairments/Disabilities:      [address] [employer] Impairments/Disabilities:584002580}    Nutrition Therapy:  Current Nutrition Therapy:   [employer] Diet List:581655702}    Routes of Feeding: {CHP DME Other Feedings:130778910}  Liquids: {Slp liquid thickness:53044}  Daily Fluid Restriction: {CHP DME Yes amt example:932050213}  Last Modified Barium Swallow with Video (Video Swallowing Test): {Done Not Done NDXW:516825040}    Treatments at the Time of Hospital Discharge:   Respiratory Treatments: ***  Oxygen Therapy:  {Therapy; copd oxygen:37340}  Ventilator:    {MH CC Vent UNGN:595977931}    Rehab Therapies: {THERAPEUTIC INTERVENTION:0026587728}  Weight Bearing Status/Restrictions: [address] CC Weight Bearin}  Other Medical Equipment (for information only, NOT a DME order):  {EQUIPMENT:332964805}  Other Treatments: ***    Patient's personal belongings (please select all that are sent with patient):  {CHP DME Belongings:553787638}    RN SIGNATURE:  {Esignature:769159846}    CASE MANAGEMENT/SOCIAL WORK SECTION    Inpatient Status Date: ***    Readmission Risk Assessment Score:  Readmission Risk              Risk of Unplanned Readmission:  9           Discharging to Facility/ Agency   Name:   Address:  Phone:  Fax:    Dialysis Facility (if applicable)   Name:  Address:  Dialysis Schedule:  Phone:  Fax:    / signature: {Esignature:564386808}    PHYSICIAN SECTION    Prognosis: Good    Condition at Discharge: Stable    Rehab Potential (if transferring to Rehab): Good    Recommended Labs or Other Treatments After Discharge:     Physician Certification: I certify the above information and transfer of Deshaun Chase  is necessary for the continuing treatment of the diagnosis listed and that she requires Home Care for greater 30 days.      Update Admission H&P: No change in H&P    PHYSICIAN SIGNATURE:  Electronically signed by Janet Doyle DO on 22 at 9:00 AM EDT

## 2022-09-29 NOTE — PROGRESS NOTES
Physical Therapy Med Surg Daily Treatment Note  Fac[facility]Department: [facility]  Room: X8M356-41       NAME: Roel Becker  : 1975 (52 y.o.)  MRN: [id number]  CODE STATUS: Full Code    Date of Service: 2022    Patient Diagnosis(es): Transient paralysis [R29.5]  Weakness of both lower extremities [R29.898]   Chief Complaint   Patient presents with    Extremity Weakness     Bilateral leg \"paralysis\"     Patient Active Problem List    Diagnosis Date Noted    Quadriparesis ([facility] 2022    Cervical spinal stenosis 2022    Transient paralysis 2022    HTN (hypertension) 2022    HLD (hyperlipidemia) 2022    DMII (diabetes mellitus, type 2) ([facility] 2016    Hypotension 2016    Right arm pain 2014    Numbness 2014    Neck pain 2014    Weakness of right arm 2014    Smoker 2014    CTS (carpal tunnel syndrome) 2014        Past Medical History:   Diagnosis Date    Anxiety     Chronic back pain     Chronic neck pain     Depression     Diabetes mellitus ([facility]     Hyperlipidemia     Hypertension     Osteoarthritis     Panic attack      Past Surgical History:   Procedure Laterality Date    ANKLE SURGERY Right     ANUS SURGERY      anal sphincter reconstruction    CYST REMOVAL Left     KNEE SURGERY Right     NERVE SURGERY N/A 2020    REPLACEMENT OF SPINAL CORD STIMULATOR GENERATOR performed by Meka Humphreys MD at [facility]      spinal stimulator    OVARIAN CYST REMOVAL Right     TONSILLECTOMY      TUBAL LIGATION      and then reversal of tubal ligation       Chart Reviewed: Yes    Restrictions:  Restrictions/Precautions: Fall Risk    SUBJECTIVE:   Subjective: I am going home!     Pain  Pain: denies pain    OBJECTIVE:   Orientation  Overall Orientation Status: Within Functional Limits  Cognition  Overall Cognitive Status: WFL    Bed mobility  Supine to Sit: Independent  Sit to Supine: Independent  Scooting: Independent  Bed Mobility Comments: Pt with significantly improved ability to perform bed mobility without need for any cues or assistance. Pt reports feeling and movement has returned throughout her body. Transfers  Sit to Stand: Independent  Stand to sit: Independent  Comment: Pt able to perform STS transfers without use of AD or assistance needed. Pt able to stand without use of UE's and stabilize without the need for increased time or cueing. Pt with good ability to maintain standing balance. Ambulation  Surface: level tile  Device: No Device  Assistance: Independent  Quality of Gait: steady pace, minimal deviations noted  Distance: 200 ft  Comments: Pt with improved ability to tolerate long distance gait with no significant deviations or instability noted throughout. Activity Tolerance  Activity Tolerance: Patient tolerated treatment well          ASSESSMENT   Assessment: Pt able to return to independent level this session. Pt reports feeling completely back to normal self and is ready to go home to return to her normal life. Pt with no safety concerns or instability noted throughout all mobility.      Discharge Recommendations:  Continue to assess pending progress         Goals  Long Term Goals  Long term goal 1: Bed mobility with indep  Long term goal 2: Functional transfers with indep  Long term goal 3: Progress pregait and gait when able with AD PRN distances in room ~30 ft  Long term goal 4: Pt will progress to stairs when appropriate with 1 person assist  Patient Goals   Patient goals : pt does not state    PLAN    Plan: 1 time a day 3-6 times a week  Safety Devices  Type of Devices: Left in bed, Call light within reach, All fall risk precautions in place     [facility] (6 CLICK) BASIC MOBILITY  AM-PAC Inpatient Mobility Raw Score : 24     Therapy Time   Individual   Time In 1129   Time Out 1143   Minutes 14      Gait- [address] [place], [place], 09/29/22 at 12:11 PM Definitions for assistance levels  Independent = pt does not require any physical supervision or assistance from another person for activity completion. Device may be needed.   Stand by assistance = pt requires verbal cues or instructions from another person, close to but not touching, to perform the activity  Minimal assistance= pt performs 75% or more of the activity; assistance is required to complete the activity  Moderate assistance= pt performs 50% of the activity; assistance is required to complete the activity  Maximal assistance = pt performs 25% of the activity; assistance is required to complete the activity  Dependent = pt requires total physical assistance to accomplish the task

## 2022-09-29 NOTE — CONSULTS
Subjective:      Patient ID: Lanette Mcintyre is a 52 y.o. female who presents today for management weakness. Israel Syed HPI 52 right-handed female admitted transient weakness lasting for about 3 hours. Patient reports she had weakness in the upper and lower extremities she was not able to move. She reports that she had a knee injection with lidocaine And then afterwards. Prior to that she had lidocaine for her hysterectomy and the same thing it happened. She is asymptomatic at this time. She has significant cervical spinal stenosis and has a spinal stimulator. Patient is not COVID for any numbness tingling at this time. She is ambulatory today    Review of Systems   Constitutional:  Negative for fever. HENT:  Negative for ear pain, tinnitus and trouble swallowing. Eyes:  Negative for photophobia and visual disturbance. Respiratory:  Negative for choking and shortness of breath. Cardiovascular:  Negative for chest pain and palpitations. Gastrointestinal:  Negative for nausea and vomiting. Musculoskeletal:  Negative for back pain, gait problem, joint swelling, myalgias, neck pain and neck stiffness. Skin:  Negative for color change. Allergic/Immunologic: Negative for food allergies. Neurological:  Negative for dizziness, tremors, seizures, syncope, facial asymmetry, speech difficulty, weakness, light-headedness, numbness and headaches. Psychiatric/Behavioral:  Negative for behavioral problems, confusion, hallucinations and sleep disturbance.       Past Medical History:   Diagnosis Date    Anxiety     Chronic back pain     Chronic neck pain     Depression     Diabetes mellitus ([address]     Hyperlipidemia     Hypertension     Osteoarthritis     Panic attack      Past Surgical History:   Procedure Laterality Date    ANKLE SURGERY Right     ANUS SURGERY      anal sphincter reconstruction    CYST REMOVAL Left     KNEE SURGERY Right     NERVE SURGERY N/A 12/24/2020    REPLACEMENT OF SPINAL CORD STIMULATOR GENERATOR performed by Yobany García MD at [address] [address]      spinal stimulator    OVARIAN CYST REMOVAL Right     TONSILLECTOMY      TUBAL LIGATION      and then reversal of tubal ligation     Social History     Socioeconomic History    Marital status: Single     Spouse name: Not on file    Number of children: Not on file    Years of education: Not on file    Highest education level: Not on file   Occupational History    Not on file   Tobacco Use    Smoking status: Every Day     Packs/day: 0.50     Years: 20.00     Pack years: 10.00     Types: Cigarettes    Smokeless tobacco: Never   Vaping Use    Vaping Use: Never used   Substance and Sexual Activity    Alcohol use: Yes     Comment: rarely    Drug use: No    Sexual activity: Not Currently   Other Topics Concern    Not on file   Social History Narrative    Not on file     Social Determinants of Health     Financial Resource Strain: Not on file   Food Insecurity: Not on file   Transportation Needs: Not on file   Physical Activity: Not on file   Stress: Not on file   Social Connections: Not on file   Intimate Partner Violence: Not on file   Housing Stability: Not on file     History reviewed. No pertinent family history.   Allergies   Allergen Reactions    Decadron [Dexamethasone]     Guaifenesin & Derivatives     Influenza Vaccines     Morphine Hives    Topamax [Topiramate]      Current Facility-Administered Medications   Medication Dose Route Frequency Provider Last Rate Last Admin    tiZANidine (ZANAFLEX) tablet 4 mg  4 mg Oral 4x daily Kevin May, DO   4 mg at 09/29/22 1024    ondansetron (ZOFRAN-ODT) disintegrating tablet 4 mg  4 mg Oral Q8H PRN PAUL Harper CNP        Or    ondansetron (ZOFRAN) injection 4 mg  4 mg IntraVENous Q6H PRN PAUL Harper CNP        polyethylene glycol (GLYCOLAX) packet 17 g  17 g Oral Daily PRN PAUL Harper CNP        enoxaparin (LOVENOX) injection 40 mg  40 mg SubCUTAneous Daily Kim PAUL Rodriguez CNP   40 mg at 09/28/22 1432    aspirin EC tablet 81 mg  81 mg Oral Daily PAUL Harper CNP        Or    aspirin suppository 300 mg  300 mg Rectal Daily PAUL Harper CNP        labetalol (NORMODYNE;TRANDATE) injection 10 mg  10 mg IntraVENous Q10 Min PRN PAUL Harper CNP        atorvastatin (LIPITOR) tablet 80 mg  80 mg Oral Nightly PAUL Harper CNP        insulin lispro (HUMALOG) injection vial 0-8 Units  0-8 Units SubCUTAneous TID WC PAUL Harper CNP        insulin lispro (HUMALOG) injection vial 0-4 Units  0-4 Units SubCUTAneous Nightly PAUL Harper CNP        glucose chewable tablet 16 g  4 tablet Oral PRN PAUL Harper CNP        dextrose bolus 10% 125 mL  125 mL IntraVENous PRN PAUL Harper CNP        Or    dextrose bolus 10% 250 mL  250 mL IntraVENous PRN PAUL Harper CNP        glucagon (rDNA) injection 1 mg  1 mg SubCUTAneous PRN Angie ToniPAUL barajas CNP        dextrose 10 % infusion   IntraVENous Continuous PRN PAUL Hensley CNP        amitriptyline (ELAVIL) tablet 25 mg  25 mg Oral Nightly Edward Preciado,         busPIRone (BUSPAR) tablet 15 mg  15 mg Oral Nightly Yesi Littlejohn, DO        glimepiride (AMARYL) tablet 2 mg (Patient Supplied)  2 mg Oral QAM AC Yesi Littlejohn,         losartan (COZAAR) tablet 25 mg  25 mg Oral Daily Edward Preciado,         sertraline (ZOLOFT) tablet 100 mg  100 mg Oral Daily Yesi Littlejohn,         OXcarbazepine (TRILEPTAL) tablet 150 mg  150 mg Oral BID Edward Preciado, DO        pantoprazole (PROTONIX) tablet 40 mg  40 mg Oral QAM AC Yesi Littlejohn,         pioglitazone (ACTOS) tablet 30 mg  30 mg Oral Daily Yesi Littlejohn,             Objective:   BP (!) 143/74   Pulse 69   Temp 97.9 °F (36.6 °C)   Resp 18   Ht 5' 2\" (1.575 m)   Wt 188 lb (85.3 kg)   LMP 03/31/2019   SpO2 96%   BMI 34.39 kg/m²     Physical Exam  Vitals reviewed.    Eyes:      Pupils: Pupils are equal, round, and reactive to light. Cardiovascular:      Rate and Rhythm: Normal rate and regular rhythm. Heart sounds: No murmur heard. Pulmonary:      Effort: Pulmonary effort is normal.      Breath sounds: Normal breath sounds. Abdominal:      General: Bowel sounds are normal.   Musculoskeletal:         General: Normal range of motion. Cervical back: Normal range of motion. Skin:     General: Skin is warm. Neurological:      Mental Status: She is alert and oriented to person, place, and time. Cranial Nerves: No cranial nerve deficit. Sensory: No sensory deficit. Motor: No abnormal muscle tone. Coordination: Coordination normal.      Deep Tendon Reflexes: Reflexes are normal and symmetric. Babinski sign absent on the right side. Babinski sign absent on the left side. Psychiatric:         Mood and Affect: Mood normal.   Patient is mildly hyperreflexic throughout. Annie signs are negative    CTA HEAD W WO CONTRAST    Result Date: 9/28/2022  EXAMINATION: CTA OF THE NECK; CTA OF THE HEAD WITHOUT AND WITH CONTRAST 9/28/2022 2:09 am TECHNIQUE: CTA of the neck was performed with the administration of intravenous contrast. Multiplanar reformatted images are provided for review. MIP images are provided for review. Stenosis of the internal carotid arteries measured using NASCET criteria. Automated exposure control, iterative reconstruction, and/or weight based adjustment of the mA/kV was utilized to reduce the radiation dose to as low as reasonably achievable. CTA of the head/brain was performed without and with the administration of intravenous contrast. Multiplanar reformatted images are provided for review. MIP images are provided for review. Automated exposure control, iterative reconstruction, and/or weight based adjustment of the mA/kV was utilized to reduce the radiation dose to as low as reasonably achievable. COMPARISON: None.  HISTORY: ORDERING SYSTEM PROVIDED HISTORY: leg weakness TECHNOLOGIST PROVIDED HISTORY: Reason for exam:->leg weakness Decision Support Exception - unselect if not a suspected or confirmed emergency medical condition->Emergency Medical Condition ([place]) What reading provider will be dictating this exam?->CRC; ORDERING SYSTEM PROVIDED HISTORY: bilateral leg weakness TECHNOLOGIST PROVIDED HISTORY: Reason for exam:->bilateral leg weakness Decision Support Exception - unselect if not a suspected or confirmed emergency medical condition->Emergency Medical Condition (MA) What reading provider will be dictating this exam?->CRC FINDINGS: CTA NECK: AORTIC ARCH/ARCH VESSELS: 2 vessel aortic arch is visualized. The origins of the great vessels off the aortic arch are unremarkable. No dissection or arterial injury. No significant stenosis of the brachiocephalic or subclavian arteries. CAROTID ARTERIES: The right common carotid artery demonstrate no significant stenosis. Less than 50% luminal narrowing visualized in the right internal carotid artery at this origin best visualized on coronal series 602, image 294. The right external carotid artery is unremarkable. The left common carotid artery demonstrates no significant stenosis. The left internal and external carotid arteries demonstrate no evidence of significant stenosis. No dissection, arterial injury, or hemodynamically significant stenosis by NASCET criteria. VERTEBRAL ARTERIES: No significant atherosclerotic disease and calcification visualized at the origin of vertebral arteries of the subclavian vessels, dominant left vertebral artery is seen. Right vertebral artery is small in caliber but demonstrates no significant stenosis throughout its course in the V1 and V2 segments. No dissection, arterial injury, or significant stenosis. SOFT TISSUES: The lung apices demonstrate mild bronchovascular prominence but no evidence of focal infiltrate or consolidation common no evidence of pneumothorax no parenchymal lung mass.   Prominent mediastinal lymph nodes visualized. Extensive cervical lymphadenopathy is seen. The larynx and pharynx are unremarkable. No acute abnormality of the salivary and thyroid glands. BONES: No acute osseous abnormality. CTA HEAD: Dominant left vertebral artery. Small right vertebral artery terminating medially as the PICA artery with a small V4 segment The basilar artery demonstrates unremarkable contours, unremarkable left AICAs, unremarkable superior cerebral arteries, no evidence of basilar tip aneurysm is seen. The basilar artery terminates mainly as the left posterior cerebral artery, a small right P1 segment is seen, prominent right posterior communicating artery visualized partially supplying the right posterior cerebral artery. Bilateral posterior cerebral arteries are normal. Unremarkable petrous carotid is visualized bilaterally, bilateral cavernous and supraclinoid internal carotid arteries demonstrate unremarkable contours. The ICA termini are unremarkable bilaterally. Small right A1 segment otherwise unremarkable anterior and middle cerebral artery branches are seen. Extensive cervical lymphadenopathy. Less than 50% luminal narrowing at the origin of the right internal carotid artery. Otherwise unremarkable CTA of the neck. Unremarkable CTA of the head. CTA NECK W WO CONTRAST    Result Date: 9/28/2022  EXAMINATION: CTA OF THE NECK; CTA OF THE HEAD WITHOUT AND WITH CONTRAST 9/28/2022 2:09 am TECHNIQUE: CTA of the neck was performed with the administration of intravenous contrast. Multiplanar reformatted images are provided for review. MIP images are provided for review. Stenosis of the internal carotid arteries measured using NASCET criteria. Automated exposure control, iterative reconstruction, and/or weight based adjustment of the mA/kV was utilized to reduce the radiation dose to as low as reasonably achievable.  CTA of the head/brain was performed without and with the administration of intravenous contrast. Multiplanar reformatted images are provided for review. MIP images are provided for review. Automated exposure control, iterative reconstruction, and/or weight based adjustment of the mA/kV was utilized to reduce the radiation dose to as low as reasonably achievable. COMPARISON: None. HISTORY: ORDERING SYSTEM PROVIDED HISTORY: leg weakness TECHNOLOGIST PROVIDED HISTORY: Reason for exam:->leg weakness Decision Support Exception - unselect if not a suspected or confirmed emergency medical condition->Emergency Medical Condition ([place]) What reading provider will be dictating this exam?->CRC; ORDERING SYSTEM PROVIDED HISTORY: bilateral leg weakness TECHNOLOGIST PROVIDED HISTORY: Reason for exam:->bilateral leg weakness Decision Support Exception - unselect if not a suspected or confirmed emergency medical condition->Emergency Medical Condition ([place]) What reading provider will be dictating this exam?->CRC FINDINGS: CTA NECK: AORTIC ARCH/ARCH VESSELS: 2 vessel aortic arch is visualized. The origins of the great vessels off the aortic arch are unremarkable. No dissection or arterial injury. No significant stenosis of the brachiocephalic or subclavian arteries. CAROTID ARTERIES: The right common carotid artery demonstrate no significant stenosis. Less than 50% luminal narrowing visualized in the right internal carotid artery at this origin best visualized on coronal series 602, image 294. The right external carotid artery is unremarkable. The left common carotid artery demonstrates no significant stenosis. The left internal and external carotid arteries demonstrate no evidence of significant stenosis. No dissection, arterial injury, or hemodynamically significant stenosis by NASCET criteria. VERTEBRAL ARTERIES: No significant atherosclerotic disease and calcification visualized at the origin of vertebral arteries of the subclavian vessels, dominant left vertebral artery is seen.   Right vertebral artery is small in caliber but demonstrates no significant stenosis throughout its course in the V1 and V2 segments. No dissection, arterial injury, or significant stenosis. SOFT TISSUES: The lung apices demonstrate mild bronchovascular prominence but no evidence of focal infiltrate or consolidation common no evidence of pneumothorax no parenchymal lung mass. Prominent mediastinal lymph nodes visualized. Extensive cervical lymphadenopathy is seen. The larynx and pharynx are unremarkable. No acute abnormality of the salivary and thyroid glands. BONES: No acute osseous abnormality. CTA HEAD: Dominant left vertebral artery. Small right vertebral artery terminating medially as the PICA artery with a small V4 segment The basilar artery demonstrates unremarkable contours, unremarkable left AICAs, unremarkable superior cerebral arteries, no evidence of basilar tip aneurysm is seen. The basilar artery terminates mainly as the left posterior cerebral artery, a small right P1 segment is seen, prominent right posterior communicating artery visualized partially supplying the right posterior cerebral artery. Bilateral posterior cerebral arteries are normal. Unremarkable petrous carotid is visualized bilaterally, bilateral cavernous and supraclinoid internal carotid arteries demonstrate unremarkable contours. The ICA termini are unremarkable bilaterally. Small right A1 segment otherwise unremarkable anterior and middle cerebral artery branches are seen. Extensive cervical lymphadenopathy. Less than 50% luminal narrowing at the origin of the right internal carotid artery. Otherwise unremarkable CTA of the neck. Unremarkable CTA of the head. XR CHEST PORTABLE    Result Date: 9/28/2022  EXAMINATION: ONE XRAY VIEW OF THE CHEST 9/28/2022 2:13 am COMPARISON: None.  HISTORY: ORDERING SYSTEM PROVIDED HISTORY: presyncope TECHNOLOGIST PROVIDED HISTORY: Reason for exam:->presyncope Is the patient pregnant?->No FINDINGS: Normal cardiomediastinal silhouette. Lungs clear. No pneumothorax or effusion. Osseous thorax intact. Cervical spine stimulator leads noted. No acute disease. RECOMMENDATION: Careful clinical correlation and follow up recommended.        Lab Results   Component Value Date/Time    WBC 10.8 09/29/2022 05:05 AM    RBC 4.30 09/29/2022 05:05 AM    HGB 13.4 09/29/2022 05:05 AM    HCT 39.9 09/29/2022 05:05 AM    MCV 92.8 09/29/2022 05:05 AM    MCH 31.1 09/29/2022 05:05 AM    MCHC 33.5 09/29/2022 05:05 AM    RDW 12.5 09/29/2022 05:05 AM     09/29/2022 05:05 AM    MPV 7.5 09/25/2015 04:39 AM     Lab Results   Component Value Date/Time     09/29/2022 05:05 AM    K 3.8 09/29/2022 05:05 AM     09/29/2022 05:05 AM    CO2 22 09/29/2022 05:05 AM    BUN 13 09/29/2022 05:05 AM    CREATININE 0.78 09/29/2022 05:05 AM    GFRAA >60.0 09/29/2022 05:05 AM    LABGLOM >60.0 09/29/2022 05:05 AM    GLUCOSE 133 09/29/2022 05:05 AM    PROT 7.7 09/29/2022 05:05 AM    LABALBU 4.3 09/29/2022 05:05 AM    CALCIUM 9.2 09/29/2022 05:05 AM    BILITOT 0.3 09/29/2022 05:05 AM    ALKPHOS 79 09/29/2022 05:05 AM    AST 19 09/29/2022 05:05 AM    ALT 23 09/29/2022 05:05 AM     Lab Results   Component Value Date/Time    PROTIME 14.1 09/28/2022 01:15 AM    INR 1.1 09/28/2022 01:15 AM     No results found for: TSH, FDIBLVFS89, FOLATE, FERRITIN, IRON, TIBC, PTRFSAT, RETICCOUNT, TSH, FREET4  Lab Results   Component Value Date/Time    TRIG 165 09/29/2022 05:05 AM    HDL 44 09/29/2022 05:05 AM    LDLCALC 83 09/29/2022 05:05 AM     Lab Results   Component Value Date/Time    LABAMPH Neg 09/28/2022 03:19 AM    LABAMPH Neg 09/28/2022 03:19 AM    BARBSCNU Neg 09/28/2022 03:19 AM    BARBSCNU Neg 09/28/2022 03:19 AM    LABBENZ Neg 09/28/2022 03:19 AM    LABBENZ Neg 09/28/2022 03:19 AM    LABMETH Neg 09/28/2022 03:19 AM    LABMETH Neg 09/28/2022 03:19 AM    OPIATESCREENURINE Neg 09/28/2022 03:19 AM    OPIATESCREENURINE Neg 09/28/2022 03:19 AM PHENCYCLIDINESCREENURINE Neg 09/28/2022 03:19 AM    PHENCYCLIDINESCREENURINE Neg 09/28/2022 03:19 AM    ETOH <10 09/28/2022 01:15 AM     No results found for: LITHIUM, DILFRTOT, VALPROATE    Assessment:   Transient quadriparesis after a lidocaine injection in the knee this does not quite fit into the category of side effects of lidocaine. She does have a spinal stimulator in the cervical spine and has spinal canal stenosis. Because of this we are not able to obtain an MRI we will obtain a CT scan of the neck and if this is negative patient will discharge. If this is abnormal then neurosurgical consultation may be required. Aurelio Merino MD, Ofelia Cherry, American Board of Psychiatry & Neurology  Board Certified in Vascular Neurology  Board Certified in Neuromuscular Medicine  Certified in [facility]:

## 2022-09-29 NOTE — FLOWSHEET NOTE
Discharge instructions given to patient at bedside. Reviewed home medication Made aware of upcoming apt. Keisha Anthony 37 delivered meds earlier in shift. IV removed Tele box sent to 1wt. Education given on new meds and dx. Patient verbalized understanding.

## 2022-09-29 NOTE — DISCHARGE SUMMARY
Hospital Medicine Discharge Summary    Catie Donis  :  1975  MRN:  [id number]    Admit date:  2022  Discharge date:  2022    Admitting Physician:  Ximena Oliveira DO  Primary Care Physician:  Georgina Bull DO      Discharge Diagnoses:    Principal Problem:    Transient paralysis  Active Problems:    HTN (hypertension)    HLD (hyperlipidemia)    Quadriparesis (HCC)    Cervical spinal stenosis    DMII (diabetes mellitus, type 2) ([facility] [address]  Resolved Problems:    * No resolved hospital problems. *      Hospital Course:   Catie Donis is a 52 y.o. female that was admitted and treated at [facility] for the following medical issues:     Principal Problem:    Transient paralysis  Active Problems:    HTN (hypertension)    HLD (hyperlipidemia)    Quadriparesis (HCC)    Cervical spinal stenosis    DMII (diabetes mellitus, type 2) ([facility] [address]  Resolved Problems:    * No resolved hospital problems. *     Patient states she woke from sleep and was paralyzed could not move her upper or lower extremities, but was able to speak. Gradually she regained motor of her upper extremities and was able to dial her phone  to call 911. In the emergency room she has had gradual improvement in the weakness over about 3 hours. No associated paresthesia. She admits to a headache after bleach exposure. She attributes her sx to recent lidocaine injection to the knee. Unclear etiology. Stroke work-up is negative. CT neck to investigate C-spine showed spine stimulator leads in place and degenerative disease without evidence of fracture or compression deformity. CT of the neck is notable for cervical lymphadenopathy, mediastinal adenopathy. Recommend f/u outpatient. On date of discharge symptoms have resolved and she is ambulatory. Neurology consulted, recommended CT of the neck due to C-spine stimulator and if okay will discharge home with outpatient follow-up with her Neurology Dr. Dorothy Franco.       Patient was seen by the following consultants while admitted to [facility]:   Consults:  [address] CARE NEEDS    Significant Diagnostic Studies:    CTA HEAD W WO CONTRAST    Result Date: 9/28/2022  EXAMINATION: CTA OF THE NECK; CTA OF THE HEAD WITHOUT AND WITH CONTRAST 9/28/2022 2:09 am TECHNIQUE: CTA of the neck was performed with the administration of intravenous contrast. Multiplanar reformatted images are provided for review. MIP images are provided for review. Stenosis of the internal carotid arteries measured using NASCET criteria. Automated exposure control, iterative reconstruction, and/or weight based adjustment of the mA/kV was utilized to reduce the radiation dose to as low as reasonably achievable. CTA of the head/brain was performed without and with the administration of intravenous contrast. Multiplanar reformatted images are provided for review. MIP images are provided for review. Automated exposure control, iterative reconstruction, and/or weight based adjustment of the mA/kV was utilized to reduce the radiation dose to as low as reasonably achievable. COMPARISON: None. HISTORY: ORDERING SYSTEM PROVIDED HISTORY: leg weakness TECHNOLOGIST PROVIDED HISTORY: Reason for exam:->leg weakness Decision Support Exception - unselect if not a suspected or confirmed emergency medical condition->Emergency Medical Condition (MA) What reading provider will be dictating this exam?->CRC; ORDERING SYSTEM PROVIDED HISTORY: bilateral leg weakness TECHNOLOGIST PROVIDED HISTORY: Reason for exam:->bilateral leg weakness Decision Support Exception - unselect if not a suspected or confirmed emergency medical condition->Emergency Medical Condition (MA) What reading provider will be dictating this exam?->CRC FINDINGS: CTA NECK: AORTIC ARCH/ARCH VESSELS: 2 vessel aortic arch is visualized. The origins of the great vessels off the aortic arch are unremarkable.  No dissection or arterial injury. No significant stenosis of the brachiocephalic or subclavian arteries. CAROTID ARTERIES: The right common carotid artery demonstrate no significant stenosis. Less than 50% luminal narrowing visualized in the right internal carotid artery at this origin best visualized on coronal series 602, image 294. The right external carotid artery is unremarkable. The left common carotid artery demonstrates no significant stenosis. The left internal and external carotid arteries demonstrate no evidence of significant stenosis. No dissection, arterial injury, or hemodynamically significant stenosis by NASCET criteria. VERTEBRAL ARTERIES: No significant atherosclerotic disease and calcification visualized at the origin of vertebral arteries of the subclavian vessels, dominant left vertebral artery is seen. Right vertebral artery is small in caliber but demonstrates no significant stenosis throughout its course in the V1 and V2 segments. No dissection, arterial injury, or significant stenosis. SOFT TISSUES: The lung apices demonstrate mild bronchovascular prominence but no evidence of focal infiltrate or consolidation common no evidence of pneumothorax no parenchymal lung mass. Prominent mediastinal lymph nodes visualized. Extensive cervical lymphadenopathy is seen. The larynx and pharynx are unremarkable. No acute abnormality of the salivary and thyroid glands. BONES: No acute osseous abnormality. CTA HEAD: Dominant left vertebral artery. Small right vertebral artery terminating medially as the PICA artery with a small V4 segment The basilar artery demonstrates unremarkable contours, unremarkable left AICAs, unremarkable superior cerebral arteries, no evidence of basilar tip aneurysm is seen.  The basilar artery terminates mainly as the left posterior cerebral artery, a small right P1 segment is seen, prominent right posterior communicating artery visualized partially supplying the right posterior cerebral artery. Bilateral posterior cerebral arteries are normal. Unremarkable petrous carotid is visualized bilaterally, bilateral cavernous and supraclinoid internal carotid arteries demonstrate unremarkable contours. The ICA termini are unremarkable bilaterally. Small right A1 segment otherwise unremarkable anterior and middle cerebral artery branches are seen. Extensive cervical lymphadenopathy. Less than 50% luminal narrowing at the origin of the right internal carotid artery. Otherwise unremarkable CTA of the neck. Unremarkable CTA of the head. SOFT TISSUES:       The lung apices demonstrate mild bronchovascular prominence but no evidence   of focal infiltrate or consolidation common no evidence of pneumothorax no   parenchymal lung mass. Prominent mediastinal lymph nodes visualized. Extensive cervical lymphadenopathy is seen. The larynx and pharynx are unremarkable. No acute abnormality of the salivary and thyroid glands. CTA NECK W WO CONTRAST    Result Date: 9/28/2022  EXAMINATION: CTA OF THE NECK; CTA OF THE HEAD WITHOUT AND WITH CONTRAST 9/28/2022 2:09 am TECHNIQUE: CTA of the neck was performed with the administration of intravenous contrast. Multiplanar reformatted images are provided for review. MIP images are provided for review. Stenosis of the internal carotid arteries measured using NASCET criteria. Automated exposure control, iterative reconstruction, and/or weight based adjustment of the mA/kV was utilized to reduce the radiation dose to as low as reasonably achievable. CTA of the head/brain was performed without and with the administration of intravenous contrast. Multiplanar reformatted images are provided for review. MIP images are provided for review. Automated exposure control, iterative reconstruction, and/or weight based adjustment of the mA/kV was utilized to reduce the radiation dose to as low as reasonably achievable. COMPARISON: None.  HISTORY: ORDERING SYSTEM PROVIDED HISTORY: leg weakness TECHNOLOGIST PROVIDED HISTORY: Reason for exam:->leg weakness Decision Support Exception - unselect if not a suspected or confirmed emergency medical condition->Emergency Medical Condition (MA) What reading provider will be dictating this exam?->CRC; ORDERING SYSTEM PROVIDED HISTORY: bilateral leg weakness TECHNOLOGIST PROVIDED HISTORY: Reason for exam:->bilateral leg weakness Decision Support Exception - unselect if not a suspected or confirmed emergency medical condition->Emergency Medical Condition (MA) What reading provider will be dictating this exam?->CRC FINDINGS: CTA NECK: AORTIC ARCH/ARCH VESSELS: 2 vessel aortic arch is visualized. The origins of the great vessels off the aortic arch are unremarkable. No dissection or arterial injury. No significant stenosis of the brachiocephalic or subclavian arteries. CAROTID ARTERIES: The right common carotid artery demonstrate no significant stenosis. Less than 50% luminal narrowing visualized in the right internal carotid artery at this origin best visualized on coronal series 602, image 294. The right external carotid artery is unremarkable. The left common carotid artery demonstrates no significant stenosis. The left internal and external carotid arteries demonstrate no evidence of significant stenosis. No dissection, arterial injury, or hemodynamically significant stenosis by NASCET criteria. VERTEBRAL ARTERIES: No significant atherosclerotic disease and calcification visualized at the origin of vertebral arteries of the subclavian vessels, dominant left vertebral artery is seen. Right vertebral artery is small in caliber but demonstrates no significant stenosis throughout its course in the V1 and V2 segments. No dissection, arterial injury, or significant stenosis.  SOFT TISSUES: The lung apices demonstrate mild bronchovascular prominence but no evidence of focal infiltrate or consolidation common no evidence of pneumothorax no parenchymal lung mass. Prominent mediastinal lymph nodes visualized. Extensive cervical lymphadenopathy is seen. The larynx and pharynx are unremarkable. No acute abnormality of the salivary and thyroid glands. BONES: No acute osseous abnormality. CTA HEAD: Dominant left vertebral artery. Small right vertebral artery terminating medially as the PICA artery with a small V4 segment The basilar artery demonstrates unremarkable contours, unremarkable left AICAs, unremarkable superior cerebral arteries, no evidence of basilar tip aneurysm is seen. The basilar artery terminates mainly as the left posterior cerebral artery, a small right P1 segment is seen, prominent right posterior communicating artery visualized partially supplying the right posterior cerebral artery. Bilateral posterior cerebral arteries are normal. Unremarkable petrous carotid is visualized bilaterally, bilateral cavernous and supraclinoid internal carotid arteries demonstrate unremarkable contours. The ICA termini are unremarkable bilaterally. Small right A1 segment otherwise unremarkable anterior and middle cerebral artery branches are seen. Extensive cervical lymphadenopathy. Less than 50% luminal narrowing at the origin of the right internal carotid artery. Otherwise unremarkable CTA of the neck. Unremarkable CTA of the head. XR CHEST PORTABLE    Result Date: 9/28/2022  EXAMINATION: ONE XRAY VIEW OF THE CHEST 9/28/2022 2:13 am COMPARISON: None. HISTORY: ORDERING SYSTEM PROVIDED HISTORY: presyncope TECHNOLOGIST PROVIDED HISTORY: Reason for exam:->presyncope Is the patient pregnant?->No FINDINGS: Normal cardiomediastinal silhouette. Lungs clear. No pneumothorax or effusion. Osseous thorax intact. Cervical spine stimulator leads noted. No acute disease. RECOMMENDATION: Careful clinical correlation and follow up recommended. CT CERVICAL SPINE WO CONTRAST   Final Result   Spinal cord stimulator leads visualized in position. Degenerative changes of the cervical spine. RECOMMENDATIONS:   Recommend clinical correlation. XR CHEST PORTABLE   Final Result   No acute disease. RECOMMENDATION:   Careful clinical correlation and follow up recommended. CTA HEAD W WO CONTRAST   Final Result   Extensive cervical lymphadenopathy. Less than 50% luminal narrowing at the origin of the right internal carotid   artery. Otherwise unremarkable CTA of the neck. Unremarkable CTA of the head. CTA NECK W WO CONTRAST   Final Result   Extensive cervical lymphadenopathy. Less than 50% luminal narrowing at the origin of the right internal carotid   artery. Otherwise unremarkable CTA of the neck. Unremarkable CTA of the head. Discharge Medications:         Medication List        START taking these medications      aspirin 81 MG EC tablet  Take 1 tablet by mouth daily            CHANGE how you take these medications      losartan 50 MG tablet  Commonly known as: COZAAR  Take 1 tablet by mouth daily  What changed:   medication strength  additional instructions     * OXcarbazepine 150 MG tablet  Commonly known as: TRILEPTAL  What changed: Another medication with the same name was added. Make sure you understand how and when to take each. * OXcarbazepine 150 MG tablet  Commonly known as: TRILEPTAL  Take 2 tablets by mouth 2 times daily  What changed: You were already taking a medication with the same name, and this prescription was added. Make sure you understand how and when to take each. * This list has 2 medication(s) that are the same as other medications prescribed for you. Read the directions carefully, and ask your doctor or other care provider to review them with you.                 CONTINUE taking these medications      Aleve 220 MG Caps  Generic drug: Naproxen Sodium     atorvastatin 20 MG tablet  Commonly known as: LIPITOR     fluvoxaMINE 50 MG tablet  Commonly known as: LUVOX     MULTIVITAMIN ADULT PO     omeprazole 10 MG delayed release capsule  Commonly known as: PRILOSEC     Ozempic (0.25 or 0.5 MG/DOSE) 2 MG/1.5ML Sopn  Generic drug: Semaglutide(0.25 or 0.5MG/DOS)     pioglitazone 30 MG tablet  Commonly known as: ACTOS     tiZANidine 2 MG tablet  Commonly known as: ZANAFLEX     vitamin B-12 100 MCG tablet  Commonly known as: CYANOCOBALAMIN            STOP taking these medications      albuterol sulfate  (90 Base) MCG/ACT inhaler  Commonly known as: ProAir HFA     amitriptyline 25 MG tablet  Commonly known as: ELAVIL     busPIRone 15 MG tablet  Commonly known as: BUSPAR     Calcium Carbonate 500 MG Chew     gabapentin 300 MG capsule  Commonly known as: NEURONTIN     glimepiride 4 MG tablet  Commonly known as: AMARYL     metFORMIN 500 MG tablet  Commonly known as: GLUCOPHAGE     OXYCONTIN PO     sertraline 100 MG tablet  Commonly known as: ZOLOFT               Where to Get Your Medications        These medications were sent to [address] [place] [address], [address] [place] [address]      Phone: [phone]   aspirin 81 MG EC tablet  losartan 50 MG tablet  OXcarbazepine 150 MG tablet         Disposition:   Discharged to Home. Any [facility] needs that were indicated and/or required as been addressed and set up by Social Work. Condition at discharge: Pt was medically stable at the time of discharge. Significant improvement in clinical condition compared to initial condition at presentation to hospital    Activity: activity as tolerated, fall precautions. Total time taken for discharging this patient: 40 minutes. Greater than 70% of time was spent focused exclusively on this patient. Time was taken to review chart, discuss plans with consultants, reconciling medications, discussing plan answering questions with patient.      Gloria Damon DO  9/29/2022, 1:39 PM  ----------------------------------------------------------------------------------------------------------------------    Michael Norris,     Please return to ER or call 911 if you develop any significant signs or symptoms. I may not have addressed all of your medical illnesses or the abnormal blood work or imaging therefore please ask your PCP RUPALI GODDARD DO and other out patient specialists and providers  to obtain [facility] record entirely to follow up on all of the abnormal labs, imaging and findings that I have and have not addressed during your hospitalization. Discharging you from the hospital does not mean that your medical care ends here and now. You may still need additional work up, investigation, monitoring, and treatment to be handled from this point on by outside providers including your PCP, RUPALI GODDARD DO , Specialists and other healthcare providers. Please review your list of discharge medications prior to resuming medications you might still have at home, as the medications you need to be taking, dosages or how often you must take them may have changed. For medication questions, contact your retail pharmacy and your PCP, RUPALI GODDARD DO .     ** I STRONGLY RECOMMEND that you follow up with RUPALI GODDARD DO within 3 to 5 days for a post hospitalization evaluation. This specific office visit is covered by your insurance, and is not the same as your annual doctor visit/ check up. This office visit is important, as it may prevent need for repeat and/or future hospitalizations. **    Your medical team at [facility] ([facility]) appreciates the opportunity to work with you to get well!     Sincerely,  Burnard Sandhoff, DO

## 2022-09-29 NOTE — PROGRESS NOTES
CLINICAL PHARMACY NOTE: MEDS TO BEDS    Total # of Prescriptions Filled: 3   The following medications were delivered to the patient:  Aspirin 81mg tab  Losartan 50mg tab  Oxcarbazepine 150mg tab     Additional Documentation:

## 2022-09-29 NOTE — CARE COORDINATION
Met with patient. Patient agreeable outpatient therapy [facility] however they do not accept her insurance. Requested [id number] however they too were also not in network with her insurance. Referral sent to [facility]. Pending referral. Patient plans to d/c home today once Bishnu Head arranged and cleared by neuro.

## 2022-09-29 NOTE — DISCHARGE INSTR - DIET

## 2022-10-02 NOTE — PROGRESS NOTES
Physical Therapy  Facility/Department: [facility] MED SURG [id number]  Physical Therapy Discharge      NAME: Douglas Evans    : 1975 (52 y.o.)  MRN: [id number]    Account: [de-identified]  Gender: female      Patient has been discharged from acute care hospital. DC patient from current PT program.      Electronically signed by Catrina Lowe PT on 10/2/22 at 3:27 PM EDT

## 2023-02-02 PROBLEM — H52.4 HYPEROPIA OF BOTH EYES WITH ASTIGMATISM AND PRESBYOPIA: Status: ACTIVE | Noted: 2023-02-02

## 2023-02-02 PROBLEM — B37.2 YEAST INFECTION OF THE SKIN: Status: ACTIVE | Noted: 2023-02-02

## 2023-02-02 PROBLEM — G47.00 INSOMNIA: Status: ACTIVE | Noted: 2023-02-02

## 2023-02-02 PROBLEM — J04.0 LARYNGITIS: Status: ACTIVE | Noted: 2023-02-02

## 2023-02-02 PROBLEM — D72.829 ELEVATED WBC COUNT: Status: ACTIVE | Noted: 2023-02-02

## 2023-02-02 PROBLEM — R87.610 ASCUS WITH POSITIVE HIGH RISK HPV CERVICAL: Status: ACTIVE | Noted: 2023-02-02

## 2023-02-02 PROBLEM — F41.0 PANIC ANXIETY SYNDROME: Status: ACTIVE | Noted: 2023-02-02

## 2023-02-02 PROBLEM — H52.203 HYPEROPIA OF BOTH EYES WITH ASTIGMATISM AND PRESBYOPIA: Status: ACTIVE | Noted: 2023-02-02

## 2023-02-02 PROBLEM — M25.569 KNEE PAIN: Status: ACTIVE | Noted: 2023-02-02

## 2023-02-02 PROBLEM — I95.1 AUTONOMIC ORTHOSTATIC HYPOTENSION: Status: ACTIVE | Noted: 2023-02-02

## 2023-02-02 PROBLEM — R25.1 TREMOR: Status: ACTIVE | Noted: 2023-02-02

## 2023-02-02 PROBLEM — R46.81 OBSESSIVE-COMPULSIVE BEHAVIOR: Status: ACTIVE | Noted: 2023-02-02

## 2023-02-02 PROBLEM — H52.03 HYPEROPIA OF BOTH EYES WITH ASTIGMATISM AND PRESBYOPIA: Status: ACTIVE | Noted: 2023-02-02

## 2023-02-02 PROBLEM — H04.129 DRY EYE SYNDROME: Status: ACTIVE | Noted: 2023-02-02

## 2023-02-02 PROBLEM — D64.9 ANEMIA: Status: ACTIVE | Noted: 2023-02-02

## 2023-02-02 PROBLEM — K21.9 GERD (GASTROESOPHAGEAL REFLUX DISEASE): Status: ACTIVE | Noted: 2023-02-02

## 2023-02-02 PROBLEM — G89.29 BACK PAIN, CHRONIC: Status: ACTIVE | Noted: 2023-02-02

## 2023-02-02 PROBLEM — J20.9 ACUTE BRONCHITIS: Status: ACTIVE | Noted: 2023-02-02

## 2023-02-02 PROBLEM — N76.3 CHRONIC VULVITIS: Status: ACTIVE | Noted: 2023-02-02

## 2023-02-02 PROBLEM — H60.543 ECZEMA OF BOTH EXTERNAL EARS: Status: ACTIVE | Noted: 2023-02-02

## 2023-02-02 PROBLEM — H50.112 EXOTROPIA OF LEFT EYE: Status: ACTIVE | Noted: 2023-02-02

## 2023-02-02 PROBLEM — Z98.890: Status: ACTIVE | Noted: 2023-02-02

## 2023-02-02 PROBLEM — L01.00 IMPETIGO: Status: ACTIVE | Noted: 2023-02-02

## 2023-02-02 PROBLEM — E66.9 OBESITY: Status: ACTIVE | Noted: 2023-02-02

## 2023-02-02 PROBLEM — R79.89 ELEVATED PLATELET COUNT: Status: ACTIVE | Noted: 2023-02-02

## 2023-02-02 PROBLEM — R55 PRE-SYNCOPE: Status: ACTIVE | Noted: 2023-02-02

## 2023-02-02 PROBLEM — R10.9 LEFT FLANK PAIN: Status: ACTIVE | Noted: 2023-02-02

## 2023-02-02 PROBLEM — R21 RASH: Status: ACTIVE | Noted: 2023-02-02

## 2023-02-02 PROBLEM — M79.7 FIBROMYALGIA: Status: ACTIVE | Noted: 2023-02-02

## 2023-02-02 PROBLEM — R91.1 PULMONARY NODULE: Status: ACTIVE | Noted: 2023-02-02

## 2023-02-02 PROBLEM — M62.469 GASTROCNEMIUS EQUINUS: Status: ACTIVE | Noted: 2023-02-02

## 2023-02-02 PROBLEM — M54.9 BACK PAIN, CHRONIC: Status: ACTIVE | Noted: 2023-02-02

## 2023-02-02 PROBLEM — G56.00 CTS (CARPAL TUNNEL SYNDROME): Status: ACTIVE | Noted: 2023-02-02

## 2023-02-02 PROBLEM — B37.0 THRUSH: Status: ACTIVE | Noted: 2023-02-02

## 2023-02-02 PROBLEM — M72.2 PLANTAR FASCIITIS: Status: ACTIVE | Noted: 2023-02-02

## 2023-02-02 PROBLEM — G47.8 SLEEP PARALYSIS: Status: ACTIVE | Noted: 2023-02-02

## 2023-02-02 PROBLEM — R11.0 NAUSEA: Status: ACTIVE | Noted: 2023-02-02

## 2023-02-02 PROBLEM — E11.9 DIABETES MELLITUS TYPE 2 WITHOUT RETINOPATHY (MULTI): Status: ACTIVE | Noted: 2023-02-02

## 2023-02-02 PROBLEM — M25.373 INSTABILITY OF ANKLE: Status: ACTIVE | Noted: 2023-02-02

## 2023-02-02 PROBLEM — R19.7 DIARRHEA: Status: ACTIVE | Noted: 2023-02-02

## 2023-02-02 PROBLEM — M17.10 ARTHRITIS OF KNEE: Status: ACTIVE | Noted: 2023-02-02

## 2023-02-02 PROBLEM — J06.9 VIRAL URI WITH COUGH: Status: ACTIVE | Noted: 2023-02-02

## 2023-02-02 PROBLEM — R87.810 ASCUS WITH POSITIVE HIGH RISK HPV CERVICAL: Status: ACTIVE | Noted: 2023-02-02

## 2023-02-02 PROBLEM — R87.612 LGSIL OF CERVIX OF UNDETERMINED SIGNIFICANCE: Status: ACTIVE | Noted: 2023-02-02

## 2023-02-02 PROBLEM — R39.9 UTI SYMPTOMS: Status: ACTIVE | Noted: 2023-02-02

## 2023-02-02 PROBLEM — B35.4 TINEA CORPORIS: Status: ACTIVE | Noted: 2023-02-02

## 2023-02-02 PROBLEM — R35.0 INCREASED FREQUENCY OF URINATION: Status: ACTIVE | Noted: 2023-02-02

## 2023-02-02 PROBLEM — B02.9 SHINGLES: Status: ACTIVE | Noted: 2023-02-02

## 2023-02-02 PROBLEM — Z98.890 HISTORY OF STRABISMUS SURGERY: Status: ACTIVE | Noted: 2023-02-02

## 2023-02-02 RX ORDER — EPINEPHRINE 0.3 MG/.3ML
INJECTION SUBCUTANEOUS
COMMUNITY
Start: 2017-05-01 | End: 2023-03-16 | Stop reason: SDUPTHER

## 2023-02-02 RX ORDER — FLUVOXAMINE MALEATE 50 MG/1
0.25 TABLET, FILM COATED ORAL 2 TIMES DAILY
COMMUNITY
Start: 2022-03-04 | End: 2023-06-15 | Stop reason: SDUPTHER

## 2023-02-02 RX ORDER — OXCARBAZEPINE 150 MG/1
1 TABLET, FILM COATED ORAL 3 TIMES DAILY
COMMUNITY
Start: 2018-02-26 | End: 2023-03-16 | Stop reason: ALTCHOICE

## 2023-02-02 RX ORDER — ACETAMINOPHEN AND PHENYLEPHRINE HCL 325; 5 MG/1; MG/1
TABLET ORAL
COMMUNITY
Start: 2020-10-20

## 2023-02-02 RX ORDER — LOSARTAN POTASSIUM 50 MG/1
1 TABLET ORAL DAILY
COMMUNITY
Start: 2017-02-24 | End: 2023-03-06 | Stop reason: SDUPTHER

## 2023-02-02 RX ORDER — OMEPRAZOLE 10 MG/1
1 CAPSULE, DELAYED RELEASE ORAL DAILY
COMMUNITY
End: 2024-01-04

## 2023-02-02 RX ORDER — TRAMADOL HYDROCHLORIDE 50 MG/1
50 TABLET ORAL EVERY 12 HOURS PRN
COMMUNITY
End: 2023-03-16 | Stop reason: ALTCHOICE

## 2023-02-02 RX ORDER — ACAR/CYST/ALA/Q10/P.SER/BROCC 800-300 MG
POWDER IN PACKET (EA) ORAL
COMMUNITY
Start: 2020-10-20

## 2023-02-02 RX ORDER — BLOOD SUGAR DIAGNOSTIC
STRIP MISCELLANEOUS DAILY
COMMUNITY
Start: 2016-12-28 | End: 2023-04-17 | Stop reason: ALTCHOICE

## 2023-02-02 RX ORDER — PIOGLITAZONEHYDROCHLORIDE 30 MG/1
1 TABLET ORAL DAILY
COMMUNITY
Start: 2017-03-15 | End: 2023-03-16

## 2023-02-02 RX ORDER — ATORVASTATIN CALCIUM 40 MG/1
1 TABLET, FILM COATED ORAL NIGHTLY
COMMUNITY
Start: 2017-02-24 | End: 2023-06-12

## 2023-03-06 DIAGNOSIS — I10 HYPERTENSION, UNSPECIFIED TYPE: Primary | ICD-10-CM

## 2023-03-06 RX ORDER — LOSARTAN POTASSIUM 50 MG/1
50 TABLET ORAL DAILY
Qty: 30 TABLET | Refills: 11 | Status: SHIPPED | OUTPATIENT
Start: 2023-03-06 | End: 2023-09-14 | Stop reason: SDUPTHER

## 2023-03-09 PROBLEM — M17.11 RIGHT KNEE DJD: Status: ACTIVE | Noted: 2023-03-09

## 2023-03-09 RX ORDER — OXYCODONE HYDROCHLORIDE 5 MG/1
5 CAPSULE ORAL EVERY 4 HOURS PRN
COMMUNITY
End: 2023-03-16 | Stop reason: ALTCHOICE

## 2023-03-09 RX ORDER — OXYCODONE AND ACETAMINOPHEN 5; 325 MG/1; MG/1
1 TABLET ORAL EVERY 6 HOURS PRN
COMMUNITY
End: 2023-03-16 | Stop reason: ALTCHOICE

## 2023-03-09 RX ORDER — OXCARBAZEPINE 300 MG/1
150 TABLET, FILM COATED ORAL 2 TIMES DAILY
COMMUNITY

## 2023-03-10 DIAGNOSIS — E11.9 DIABETES MELLITUS TYPE 2 WITHOUT RETINOPATHY (MULTI): Primary | ICD-10-CM

## 2023-03-16 ENCOUNTER — OFFICE VISIT (OUTPATIENT)
Dept: PRIMARY CARE | Facility: CLINIC | Age: 48
End: 2023-03-16
Payer: MEDICARE

## 2023-03-16 VITALS
DIASTOLIC BLOOD PRESSURE: 62 MMHG | HEIGHT: 63 IN | BODY MASS INDEX: 31.54 KG/M2 | SYSTOLIC BLOOD PRESSURE: 102 MMHG | WEIGHT: 178 LBS | TEMPERATURE: 97.1 F

## 2023-03-16 DIAGNOSIS — Z91.030 ALLERGY TO YELLOW JACKETS: ICD-10-CM

## 2023-03-16 DIAGNOSIS — M17.11 PRIMARY OSTEOARTHRITIS OF RIGHT KNEE: ICD-10-CM

## 2023-03-16 DIAGNOSIS — E11.9 DIABETES MELLITUS TYPE 2 WITHOUT RETINOPATHY (MULTI): Primary | ICD-10-CM

## 2023-03-16 PROCEDURE — 3078F DIAST BP <80 MM HG: CPT | Performed by: FAMILY MEDICINE

## 2023-03-16 PROCEDURE — 4010F ACE/ARB THERAPY RXD/TAKEN: CPT | Performed by: FAMILY MEDICINE

## 2023-03-16 PROCEDURE — 3008F BODY MASS INDEX DOCD: CPT | Performed by: FAMILY MEDICINE

## 2023-03-16 PROCEDURE — 1036F TOBACCO NON-USER: CPT | Performed by: FAMILY MEDICINE

## 2023-03-16 PROCEDURE — 99214 OFFICE O/P EST MOD 30 MIN: CPT | Performed by: FAMILY MEDICINE

## 2023-03-16 PROCEDURE — 3074F SYST BP LT 130 MM HG: CPT | Performed by: FAMILY MEDICINE

## 2023-03-16 RX ORDER — LANCETS 33 GAUGE
EACH MISCELLANEOUS
COMMUNITY
Start: 2023-01-19 | End: 2023-04-17 | Stop reason: SDUPTHER

## 2023-03-16 RX ORDER — MULTIVITAMIN
1 TABLET ORAL DAILY
COMMUNITY
Start: 2018-08-20

## 2023-03-16 RX ORDER — EPINEPHRINE 0.3 MG/.3ML
INJECTION SUBCUTANEOUS
Qty: 2 EACH | Refills: 1 | Status: SHIPPED | OUTPATIENT
Start: 2023-03-16

## 2023-03-16 RX ORDER — LANCETS 30 GAUGE
EACH MISCELLANEOUS
COMMUNITY
Start: 2023-01-19

## 2023-03-16 RX ORDER — ACETAMINOPHEN 500 MG
TABLET ORAL
COMMUNITY

## 2023-03-16 RX ORDER — PIOGLITAZONEHYDROCHLORIDE 30 MG/1
TABLET ORAL
Qty: 30 TABLET | Refills: 3 | Status: SHIPPED | OUTPATIENT
Start: 2023-03-16 | End: 2023-06-09

## 2023-03-16 ASSESSMENT — PATIENT HEALTH QUESTIONNAIRE - PHQ9
2. FEELING DOWN, DEPRESSED OR HOPELESS: NOT AT ALL
1. LITTLE INTEREST OR PLEASURE IN DOING THINGS: NOT AT ALL
SUM OF ALL RESPONSES TO PHQ9 QUESTIONS 1 AND 2: 0

## 2023-03-16 ASSESSMENT — ENCOUNTER SYMPTOMS: DEPRESSION: 0

## 2023-03-16 NOTE — PATIENT INSTRUCTIONS
You seem to be recovering well from your knee replacement.  Continue your exercise program and maintaining a healthy diet.  Continue with your weight loss plan. I will refill your EpiPen and Ozempic.  Return in three months for a recheck.

## 2023-03-16 NOTE — PROGRESS NOTES
"Subjective   Patient ID: [id number]     Bhavani Carr is a 48 y.o. female who presents for Med Refill (Requesting order for Epi-Pen.).  HPI  She is here for a recheck.     She had knee replacement surgery.  She was released from PT after 7 visits.  She is in [employer]ne[employer].  She is working with weights and in the pool and on a bike.  She is doing the twice a week.  She is on ozempic for diabetes.  She is losing weight.  Trying to restrict sugars from the diet.  Glucose has been well controlled.  Highest 130-150s.    Objective     /62 (BP Location: Right arm, Patient Position: Sitting)   Temp 36.2 °C (97.1 °F)   Ht 1.6 m (5' 3\")   Wt 80.7 kg (178 lb)   BMI 31.53 kg/m²      Physical Exam  Constitutional:       Appearance: Normal appearance.   Cardiovascular:      Rate and Rhythm: Normal rate and regular rhythm.   Pulmonary:      Effort: Pulmonary effort is normal.      Breath sounds: Normal breath sounds.   Musculoskeletal:      Cervical back: Neck supple.      Comments: Walking without aids.  Had right TKA.  Tender along the quadriceps tendon.   Neurological:      Mental Status: She is alert.         Assessment/Plan   Problem List Items Addressed This Visit          Musculoskeletal    Right knee DJD       Endocrine/Metabolic    Diabetes mellitus type 2 without retinopathy (CMS/HCC) - Primary    Relevant Medications    semaglutide (OZEMPIC) 1 mg/dose (4 mg/3 mL) pen injector     Other Visit Diagnoses       Allergy to yellow jackets        Relevant Medications    EPINEPHrine 0.3 mg/0.3 mL injection syringe        You seem to be recovering well from your knee replacement.  Continue your exercise program and maintaining a healthy diet.  Continue with your weight loss plan. I will refill your EpiPen and Ozempic.  Return in three months for a recheck.    Hossein Jimenez, DO   "

## 2023-04-17 DIAGNOSIS — E11.9 DIABETES MELLITUS TYPE 2 WITHOUT RETINOPATHY (MULTI): Primary | ICD-10-CM

## 2023-04-17 RX ORDER — LANCETS 33 GAUGE
1 EACH MISCELLANEOUS 3 TIMES DAILY
Qty: 100 EACH | Refills: 1 | Status: SHIPPED | OUTPATIENT
Start: 2023-04-17

## 2023-04-17 NOTE — TELEPHONE ENCOUNTER
Patient requesting refills or diabetic supplies.  She uses [facility] and needs the followin)  [employer] test strips  2)  Lancets

## 2023-06-05 ENCOUNTER — TELEPHONE (OUTPATIENT)
Dept: PRIMARY CARE | Facility: CLINIC | Age: 48
End: 2023-06-05
Payer: MEDICARE

## 2023-06-05 DIAGNOSIS — E11.9 DIABETES MELLITUS TYPE 2 WITHOUT RETINOPATHY (MULTI): ICD-10-CM
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
## 2023-06-09 DIAGNOSIS — E11.9 DIABETES MELLITUS TYPE 2 WITHOUT RETINOPATHY (MULTI): ICD-10-CM

## 2023-06-09 RX ORDER — PIOGLITAZONEHYDROCHLORIDE 30 MG/1
TABLET ORAL
Qty: 90 TABLET | Refills: 1 | Status: SHIPPED | OUTPATIENT
Start: 2023-06-09 | End: 2023-09-14 | Stop reason: SDUPTHER

## 2023-06-10 DIAGNOSIS — E11.9 TYPE 2 DIABETES MELLITUS WITHOUT COMPLICATIONS (MULTI): ICD-10-CM

## 2023-06-12 RX ORDER — ATORVASTATIN CALCIUM 40 MG/1
TABLET, FILM COATED ORAL
Qty: 90 TABLET | Refills: 1 | Status: SHIPPED | OUTPATIENT
Start: 2023-06-12 | End: 2023-09-14 | Stop reason: SDUPTHER

## 2023-06-15 ENCOUNTER — OFFICE VISIT (OUTPATIENT)
Dept: PRIMARY CARE | Facility: CLINIC | Age: 48
End: 2023-06-15
Payer: MEDICARE

## 2023-06-15 VITALS
HEIGHT: 63 IN | WEIGHT: 169 LBS | BODY MASS INDEX: 29.95 KG/M2 | TEMPERATURE: 98.5 F | DIASTOLIC BLOOD PRESSURE: 64 MMHG | SYSTOLIC BLOOD PRESSURE: 112 MMHG

## 2023-06-15 DIAGNOSIS — Z00.00 ROUTINE GENERAL MEDICAL EXAMINATION AT HEALTH CARE FACILITY: Primary | ICD-10-CM

## 2023-06-15 DIAGNOSIS — F42.9 OBSESSIVE-COMPULSIVE DISORDER, UNSPECIFIED TYPE: ICD-10-CM

## 2023-06-15 DIAGNOSIS — E11.9 DIABETES MELLITUS TYPE 2 WITHOUT RETINOPATHY (MULTI): ICD-10-CM

## 2023-06-15 PROCEDURE — 3008F BODY MASS INDEX DOCD: CPT | Performed by: FAMILY MEDICINE

## 2023-06-15 PROCEDURE — 4010F ACE/ARB THERAPY RXD/TAKEN: CPT | Performed by: FAMILY MEDICINE

## 2023-06-15 PROCEDURE — G0439 PPPS, SUBSEQ VISIT: HCPCS | Performed by: FAMILY MEDICINE

## 2023-06-15 PROCEDURE — 99396 PREV VISIT EST AGE 40-64: CPT | Performed by: FAMILY MEDICINE

## 2023-06-15 PROCEDURE — 1036F TOBACCO NON-USER: CPT | Performed by: FAMILY MEDICINE

## 2023-06-15 PROCEDURE — 3074F SYST BP LT 130 MM HG: CPT | Performed by: FAMILY MEDICINE

## 2023-06-15 PROCEDURE — 3078F DIAST BP <80 MM HG: CPT | Performed by: FAMILY MEDICINE

## 2023-06-15 RX ORDER — FLUVOXAMINE MALEATE 25 MG/1
12.5 TABLET ORAL NIGHTLY
Qty: 45 TABLET | Refills: 0 | Status: SHIPPED | OUTPATIENT
Start: 2023-06-15 | End: 2023-09-14 | Stop reason: SDUPTHER

## 2023-06-15 RX ORDER — FLUVOXAMINE MALEATE 25 MG/1
12.5 TABLET ORAL NIGHTLY
Qty: 45 TABLET | Refills: 0 | Status: SHIPPED | OUTPATIENT
Start: 2023-06-15 | End: 2023-06-15 | Stop reason: SDUPTHER

## 2023-06-15 ASSESSMENT — ENCOUNTER SYMPTOMS
SORE THROAT: 0
SINUS PRESSURE: 0
NECK PAIN: 1
VOICE CHANGE: 0
HEADACHES: 0
PALPITATIONS: 0
DIARRHEA: 0
BLOOD IN STOOL: 0
COUGH: 0
DIZZINESS: 1
NUMBNESS: 0
VOMITING: 0
DEPRESSION: 0
FEVER: 0
CONSTIPATION: 0
WEAKNESS: 0
WOUND: 0
LOSS OF SENSATION IN FEET: 0
NAUSEA: 0
FREQUENCY: 0
HEMATURIA: 0
DYSPHORIC MOOD: 0
FATIGUE: 0
RHINORRHEA: 0
SHORTNESS OF BREATH: 0
OCCASIONAL FEELINGS OF UNSTEADINESS: 0
WHEEZING: 0
ADENOPATHY: 0
BACK PAIN: 0
ROS SKIN COMMENTS: NO MOLES GROWING OR CHANGING.
MYALGIAS: 0
ARTHRALGIAS: 0
ABDOMINAL PAIN: 0

## 2023-06-15 ASSESSMENT — ACTIVITIES OF DAILY LIVING (ADL)
GROCERY_SHOPPING: INDEPENDENT
MANAGING_FINANCES: INDEPENDENT
BATHING: INDEPENDENT
TAKING_MEDICATION: INDEPENDENT
DRESSING: INDEPENDENT
DOING_HOUSEWORK: INDEPENDENT

## 2023-06-15 ASSESSMENT — PATIENT HEALTH QUESTIONNAIRE - PHQ9
2. FEELING DOWN, DEPRESSED OR HOPELESS: NOT AT ALL
SUM OF ALL RESPONSES TO PHQ9 QUESTIONS 1 AND 2: 0
1. LITTLE INTEREST OR PLEASURE IN DOING THINGS: NOT AT ALL

## 2023-06-15 NOTE — PROGRESS NOTES
"Subjective   Reason for Visit: Bhavani Carr is an 48 y.o. female here for a Medicare Wellness visit.          Reviewed all medications by prescribing practitioner or clinical pharmacist (such as prescriptions, OTCs, herbal therapies and supplements) and documented in the medical record.    HPI    Patient Care Team:  Hossein Jimenez DO as PCP - General  Hossein Jimenez DO as PCP - [employer] PCP     has advanced directives.  Full code.  Will bring in copy for the chart.    Declines mammogram.  Recommended every year.      Had a colonoscopy in 2010.  None since.    Recommended get this at age 45 every ten years.  She declines.  Declines cologuard.    Past smoker of cigarettes.  Now vapes only.  Quit smoking cigarettes one year ago.    Three or four drinks once a week.    No drug use. Except marijuana vape.  Prescribed.    Review of Systems   Constitutional:  Negative for fatigue and fever.   HENT:  Negative for rhinorrhea, sinus pressure, sore throat and voice change.    Respiratory:  Negative for cough, shortness of breath and wheezing.    Cardiovascular:  Negative for chest pain, palpitations and leg swelling.   Gastrointestinal:  Negative for abdominal pain, blood in stool, constipation, diarrhea, nausea and vomiting.   Genitourinary:  Negative for frequency, hematuria and vaginal bleeding.   Musculoskeletal:  Positive for neck pain (cervical spinal stenosis.  C2/C6). Negative for arthralgias, back pain and myalgias.        Right knee surgery 12/22.   Skin:  Negative for rash and wound.        No moles growing or changing.   Neurological:  Positive for dizziness (POTS). Negative for syncope, weakness (has \"quadriplegia\" in the record but it was actually thought to be sleep paralysis. for 18 hours.), numbness and headaches.   Hematological:  Negative for adenopathy.   Psychiatric/Behavioral:  Negative for dysphoric mood.        Objective   Vitals:  /64 (BP Location: Left arm, Patient " "Position: Sitting)   Temp 36.9 °C (98.5 °F)   Ht 1.6 m (5' 3\")   Wt 76.7 kg (169 lb)   BMI 29.94 kg/m²       Physical Exam  Vitals reviewed.   Constitutional:       General: She is not in acute distress.     Appearance: Normal appearance. She is not ill-appearing or toxic-appearing.   HENT:      Head: Normocephalic and atraumatic.      Right Ear: Tympanic membrane, ear canal and external ear normal.      Left Ear: Tympanic membrane, ear canal and external ear normal.      Nose: Nose normal.      Mouth/Throat:      Mouth: Mucous membranes are moist.   Eyes:      Extraocular Movements: Extraocular movements intact.      Conjunctiva/sclera: Conjunctivae normal.      Pupils: Pupils are equal, round, and reactive to light.   Cardiovascular:      Rate and Rhythm: Normal rate and regular rhythm.      Heart sounds: No murmur heard.  Pulmonary:      Effort: Pulmonary effort is normal.      Breath sounds: Normal breath sounds.   Abdominal:      General: Bowel sounds are normal. There is no distension.      Palpations: Abdomen is soft. There is no mass.      Tenderness: There is no abdominal tenderness. There is no guarding or rebound.   Musculoskeletal:         General: No tenderness.      Cervical back: Neck supple.      Right lower leg: No edema.      Left lower leg: No edema.   Skin:     Coloration: Skin is not jaundiced or pale.      Findings: No rash.   Neurological:      General: No focal deficit present.      Mental Status: She is alert and oriented to person, place, and time. Mental status is at baseline.   Psychiatric:         Mood and Affect: Mood normal.         Behavior: Behavior normal.         Thought Content: Thought content normal.         Judgment: Judgment normal.         Assessment/Plan   Problem List Items Addressed This Visit          Endocrine/Metabolic    Diabetes mellitus type 2 without retinopathy (CMS/HCC)    Relevant Medications    semaglutide 2 mg/dose (8 mg/3 mL) pen injector     Other Visit " Diagnoses       Routine general medical examination at health care facility    -  Primary    Relevant Orders    1 Year Follow Up In Primary Care - Wellness Exam    Obsessive-compulsive disorder, unspecified type        Relevant Medications    fLuvoxaMINE (Luvox) 25 mg tablet             Annual Wellness exam completed   Preventive Health history reviewed:  Labs ordered    Mammogram recommended and declined by patient.  BMD not indicated due to age.  Cscope or Cologuard recommended and declined by patient.  Low dose CT chest for lung cancer screening not indicated d/t age.  Depression Screening done.  OCD is well controlled.  Advanced Directives Discussion Completed  Cardiovascular risk discussed and if needed, lifestyle modifications recommended, including nutritional choices, exercise, and elimination of habits contributing to risk.  We agreed on a plan to reduce the current cardiovascular risk.  See ecalc ASCVD Risk  Plus.  Weight loss is recommended.  Continue present medication.     Exercising regularly.  Vaccines:  Influenza allergic.  Prevnar 20 not indicated due to age.  Prevnar 13 not indicated due to age.  Pneumovax 23 not indicated due to age.  Shingrix not indicated d/t age.  Tdap done 2019    Last PAP 2015.  Had hysterectomy.    I increased your ozempic to 2 mg weekly.  Return in three months for a recheck.

## 2023-07-28 ENCOUNTER — TELEPHONE (OUTPATIENT)
Dept: PRIMARY CARE | Facility: CLINIC | Age: 48
End: 2023-07-28
Payer: MEDICARE

## 2023-07-28 DIAGNOSIS — E11.9 DIABETES MELLITUS TYPE 2 WITHOUT RETINOPATHY (MULTI): Primary | ICD-10-CM

## 2023-07-28 DIAGNOSIS — E11.9 TYPE 2 DIABETES MELLITUS WITHOUT COMPLICATION, WITHOUT LONG-TERM CURRENT USE OF INSULIN (MULTI): Primary | ICD-10-CM

## 2023-07-28 RX ORDER — SEMAGLUTIDE 2.4 MG/.75ML
2.4 INJECTION, SOLUTION SUBCUTANEOUS
Qty: 9 ML | Refills: 0 | Status: SHIPPED | OUTPATIENT
Start: 2023-07-28 | End: 2023-09-14 | Stop reason: SDUPTHER

## 2023-07-28 NOTE — TELEPHONE ENCOUNTER
[facility] Ozempic 2mg is on back order and they would like to know if you would like to change the medication.   - [phone]  Ref #[id number]

## 2023-09-14 ENCOUNTER — OFFICE VISIT (OUTPATIENT)
Dept: PRIMARY CARE | Facility: CLINIC | Age: 48
End: 2023-09-14
Payer: MEDICARE

## 2023-09-14 VITALS
SYSTOLIC BLOOD PRESSURE: 110 MMHG | TEMPERATURE: 98.7 F | DIASTOLIC BLOOD PRESSURE: 68 MMHG | WEIGHT: 167 LBS | BODY MASS INDEX: 30.54 KG/M2

## 2023-09-14 DIAGNOSIS — G90.A POTS (POSTURAL ORTHOSTATIC TACHYCARDIA SYNDROME): ICD-10-CM

## 2023-09-14 DIAGNOSIS — I10 HYPERTENSION, UNSPECIFIED TYPE: ICD-10-CM

## 2023-09-14 DIAGNOSIS — E11.9 DIABETES MELLITUS TYPE 2 WITHOUT RETINOPATHY (MULTI): Primary | ICD-10-CM

## 2023-09-14 DIAGNOSIS — F42.9 OBSESSIVE-COMPULSIVE DISORDER, UNSPECIFIED TYPE: ICD-10-CM

## 2023-09-14 DIAGNOSIS — E11.9 TYPE 2 DIABETES MELLITUS WITHOUT COMPLICATIONS (MULTI): ICD-10-CM

## 2023-09-14 PROCEDURE — 3078F DIAST BP <80 MM HG: CPT | Performed by: FAMILY MEDICINE

## 2023-09-14 PROCEDURE — 3074F SYST BP LT 130 MM HG: CPT | Performed by: FAMILY MEDICINE

## 2023-09-14 PROCEDURE — 3008F BODY MASS INDEX DOCD: CPT | Performed by: FAMILY MEDICINE

## 2023-09-14 PROCEDURE — 4010F ACE/ARB THERAPY RXD/TAKEN: CPT | Performed by: FAMILY MEDICINE

## 2023-09-14 PROCEDURE — 99213 OFFICE O/P EST LOW 20 MIN: CPT | Performed by: FAMILY MEDICINE

## 2023-09-14 PROCEDURE — 1036F TOBACCO NON-USER: CPT | Performed by: FAMILY MEDICINE

## 2023-09-14 RX ORDER — SEMAGLUTIDE 2.4 MG/.75ML
2.4 INJECTION, SOLUTION SUBCUTANEOUS
Qty: 9 ML | Refills: 0 | Status: SHIPPED | OUTPATIENT
Start: 2023-09-14 | End: 2023-11-21 | Stop reason: SDUPTHER

## 2023-09-14 RX ORDER — ATENOLOL 50 MG/1
50 TABLET ORAL
COMMUNITY
Start: 2023-08-21 | End: 2023-11-03 | Stop reason: WASHOUT

## 2023-09-14 RX ORDER — PIOGLITAZONEHYDROCHLORIDE 30 MG/1
30 TABLET ORAL DAILY
Qty: 90 TABLET | Refills: 1 | Status: SHIPPED | OUTPATIENT
Start: 2023-09-14 | End: 2024-02-22 | Stop reason: SDUPTHER

## 2023-09-14 RX ORDER — LOSARTAN POTASSIUM 50 MG/1
50 TABLET ORAL DAILY
Qty: 30 TABLET | Refills: 11 | Status: SHIPPED | OUTPATIENT
Start: 2023-09-14 | End: 2023-11-03 | Stop reason: SINTOL

## 2023-09-14 RX ORDER — ATORVASTATIN CALCIUM 40 MG/1
40 TABLET, FILM COATED ORAL NIGHTLY
Qty: 90 TABLET | Refills: 1 | Status: SHIPPED | OUTPATIENT
Start: 2023-09-14 | End: 2024-02-22 | Stop reason: SDUPTHER

## 2023-09-14 RX ORDER — FLUVOXAMINE MALEATE 25 MG/1
12.5 TABLET ORAL NIGHTLY
Qty: 45 TABLET | Refills: 1 | Status: SHIPPED | OUTPATIENT
Start: 2023-09-14 | End: 2024-02-22 | Stop reason: SDUPTHER

## 2023-09-14 ASSESSMENT — PATIENT HEALTH QUESTIONNAIRE - PHQ9
1. LITTLE INTEREST OR PLEASURE IN DOING THINGS: NOT AT ALL
SUM OF ALL RESPONSES TO PHQ9 QUESTIONS 1 AND 2: 0
2. FEELING DOWN, DEPRESSED OR HOPELESS: NOT AT ALL

## 2023-09-14 ASSESSMENT — ENCOUNTER SYMPTOMS: DEPRESSION: 0

## 2023-09-14 NOTE — PROGRESS NOTES
Subjective   Patient ID: [id number]     Bhavani Carr is a 48 y.o. female who presents for Follow-up (3 month) and Letter for housing (To put a bath bar in tub.).  HPI  She is here for a recheck.  She has diabetes.  She is on pioglitazone and Wegovy.      Her last A1C was 6.5 in December 2022.      She has lost 11 pounds since March 2023.      She feels like she is cold all the time.  Even through the summer.     She has fatigue from being cold all the time, she states.     No chest pain, no SOB.  Dizziness from POTS.      She sees Dr Shafer for tremors.  He started atenolol.      Objective     /68 (BP Location: Right arm, Patient Position: Sitting)   Temp 37.1 °C (98.7 °F)   Wt 75.8 kg (167 lb)   BMI 30.54 kg/m²      Physical Exam  Constitutional:       Appearance: Normal appearance.   Cardiovascular:      Rate and Rhythm: Normal rate and regular rhythm.      Heart sounds: Normal heart sounds.   Pulmonary:      Effort: Pulmonary effort is normal.      Breath sounds: Normal breath sounds.   Neurological:      General: No focal deficit present.      Mental Status: She is alert.   Psychiatric:         Mood and Affect: Mood normal.         Assessment/Plan   Problem List Items Addressed This Visit       Diabetes mellitus type 2 without retinopathy (CMS/HCC) - Primary    Relevant Medications    semaglutide, weight loss, (Wegovy) 2.4 mg/0.75 mL pen injector    pioglitazone (Actos) 30 mg tablet     Other Visit Diagnoses       POTS (postural orthostatic tachycardia syndrome)        Hypertension, unspecified type        Relevant Medications    losartan (Cozaar) 50 mg tablet    Type 2 diabetes mellitus without complications (CMS/HCC)        Relevant Medications    atorvastatin (Lipitor) 40 mg tablet    Obsessive-compulsive disorder, unspecified type        Relevant Medications    fLuvoxaMINE (Luvox) 25 mg tablet            Hossein Jimenez DO

## 2023-09-14 NOTE — LETTER
September 14, 2023     Patient: Bhavani Carr   YOB: 1975   Date of Visit: 9/14/2023       To Whom It May Concern:    Bhavani Carr is a patient at this office who suffers from postural orthostatic tachycardia syndrome.  She has frequent bouts of dizziness and is at high risk for falling.  It is medically necessary the she have a grab bar installed in her bathroom for safety reasons.       Sincerely,         Hossein Jimeenz DO        CC: No Recipients

## 2023-09-29 ENCOUNTER — LAB (OUTPATIENT)
Dept: LAB | Facility: LAB | Age: 48
End: 2023-09-29
Payer: MEDICARE

## 2023-09-29 DIAGNOSIS — G90.A POTS (POSTURAL ORTHOSTATIC TACHYCARDIA SYNDROME): ICD-10-CM

## 2023-09-29 DIAGNOSIS — E11.9 DIABETES MELLITUS TYPE 2 WITHOUT RETINOPATHY (MULTI): ICD-10-CM

## 2023-09-29 LAB
ALANINE AMINOTRANSFERASE (SGPT) (U/L) IN SER/PLAS: 15 U/L (ref 7–45)
ALBUMIN (G/DL) IN SER/PLAS: 4.7 G/DL (ref 3.4–5)
ALKALINE PHOSPHATASE (U/L) IN SER/PLAS: 67 U/L (ref 33–110)
ANION GAP IN SER/PLAS: 13 MMOL/L (ref 10–20)
ASPARTATE AMINOTRANSFERASE (SGOT) (U/L) IN SER/PLAS: 16 U/L (ref 9–39)
BASOPHILS (10*3/UL) IN BLOOD BY AUTOMATED COUNT: 0.07 X10E9/L (ref 0–0.1)
BASOPHILS/100 LEUKOCYTES IN BLOOD BY AUTOMATED COUNT: 1 % (ref 0–2)
BILIRUBIN TOTAL (MG/DL) IN SER/PLAS: 0.4 MG/DL (ref 0–1.2)
CALCIUM (MG/DL) IN SER/PLAS: 10 MG/DL (ref 8.6–10.3)
CARBON DIOXIDE, TOTAL (MMOL/L) IN SER/PLAS: 28 MMOL/L (ref 21–32)
CHLORIDE (MMOL/L) IN SER/PLAS: 100 MMOL/L (ref 98–107)
CHOLESTEROL (MG/DL) IN SER/PLAS: 123 MG/DL (ref 0–199)
CHOLESTEROL IN HDL (MG/DL) IN SER/PLAS: 40.8 MG/DL
CHOLESTEROL/HDL RATIO: 3
CREATININE (MG/DL) IN SER/PLAS: 1.07 MG/DL (ref 0.5–1.05)
EOSINOPHILS (10*3/UL) IN BLOOD BY AUTOMATED COUNT: 0.36 X10E9/L (ref 0–0.7)
EOSINOPHILS/100 LEUKOCYTES IN BLOOD BY AUTOMATED COUNT: 5 % (ref 0–6)
ERYTHROCYTE DISTRIBUTION WIDTH (RATIO) BY AUTOMATED COUNT: 11.7 % (ref 11.5–14.5)
ERYTHROCYTE MEAN CORPUSCULAR HEMOGLOBIN CONCENTRATION (G/DL) BY AUTOMATED: 31.6 G/DL (ref 32–36)
ERYTHROCYTE MEAN CORPUSCULAR VOLUME (FL) BY AUTOMATED COUNT: 101 FL (ref 80–100)
ERYTHROCYTES (10*6/UL) IN BLOOD BY AUTOMATED COUNT: 4.01 X10E12/L (ref 4–5.2)
ESTIMATED AVERAGE GLUCOSE FOR HBA1C: 117 MG/DL
GFR FEMALE: 64 ML/MIN/1.73M2
GLUCOSE (MG/DL) IN SER/PLAS: 97 MG/DL (ref 74–99)
HEMATOCRIT (%) IN BLOOD BY AUTOMATED COUNT: 40.5 % (ref 36–46)
HEMOGLOBIN (G/DL) IN BLOOD: 12.8 G/DL (ref 12–16)
HEMOGLOBIN A1C/HEMOGLOBIN TOTAL IN BLOOD: 5.7 %
IMMATURE GRANULOCYTES/100 LEUKOCYTES IN BLOOD BY AUTOMATED COUNT: 0.3 % (ref 0–0.9)
LDL: 54 MG/DL (ref 0–99)
LEUKOCYTES (10*3/UL) IN BLOOD BY AUTOMATED COUNT: 7.1 X10E9/L (ref 4.4–11.3)
LYMPHOCYTES (10*3/UL) IN BLOOD BY AUTOMATED COUNT: 3.38 X10E9/L (ref 1.2–4.8)
LYMPHOCYTES/100 LEUKOCYTES IN BLOOD BY AUTOMATED COUNT: 47.4 % (ref 13–44)
MONOCYTES (10*3/UL) IN BLOOD BY AUTOMATED COUNT: 0.56 X10E9/L (ref 0.1–1)
MONOCYTES/100 LEUKOCYTES IN BLOOD BY AUTOMATED COUNT: 7.9 % (ref 2–10)
NEUTROPHILS (10*3/UL) IN BLOOD BY AUTOMATED COUNT: 2.74 X10E9/L (ref 1.2–7.7)
NEUTROPHILS/100 LEUKOCYTES IN BLOOD BY AUTOMATED COUNT: 38.4 % (ref 40–80)
PLATELETS (10*3/UL) IN BLOOD AUTOMATED COUNT: 313 X10E9/L (ref 150–450)
POTASSIUM (MMOL/L) IN SER/PLAS: 4.1 MMOL/L (ref 3.5–5.3)
PROTEIN TOTAL: 7.7 G/DL (ref 6.4–8.2)
SODIUM (MMOL/L) IN SER/PLAS: 137 MMOL/L (ref 136–145)
THYROTROPIN (MIU/L) IN SER/PLAS BY DETECTION LIMIT <= 0.05 MIU/L: 1.51 MIU/L (ref 0.44–3.98)
TRIGLYCERIDE (MG/DL) IN SER/PLAS: 143 MG/DL (ref 0–149)
UREA NITROGEN (MG/DL) IN SER/PLAS: 11 MG/DL (ref 6–23)
VLDL: 29 MG/DL (ref 0–40)

## 2023-09-29 PROCEDURE — 80053 COMPREHEN METABOLIC PANEL: CPT

## 2023-09-29 PROCEDURE — 84443 ASSAY THYROID STIM HORMONE: CPT

## 2023-09-29 PROCEDURE — 36415 COLL VENOUS BLD VENIPUNCTURE: CPT

## 2023-09-29 PROCEDURE — 83036 HEMOGLOBIN GLYCOSYLATED A1C: CPT

## 2023-09-29 PROCEDURE — 85025 COMPLETE CBC W/AUTO DIFF WBC: CPT

## 2023-09-29 PROCEDURE — 80061 LIPID PANEL: CPT

## 2023-10-03 ENCOUNTER — TELEPHONE (OUTPATIENT)
Dept: PRIMARY CARE | Facility: CLINIC | Age: 48
End: 2023-10-03
Payer: MEDICARE

## 2023-10-03 LAB — OXCARB OR ESLICARB METABOLITE (MHD): 12 UG/ML (ref 3–35)

## 2023-10-03 NOTE — TELEPHONE ENCOUNTER
Hossein Jimenez, DO to [place] [id number] Clinical Support Staff    Please let her know her labs showed that her diabetes is well controlled.  There were no other significant abnormalities.

## 2023-10-30 ENCOUNTER — HOSPITAL ENCOUNTER (EMERGENCY)
Age: 48
Discharge: HOME | End: 2023-10-31
Attending: EMERGENCY MEDICINE
Payer: MEDICARE

## 2023-10-30 DIAGNOSIS — G90.A POTS (POSTURAL ORTHOSTATIC TACHYCARDIA SYNDROME): ICD-10-CM

## 2023-10-30 DIAGNOSIS — E87.1 HYPONATREMIA: ICD-10-CM

## 2023-10-30 DIAGNOSIS — R20.2 PARESTHESIA AND PAIN OF BOTH UPPER EXTREMITIES: Primary | ICD-10-CM

## 2023-10-30 DIAGNOSIS — E86.0 DEHYDRATION: ICD-10-CM

## 2023-10-30 DIAGNOSIS — M79.602 PARESTHESIA AND PAIN OF BOTH UPPER EXTREMITIES: Primary | ICD-10-CM

## 2023-10-30 DIAGNOSIS — M79.601 PARESTHESIA AND PAIN OF BOTH UPPER EXTREMITIES: Primary | ICD-10-CM

## 2023-10-30 LAB
GLUCOSE BLD-MCNC: 174 MG/DL (ref 70–99)
PERFORMED ON: ABNORMAL

## 2023-10-30 PROCEDURE — 2580000003 HC RX 258: Performed by: EMERGENCY MEDICINE

## 2023-10-30 PROCEDURE — 83735 ASSAY OF MAGNESIUM: CPT

## 2023-10-30 PROCEDURE — 93005 ELECTROCARDIOGRAM TRACING: CPT | Performed by: EMERGENCY MEDICINE

## 2023-10-30 PROCEDURE — 36415 COLL VENOUS BLD VENIPUNCTURE: CPT

## 2023-10-30 PROCEDURE — 96360 HYDRATION IV INFUSION INIT: CPT

## 2023-10-30 PROCEDURE — 81003 URINALYSIS AUTO W/O SCOPE: CPT

## 2023-10-30 PROCEDURE — 80053 COMPREHEN METABOLIC PANEL: CPT

## 2023-10-30 PROCEDURE — 99285 EMERGENCY DEPT VISIT HI MDM: CPT

## 2023-10-30 PROCEDURE — 84484 ASSAY OF TROPONIN QUANT: CPT

## 2023-10-30 PROCEDURE — 82077 ASSAY SPEC XCP UR&BREATH IA: CPT

## 2023-10-30 PROCEDURE — 84443 ASSAY THYROID STIM HORMONE: CPT

## 2023-10-30 RX ORDER — 0.9 % SODIUM CHLORIDE 0.9 %
1000 INTRAVENOUS SOLUTION INTRAVENOUS ONCE
Status: COMPLETED | OUTPATIENT
Start: 2023-10-30 | End: 2023-10-31

## 2023-10-30 RX ADMIN — SODIUM CHLORIDE 1000 ML: 9 INJECTION, SOLUTION INTRAVENOUS at 23:42

## 2023-10-30 ASSESSMENT — PAIN - FUNCTIONAL ASSESSMENT: PAIN_FUNCTIONAL_ASSESSMENT: NONE - DENIES PAIN

## 2023-10-30 ASSESSMENT — ENCOUNTER SYMPTOMS: CHEST TIGHTNESS: 0

## 2023-10-31 ENCOUNTER — APPOINTMENT (OUTPATIENT)
Dept: GENERAL RADIOLOGY | Age: 48
End: 2023-10-31
Payer: MEDICARE

## 2023-10-31 ENCOUNTER — APPOINTMENT (OUTPATIENT)
Dept: CT IMAGING | Age: 48
End: 2023-10-31
Payer: MEDICARE

## 2023-10-31 VITALS
RESPIRATION RATE: 11 BRPM | SYSTOLIC BLOOD PRESSURE: 96 MMHG | BODY MASS INDEX: 28.35 KG/M2 | OXYGEN SATURATION: 98 % | HEIGHT: 63 IN | HEART RATE: 63 BPM | WEIGHT: 160 LBS | TEMPERATURE: 98 F | DIASTOLIC BLOOD PRESSURE: 64 MMHG

## 2023-10-31 LAB
ALBUMIN SERPL-MCNC: 4.2 G/DL (ref 3.5–4.6)
ALP SERPL-CCNC: 75 U/L (ref 40–130)
ALT SERPL-CCNC: 25 U/L (ref 0–33)
AMPHET UR QL SCN: NORMAL
ANION GAP SERPL CALCULATED.3IONS-SCNC: 9 MEQ/L (ref 9–15)
AST SERPL-CCNC: 24 U/L (ref 0–35)
BARBITURATES UR QL SCN: NORMAL
BASOPHILS # BLD: 0.1 K/UL (ref 0–0.2)
BASOPHILS NFR BLD: 1 %
BENZODIAZ UR QL SCN: NORMAL
BILIRUB SERPL-MCNC: 0.3 MG/DL (ref 0.2–0.7)
BILIRUB UR QL STRIP: NEGATIVE
BUN SERPL-MCNC: 11 MG/DL (ref 6–20)
CALCIUM SERPL-MCNC: 9.1 MG/DL (ref 8.5–9.9)
CANNABINOIDS UR QL SCN: NORMAL
CHLORIDE SERPL-SCNC: 97 MEQ/L (ref 95–107)
CLARITY UR: CLEAR
CO2 SERPL-SCNC: 26 MEQ/L (ref 20–31)
COCAINE UR QL SCN: NORMAL
COLOR UR: YELLOW
CREAT SERPL-MCNC: 0.84 MG/DL (ref 0.5–0.9)
DRUG SCREEN COMMENT UR-IMP: NORMAL
EKG ATRIAL RATE: 51 BPM
EKG P AXIS: 1 DEGREES
EKG P-R INTERVAL: 142 MS
EKG Q-T INTERVAL: 476 MS
EKG QRS DURATION: 98 MS
EKG QTC CALCULATION (BAZETT): 438 MS
EKG R AXIS: 23 DEGREES
EKG T AXIS: 39 DEGREES
EKG VENTRICULAR RATE: 51 BPM
EOSINOPHIL # BLD: 0.3 K/UL (ref 0–0.7)
EOSINOPHIL NFR BLD: 5.4 %
ERYTHROCYTE [DISTWIDTH] IN BLOOD BY AUTOMATED COUNT: 11.9 % (ref 11.5–14.5)
ETHANOL PERCENT: NORMAL G/DL
ETHANOLAMINE SERPL-MCNC: <10 MG/DL (ref 0–0.08)
FENTANYL SCREEN, URINE: NORMAL
GLOBULIN SER CALC-MCNC: 2.7 G/DL (ref 2.3–3.5)
GLUCOSE SERPL-MCNC: 172 MG/DL (ref 70–99)
GLUCOSE UR STRIP-MCNC: NEGATIVE MG/DL
HCT VFR BLD AUTO: 35.6 % (ref 37–47)
HGB BLD-MCNC: 11.6 G/DL (ref 12–16)
HGB UR QL STRIP: NEGATIVE
KETONES UR STRIP-MCNC: NEGATIVE MG/DL
LEUKOCYTE ESTERASE UR QL STRIP: NEGATIVE
LYMPHOCYTES # BLD: 2.3 K/UL (ref 1–4.8)
LYMPHOCYTES NFR BLD: 37 %
MAGNESIUM SERPL-MCNC: 1.9 MG/DL (ref 1.7–2.4)
MCH RBC QN AUTO: 31 PG (ref 27–31.3)
MCHC RBC AUTO-ENTMCNC: 32.6 % (ref 33–37)
MCV RBC AUTO: 95.2 FL (ref 79.4–94.8)
METHADONE UR QL SCN: NORMAL
MONOCYTES # BLD: 0.7 K/UL (ref 0.2–0.8)
MONOCYTES NFR BLD: 11.2 %
NEUTROPHILS # BLD: 2.8 K/UL (ref 1.4–6.5)
NEUTS SEG NFR BLD: 45.2 %
NITRITE UR QL STRIP: NEGATIVE
OPIATES UR QL SCN: NORMAL
OXYCODONE UR QL SCN: NORMAL
PCP UR QL SCN: NORMAL
PH UR STRIP: 7 [PH] (ref 5–9)
PLATELET # BLD AUTO: 311 K/UL (ref 130–400)
POTASSIUM SERPL-SCNC: 3.9 MEQ/L (ref 3.4–4.9)
PROPOXYPH UR QL SCN: NORMAL
PROT SERPL-MCNC: 6.9 G/DL (ref 6.3–8)
PROT UR STRIP-MCNC: NEGATIVE MG/DL
RBC # BLD AUTO: 3.74 M/UL (ref 4.2–5.4)
SODIUM SERPL-SCNC: 132 MEQ/L (ref 135–144)
SP GR UR STRIP: 1.01 (ref 1–1.03)
TROPONIN, HIGH SENSITIVITY: <6 NG/L (ref 0–19)
TSH SERPL-MCNC: 3.21 UIU/ML (ref 0.44–3.86)
UROBILINOGEN UR STRIP-ACNC: 0.2 E.U./DL
WBC # BLD AUTO: 6.1 K/UL (ref 4.8–10.8)

## 2023-10-31 PROCEDURE — 71046 X-RAY EXAM CHEST 2 VIEWS: CPT

## 2023-10-31 PROCEDURE — 70450 CT HEAD/BRAIN W/O DYE: CPT

## 2023-10-31 PROCEDURE — 85025 COMPLETE CBC W/AUTO DIFF WBC: CPT

## 2023-10-31 PROCEDURE — 80307 DRUG TEST PRSMV CHEM ANLYZR: CPT

## 2023-10-31 PROCEDURE — 96361 HYDRATE IV INFUSION ADD-ON: CPT

## 2023-10-31 ASSESSMENT — PAIN - FUNCTIONAL ASSESSMENT: PAIN_FUNCTIONAL_ASSESSMENT: NONE - DENIES PAIN

## 2023-10-31 NOTE — ED PROVIDER NOTES
[facility] ED  EMERGENCY DEPARTMENT ENCOUNTER      Pt Name: Brielle Falcon  MRN: [id number]  [id number] [facility] 1975  Date of evaluation: 10/30/2023  Provider: Wil Arreaga MD    [address]       Chief Complaint   Patient presents with    Extremity Weakness     Pt c/o bilateral arm weakness, facial tingling x 1 hr. States it feels like when her spinal stimulator is on. HISTORY OF PRESENT ILLNESS   (Location/Symptom, Timing/Onset, Context/Setting, Quality, Duration, Modifying Factors, Severity)  Note limiting factors. Brielle Falcon is a 50 y.o. female who presents to the emergency department complaining of tingling in bilateral arms and numbness. She states that approximately 30 minutes prior to arrival she developed a sensation in both of her arms that feels like her spinal stimulator activated that radiates up to her face. She denies significant weakness. She denies unilateral symptoms. She denies nausea or fall    HPI    Nursing Notes were reviewed. REVIEW OF SYSTEMS    (2-9 systems for level 4, 10 or more for level 5)     Review of Systems   Respiratory:  Negative for chest tightness. Cardiovascular:  Negative for chest pain. Neurological:  Positive for numbness. Except as noted above the remainder of the review of systems was reviewed and negative.        PAST MEDICAL HISTORY     Past Medical History:   Diagnosis Date    Anxiety     Chronic back pain     Chronic neck pain     Depression     Diabetes mellitus ([address])     Hyperlipidemia     Hypertension     Osteoarthritis     Panic attack          SURGICAL HISTORY       Past Surgical History:   Procedure Laterality Date    ANKLE SURGERY Right     ANUS SURGERY      anal sphincter reconstruction    CYST REMOVAL Left     KNEE SURGERY Right     NERVE SURGERY N/A 12/24/2020    REPLACEMENT OF SPINAL CORD STIMULATOR GENERATOR performed by Danae Hoover MD at [address]      spinal stimulator    OVARIAN CYST

## 2023-11-03 ENCOUNTER — OFFICE VISIT (OUTPATIENT)
Dept: PRIMARY CARE | Facility: CLINIC | Age: 48
End: 2023-11-03
Payer: MEDICARE

## 2023-11-03 VITALS
TEMPERATURE: 97.8 F | WEIGHT: 160 LBS | SYSTOLIC BLOOD PRESSURE: 65 MMHG | BODY MASS INDEX: 29.26 KG/M2 | DIASTOLIC BLOOD PRESSURE: 45 MMHG

## 2023-11-03 DIAGNOSIS — I95.0 IDIOPATHIC HYPOTENSION: Primary | ICD-10-CM

## 2023-11-03 DIAGNOSIS — E11.9 TYPE 2 DIABETES MELLITUS WITHOUT COMPLICATION, WITHOUT LONG-TERM CURRENT USE OF INSULIN (MULTI): ICD-10-CM

## 2023-11-03 PROCEDURE — 1036F TOBACCO NON-USER: CPT | Performed by: FAMILY MEDICINE

## 2023-11-03 PROCEDURE — 3008F BODY MASS INDEX DOCD: CPT | Performed by: FAMILY MEDICINE

## 2023-11-03 PROCEDURE — 3044F HG A1C LEVEL LT 7.0%: CPT | Performed by: FAMILY MEDICINE

## 2023-11-03 PROCEDURE — 99214 OFFICE O/P EST MOD 30 MIN: CPT | Performed by: FAMILY MEDICINE

## 2023-11-03 PROCEDURE — 3074F SYST BP LT 130 MM HG: CPT | Performed by: FAMILY MEDICINE

## 2023-11-03 PROCEDURE — 3078F DIAST BP <80 MM HG: CPT | Performed by: FAMILY MEDICINE

## 2023-11-03 NOTE — PATIENT INSTRUCTIONS
I recommend drinking plenty of fluids.  Do not restrict sodium for now.  I recommend staying off the atenolol as you have been.  I recommend stopping the losartan and the tizanidine.  Both of these can cause dizziness and lower the blood pressure.  Return to the office Monday or Tuesday of next week.  You state you cannot get a ride Monday so you will come Tuesday.  Go to the ER of you feel any worse, especially with your dizziness.  Your blood pressure is very low but on exam you seem to be stable.

## 2023-11-03 NOTE — PROGRESS NOTES
Subjective   Patient ID: [id number]     Bhavani Carr is a 48 y.o. female who presents for Hospital Follow-up.  HPI  She was seen in the ER on 10/30.  She was discharged from the ER.  She went in complaining of paresthesias in the face and both arms.  She was found to have low blood pressure.  They gave her IV fluids.  She feels dizzy.    She states the tingling in the face and arms went away. She still feels dizzy.  Danya states she stumbled and almost fell walking to the exam room.      No falls (but almost fell in our office).  No headache.  No chest pain.  No SOB.  She feels dizzy and tired.    She denies any muscle pain at this time.  She does have neck pain.  Rated 3/10.    She states Dr Shafer took her off atenolol on 10/2/23 due to POTS and he referred her to a cardiologist, Dr Simmons.      She is also on losartan, which she takes for renoprotection for diabetes.    She last checked her blood glucose at 108 yesterday morning.  Late in the day at is 170.      Denies fever, chills.  No cough, chest pain.  No SOB.  No abdominal pain, nausea, vomiting, diarrhea, constipation.  No blood in stool.  No burning with urination.  No headache.      She had a CT brain and chest xray that were normal.      Objective     BP (!) 65/45   Temp 36.6 °C (97.8 °F) (Skin)   Wt 72.6 kg (160 lb) Comment: per patient.  BMI 29.26 kg/m²      Physical Exam  Constitutional:       General: She is not in acute distress.     Appearance: Normal appearance. She is not toxic-appearing or diaphoretic.   Cardiovascular:      Rate and Rhythm: Normal rate and regular rhythm.      Heart sounds: No murmur heard.  Pulmonary:      Effort: Pulmonary effort is normal. No respiratory distress.      Breath sounds: Normal breath sounds.   Neurological:      General: No focal deficit present.      Mental Status: She is alert and oriented to person, place, and time. Mental status is at baseline.      Cranial Nerves: No cranial nerve deficit.      Comments:  Speech clear and fluent.  Pt is alert. Appears in no distress.           Assessment/Plan   Problem List Items Addressed This Visit    None  Visit Diagnoses       Idiopathic hypotension    -  Primary    Type 2 diabetes mellitus without complication, without long-term current use of insulin (CMS/[facility])            I recommend drinking plenty of fluids.  Do not restrict sodium for now.  I recommend staying off the atenolol as you have been.  I recommend stopping the losartan and the tizanidine.  Both of these can cause dizziness and lower the blood pressure.  Return to the office Monday or Tuesday of next week.  You state you cannot get a ride Monday so you will come Tuesday.  Go to the ER of you feel any worse, especially with your dizziness.  Your blood pressure is very low but on exam you seem to be stable.      Hossein Jimenez, DO

## 2023-11-07 ENCOUNTER — OFFICE VISIT (OUTPATIENT)
Dept: PRIMARY CARE | Facility: CLINIC | Age: 48
End: 2023-11-07
Payer: MEDICARE

## 2023-11-07 VITALS — DIASTOLIC BLOOD PRESSURE: 62 MMHG | WEIGHT: 160 LBS | BODY MASS INDEX: 29.26 KG/M2 | SYSTOLIC BLOOD PRESSURE: 114 MMHG

## 2023-11-07 DIAGNOSIS — G90.A POTS (POSTURAL ORTHOSTATIC TACHYCARDIA SYNDROME): Primary | ICD-10-CM

## 2023-11-07 PROCEDURE — 3008F BODY MASS INDEX DOCD: CPT | Performed by: FAMILY MEDICINE

## 2023-11-07 PROCEDURE — 1036F TOBACCO NON-USER: CPT | Performed by: FAMILY MEDICINE

## 2023-11-07 PROCEDURE — 3074F SYST BP LT 130 MM HG: CPT | Performed by: FAMILY MEDICINE

## 2023-11-07 PROCEDURE — 3078F DIAST BP <80 MM HG: CPT | Performed by: FAMILY MEDICINE

## 2023-11-07 PROCEDURE — 99213 OFFICE O/P EST LOW 20 MIN: CPT | Performed by: FAMILY MEDICINE

## 2023-11-07 PROCEDURE — 3044F HG A1C LEVEL LT 7.0%: CPT | Performed by: FAMILY MEDICINE

## 2023-11-07 RX ORDER — MIDODRINE HYDROCHLORIDE 2.5 MG/1
2.5 TABLET ORAL 3 TIMES DAILY
Qty: 90 TABLET | Refills: 0 | Status: SHIPPED | OUTPATIENT
Start: 2023-11-07 | End: 2023-11-21 | Stop reason: SDUPTHER

## 2023-11-07 NOTE — PATIENT INSTRUCTIONS
I will prescribe midodrine to try to help your POTS.  Your blood pressure is overall better than it was last week.  Return in two weeks for a recheck on POTS and your blood pressure.

## 2023-11-07 NOTE — PROGRESS NOTES
Subjective   Patient ID: [id number]     Bhavani Carr is a 48 y.o. female who presents for Follow-up (Follow up BP).  HPI  She is here for a recheck on  her blood pressure.  Her pressure was very low when she was in last week.  I stopped her losartan and tizanidine.  She had already been told to stop her atenolol by other doctors and had.  She was told to drink plenty of fluids and to not restrict sodium in the diet.      Her blood pressure was 60s/40s.  It has improved today to 114/62.      She has been off the atenolol and losartan.  She now takes the tizanidine only at night when she goes to bed.  She states she needs it for neck pain.    She had some dizziness this morning when showering and went standing up out of bed.  Her blood pressure then was in the 80s.      Overall, she is feeling better than she did last week.      She states she has POTS confirmed by tilt table test in 2017.  She states it has  been getting worse lately.                Objective     /62   Wt 72.6 kg (160 lb)   BMI 29.26 kg/m²      Physical Exam  Constitutional:       Appearance: Normal appearance.   Cardiovascular:      Rate and Rhythm: Normal rate and regular rhythm.      Heart sounds: Normal heart sounds.   Pulmonary:      Effort: Pulmonary effort is normal.      Breath sounds: Normal breath sounds.   Neurological:      General: No focal deficit present.      Mental Status: She is alert and oriented to person, place, and time.         Assessment/Plan   Problem List Items Addressed This Visit    None  Visit Diagnoses       POTS (postural orthostatic tachycardia syndrome)    -  Primary    Relevant Medications    midodrine (Proamatine) 2.5 mg tablet          I will prescribe midodrine to try to help your POTS.  Your blood pressure is overall better than it was last week.  Return in two weeks for a recheck on POTS and your blood pressure.    Hossein Jimenez, DO

## 2023-11-11 ENCOUNTER — HOSPITAL ENCOUNTER (EMERGENCY)
Age: 48
Discharge: HOME | End: 2023-11-11
Payer: MEDICARE

## 2023-11-11 ENCOUNTER — APPOINTMENT (OUTPATIENT)
Dept: GENERAL RADIOLOGY | Age: 48
End: 2023-11-11
Payer: MEDICARE

## 2023-11-11 VITALS
TEMPERATURE: 98.4 F | RESPIRATION RATE: 21 BRPM | DIASTOLIC BLOOD PRESSURE: 61 MMHG | HEART RATE: 93 BPM | BODY MASS INDEX: 28.34 KG/M2 | SYSTOLIC BLOOD PRESSURE: 107 MMHG | WEIGHT: 160 LBS | OXYGEN SATURATION: 100 %

## 2023-11-11 DIAGNOSIS — G90.A POTS (POSTURAL ORTHOSTATIC TACHYCARDIA SYNDROME): Primary | ICD-10-CM

## 2023-11-11 DIAGNOSIS — R42 DIZZINESS: ICD-10-CM

## 2023-11-11 LAB
ALBUMIN SERPL-MCNC: 4.2 G/DL (ref 3.5–4.6)
ALP SERPL-CCNC: 71 U/L (ref 40–130)
ALT SERPL-CCNC: 20 U/L (ref 0–33)
ANION GAP SERPL CALCULATED.3IONS-SCNC: 9 MEQ/L (ref 9–15)
AST SERPL-CCNC: 23 U/L (ref 0–35)
BASOPHILS # BLD: 0.1 K/UL (ref 0–0.2)
BASOPHILS NFR BLD: 0.7 %
BILIRUB SERPL-MCNC: 0.3 MG/DL (ref 0.2–0.7)
BILIRUB UR QL STRIP: NEGATIVE
BUN SERPL-MCNC: 7 MG/DL (ref 6–20)
CALCIUM SERPL-MCNC: 9 MG/DL (ref 8.5–9.9)
CHLORIDE SERPL-SCNC: 100 MEQ/L (ref 95–107)
CK SERPL-CCNC: 44 U/L (ref 0–170)
CLARITY UR: CLEAR
CO2 SERPL-SCNC: 28 MEQ/L (ref 20–31)
COLOR UR: YELLOW
CREAT SERPL-MCNC: 0.65 MG/DL (ref 0.5–0.9)
EKG ATRIAL RATE: 51 BPM
EKG P AXIS: -4 DEGREES
EKG P-R INTERVAL: 138 MS
EKG Q-T INTERVAL: 478 MS
EKG QRS DURATION: 94 MS
EKG QTC CALCULATION (BAZETT): 440 MS
EKG R AXIS: 38 DEGREES
EKG T AXIS: 35 DEGREES
EKG VENTRICULAR RATE: 51 BPM
EOSINOPHIL # BLD: 0.4 K/UL (ref 0–0.7)
EOSINOPHIL NFR BLD: 5.1 %
ERYTHROCYTE [DISTWIDTH] IN BLOOD BY AUTOMATED COUNT: 12.4 % (ref 11.5–14.5)
GLOBULIN SER CALC-MCNC: 3 G/DL (ref 2.3–3.5)
GLUCOSE SERPL-MCNC: 88 MG/DL (ref 70–99)
GLUCOSE UR STRIP-MCNC: 500 MG/DL
HCT VFR BLD AUTO: 38.6 % (ref 37–47)
HGB BLD-MCNC: 12.6 G/DL (ref 12–16)
HGB UR QL STRIP: NEGATIVE
KETONES UR STRIP-MCNC: NEGATIVE MG/DL
LEUKOCYTE ESTERASE UR QL STRIP: NEGATIVE
LYMPHOCYTES # BLD: 2.7 K/UL (ref 1–4.8)
LYMPHOCYTES NFR BLD: 39.2 %
MAGNESIUM SERPL-MCNC: 2.1 MG/DL (ref 1.7–2.4)
MCH RBC QN AUTO: 31.4 PG (ref 27–31.3)
MCHC RBC AUTO-ENTMCNC: 32.6 % (ref 33–37)
MCV RBC AUTO: 96.3 FL (ref 79.4–94.8)
MONOCYTES # BLD: 0.5 K/UL (ref 0.2–0.8)
MONOCYTES NFR BLD: 7.6 %
NEUTROPHILS # BLD: 3.2 K/UL (ref 1.4–6.5)
NEUTS SEG NFR BLD: 47.1 %
NITRITE UR QL STRIP: NEGATIVE
PH UR STRIP: 6.5 [PH] (ref 5–9)
PLATELET # BLD AUTO: 340 K/UL (ref 130–400)
POTASSIUM SERPL-SCNC: 4.3 MEQ/L (ref 3.4–4.9)
PROT SERPL-MCNC: 7.2 G/DL (ref 6.3–8)
PROT UR STRIP-MCNC: NEGATIVE MG/DL
RBC # BLD AUTO: 4.01 M/UL (ref 4.2–5.4)
SODIUM SERPL-SCNC: 137 MEQ/L (ref 135–144)
SP GR UR STRIP: 1.02 (ref 1–1.03)
TROPONIN, HIGH SENSITIVITY: <6 NG/L (ref 0–19)
TROPONIN, HIGH SENSITIVITY: <6 NG/L (ref 0–19)
URINE REFLEX TO CULTURE: ABNORMAL
UROBILINOGEN UR STRIP-ACNC: 0.2 E.U./DL
WBC # BLD AUTO: 6.8 K/UL (ref 4.8–10.8)

## 2023-11-11 PROCEDURE — 83735 ASSAY OF MAGNESIUM: CPT

## 2023-11-11 PROCEDURE — 82550 ASSAY OF CK (CPK): CPT

## 2023-11-11 PROCEDURE — 96361 HYDRATE IV INFUSION ADD-ON: CPT

## 2023-11-11 PROCEDURE — 36415 COLL VENOUS BLD VENIPUNCTURE: CPT

## 2023-11-11 PROCEDURE — 71045 X-RAY EXAM CHEST 1 VIEW: CPT

## 2023-11-11 PROCEDURE — 93005 ELECTROCARDIOGRAM TRACING: CPT | Performed by: PHYSICIAN ASSISTANT

## 2023-11-11 PROCEDURE — 2580000003 HC RX 258: Performed by: PHYSICIAN ASSISTANT

## 2023-11-11 PROCEDURE — 81003 URINALYSIS AUTO W/O SCOPE: CPT

## 2023-11-11 PROCEDURE — 80053 COMPREHEN METABOLIC PANEL: CPT

## 2023-11-11 PROCEDURE — 85025 COMPLETE CBC W/AUTO DIFF WBC: CPT

## 2023-11-11 PROCEDURE — 99285 EMERGENCY DEPT VISIT HI MDM: CPT

## 2023-11-11 PROCEDURE — 96360 HYDRATION IV INFUSION INIT: CPT

## 2023-11-11 PROCEDURE — 84484 ASSAY OF TROPONIN QUANT: CPT

## 2023-11-11 RX ORDER — 0.9 % SODIUM CHLORIDE 0.9 %
1000 INTRAVENOUS SOLUTION INTRAVENOUS ONCE
Status: COMPLETED | OUTPATIENT
Start: 2023-11-11 | End: 2023-11-11

## 2023-11-11 RX ADMIN — SODIUM CHLORIDE 1000 ML: 9 INJECTION, SOLUTION INTRAVENOUS at 10:37

## 2023-11-11 RX ADMIN — SODIUM CHLORIDE 1000 ML: 9 INJECTION, SOLUTION INTRAVENOUS at 12:22

## 2023-11-11 ASSESSMENT — ENCOUNTER SYMPTOMS
ABDOMINAL DISTENTION: 0
ABDOMINAL PAIN: 0
SORE THROAT: 0
RHINORRHEA: 0
EYE DISCHARGE: 0
COLOR CHANGE: 0
CONSTIPATION: 0
SHORTNESS OF BREATH: 0

## 2023-11-11 ASSESSMENT — LIFESTYLE VARIABLES
HOW OFTEN DO YOU HAVE A DRINK CONTAINING ALCOHOL: NEVER
HOW MANY STANDARD DRINKS CONTAINING ALCOHOL DO YOU HAVE ON A TYPICAL DAY: PATIENT DOES NOT DRINK

## 2023-11-11 NOTE — ED NOTES
Pt states got up from using the bathroom and became dizzy. States generally weak. Pt states hx of Anthony.      Concetta Lazaro  11/11/23 [id number]

## 2023-11-11 NOTE — ED PROVIDER NOTES
[facility] ED  eMERGENCY dEPARTMENT eNCOUnter      Pt Name: Tom Quiroz  MRN: [id number]  [address] [facility] [place] 1975  Date of evaluation: 11/11/2023  Provider: Supriya Denis PA-C    CHIEF COMPLAINT       Chief Complaint   Patient presents with    Dizziness     Muscle weakness         HISTORY OF PRESENT ILLNESS   (Location/Symptom, Timing/Onset,Context/Setting, Quality, Duration, Modifying Factors, Severity)  Note limiting factors. Tom Quiroz is a 50 y.o. female who presents to the emergency department with complaint of dizziness, muscle weakness. Patient states that when she got up to use the restroom this morning, she felt as if she was going to pass out. Patient states has had similar episodes before in the past, was seen in the ED approximately 1 week ago for similar episode. She does have past history of syndrome, states she does not intermittently, and frequently have dizziness, and near syncope. She states she was just recently taken off of her losartan due to low blood pressure this was done approximately 3 days ago. Past medical history significant for chronic back pain, hyperlipidemia, depression, anxiety, osteoarthritis, diabetes, hypertension, panic attacks, POTS syndrome. HPI    NursingNotes were reviewed. REVIEW OF SYSTEMS    (2-9 systems for level 4, 10 or more for level 5)     Review of Systems   Constitutional:  Positive for fatigue. Negative for activity change and appetite change. HENT:  Negative for congestion, ear discharge, ear pain, nosebleeds, rhinorrhea and sore throat. Eyes:  Negative for discharge. Respiratory:  Negative for shortness of breath. Cardiovascular:  Negative for chest pain, palpitations and leg swelling. Gastrointestinal:  Negative for abdominal distention, abdominal pain and constipation. Genitourinary:  Negative for difficulty urinating and dysuria. Musculoskeletal:  Negative for arthralgias. Skin:  Negative for color change. Coordination: Coordination normal.   Psychiatric:         Mood and Affect: Mood normal.         RESULTS     EKG: All EKG's are interpreted by the Emergency Department Physician who either signs or Co-signsthis chart in the absence of a cardiologist.    EKG shows sinus bradycardia 51 bpm T wave inversions in V1 V2 V3 V4 V5 no ventricular ectopy QTc 440 ms, new T wave inversions in leads V4 and V5 which were not previous EKG of 10/30/2023    RADIOLOGY:   Non-plain filmimages such as CT, Ultrasound and MRI are read by the radiologist. Plain radiographic images are visualized and preliminarily interpreted by the emergency physician with the below findings:    Interpretation per the Radiologist below, if available at the time ofthis note:    XR CHEST PORTABLE   Final Result   No acute process               ED BEDSIDE ULTRASOUND:   Performed by ED Physician - none    LABS:  Labs Reviewed   CBC WITH AUTO DIFFERENTIAL - Abnormal; Notable for the following components:       Result Value    RBC 4.01 (*)     MCV 96.3 (*)     MCH 31.4 (*)     MCHC 32.6 (*)     All other components within normal limits   URINALYSIS WITH REFLEX TO CULTURE - Abnormal; Notable for the following components:    Glucose, Ur 500 (*)     All other components within normal limits   COMPREHENSIVE METABOLIC PANEL   CK   MAGNESIUM   TROPONIN   TROPONIN       All other labs were within normal range or not returned as of this dictation.     EMERGENCY DEPARTMENT COURSE and DIFFERENTIAL DIAGNOSIS/MDM:   Vitals:    Vitals:    11/11/23 1410 11/11/23 1420 11/11/23 1430 11/11/23 1440   BP:   107/61    Pulse: 67 66 71 93   Resp: 16 30 15 21   Temp:       TempSrc:       SpO2:       Weight:                Medical Decision Making  Patient presented to the emergency department complaint of dizziness, muscle weakness, he states this started when she got up this morning to use the restroom, patient states she does have past history of pots syndrome, with multiple similar

## 2023-11-13 LAB
EKG ATRIAL RATE: 51 BPM
EKG P AXIS: -4 DEGREES
EKG P-R INTERVAL: 138 MS
EKG Q-T INTERVAL: 478 MS
EKG QRS DURATION: 94 MS
EKG QTC CALCULATION (BAZETT): 440 MS
EKG R AXIS: 38 DEGREES
EKG T AXIS: 35 DEGREES
EKG VENTRICULAR RATE: 51 BPM

## 2023-11-13 PROCEDURE — 93010 ELECTROCARDIOGRAM REPORT: CPT | Performed by: INTERNAL MEDICINE

## 2023-11-21 ENCOUNTER — OFFICE VISIT (OUTPATIENT)
Dept: PRIMARY CARE | Facility: CLINIC | Age: 48
End: 2023-11-21
Payer: MEDICARE

## 2023-11-21 VITALS
SYSTOLIC BLOOD PRESSURE: 114 MMHG | DIASTOLIC BLOOD PRESSURE: 70 MMHG | TEMPERATURE: 97.7 F | WEIGHT: 163 LBS | BODY MASS INDEX: 29.81 KG/M2

## 2023-11-21 DIAGNOSIS — E11.9 DIABETES MELLITUS TYPE 2 WITHOUT RETINOPATHY (MULTI): ICD-10-CM

## 2023-11-21 DIAGNOSIS — G90.A POTS (POSTURAL ORTHOSTATIC TACHYCARDIA SYNDROME): Primary | ICD-10-CM

## 2023-11-21 DIAGNOSIS — E11.40 TYPE 2 DIABETES MELLITUS WITH DIABETIC NEUROPATHY, WITHOUT LONG-TERM CURRENT USE OF INSULIN (MULTI): ICD-10-CM

## 2023-11-21 DIAGNOSIS — E66.3 OVERWEIGHT (BMI 25.0-29.9): ICD-10-CM

## 2023-11-21 DIAGNOSIS — E11.9 TYPE 2 DIABETES MELLITUS WITHOUT COMPLICATION, WITHOUT LONG-TERM CURRENT USE OF INSULIN (MULTI): ICD-10-CM

## 2023-11-21 PROBLEM — E66.01 MORBID (SEVERE) OBESITY DUE TO EXCESS CALORIES (MULTI): Status: ACTIVE | Noted: 2023-02-02

## 2023-11-21 PROBLEM — E66.01 MORBID (SEVERE) OBESITY DUE TO EXCESS CALORIES (MULTI): Status: RESOLVED | Noted: 2023-02-02 | Resolved: 2023-11-21

## 2023-11-21 PROCEDURE — 3074F SYST BP LT 130 MM HG: CPT | Performed by: FAMILY MEDICINE

## 2023-11-21 PROCEDURE — 3078F DIAST BP <80 MM HG: CPT | Performed by: FAMILY MEDICINE

## 2023-11-21 PROCEDURE — 1036F TOBACCO NON-USER: CPT | Performed by: FAMILY MEDICINE

## 2023-11-21 PROCEDURE — 3044F HG A1C LEVEL LT 7.0%: CPT | Performed by: FAMILY MEDICINE

## 2023-11-21 PROCEDURE — 99214 OFFICE O/P EST MOD 30 MIN: CPT | Performed by: FAMILY MEDICINE

## 2023-11-21 PROCEDURE — 3008F BODY MASS INDEX DOCD: CPT | Performed by: FAMILY MEDICINE

## 2023-11-21 RX ORDER — MIDODRINE HYDROCHLORIDE 2.5 MG/1
2.5 TABLET ORAL 3 TIMES DAILY
Qty: 270 TABLET | Refills: 1 | Status: SHIPPED | OUTPATIENT
Start: 2023-11-21 | End: 2024-02-22 | Stop reason: SDUPTHER

## 2023-11-21 RX ORDER — SEMAGLUTIDE 2.4 MG/.75ML
2.4 INJECTION, SOLUTION SUBCUTANEOUS
Qty: 9 ML | Refills: 1 | Status: SHIPPED | OUTPATIENT
Start: 2023-11-21 | End: 2023-12-04 | Stop reason: SDUPTHER

## 2023-11-21 NOTE — PROGRESS NOTES
"Subjective   Patient ID: [id number]     Bhavani Carr is a 48 y.o. female who presents for Follow-up (2 week).  HPI  She is here for a two week recheck. She was seen then for POTS and started on midodrine.  BP had improved from 65/45 the visit before last.      \"I feel so much better.\"  The Midodrine helps a lot.  Only a couple of dizzy spells.  \"I dare say that I am almost ready to go back to the gym.\"    She checks her blood pressure at home. Normal results at home.  No side effects to the midodrine.     She states she is no longer \"freezing\".      She is requesting a refill of the Ozempic.     She is hoping to go back to the gym to bike and workout in the pool.  She has not actually fainted.  They have life guards at the pool.      Requesting a refill of Wegovy.  No side effects.  No nausea.    Objective     /70 (BP Location: Right arm, Patient Position: Sitting)   Temp 36.5 °C (97.7 °F) (Skin)   Wt 73.9 kg (163 lb)   BMI 29.81 kg/m²      Physical Exam  Cardiovascular:      Rate and Rhythm: Normal rate and regular rhythm.      Heart sounds: Normal heart sounds. No murmur heard.  Pulmonary:      Effort: Pulmonary effort is normal. No respiratory distress.      Breath sounds: Normal breath sounds. No wheezing.   Neurological:      General: No focal deficit present.      Mental Status: She is alert and oriented to person, place, and time. Mental status is at baseline.      Coordination: Coordination normal.      Gait: Gait normal.         Assessment/Plan   Problem List Items Addressed This Visit       RESOLVED: Morbid (severe) obesity due to excess calories (CMS/HCC)    Diabetes mellitus type 2 without retinopathy (CMS/[facility])    Relevant Medications    semaglutide, weight loss, (Wegovy) 2.4 mg/0.75 mL pen injector    Type 2 diabetes mellitus with diabetic neuropathy, without long-term current use of insulin (CMS/[facility])     Other Visit Diagnoses       POTS (postural orthostatic tachycardia syndrome)    -  Primary "    Relevant Medications    midodrine (Proamatine) 2.5 mg tablet    Type 2 diabetes mellitus without complication, without long-term current use of insulin (CMS/[facility])        Relevant Orders    Comprehensive metabolic panel    Hemoglobin A1c        It is good to see that you are feeling a lot better. Return in three months.  Stay on the midodrine.  I ordered labs to be done in three months prior to the appointment.    Hossein Jimenez, DO

## 2023-11-21 NOTE — PATIENT INSTRUCTIONS
It is good to see that you are feeling a lot better. Return in three months.  Stay on the midodrine.  I ordered labs to be done in three months prior to the appointment.

## 2023-12-04 DIAGNOSIS — E11.9 DIABETES MELLITUS TYPE 2 WITHOUT RETINOPATHY (MULTI): ICD-10-CM

## 2023-12-04 RX ORDER — SEMAGLUTIDE 2.4 MG/.75ML
2.4 INJECTION, SOLUTION SUBCUTANEOUS
Qty: 9 ML | Refills: 1 | Status: SHIPPED | OUTPATIENT
Start: 2023-12-04 | End: 2023-12-26 | Stop reason: CLARIF

## 2023-12-12 ENCOUNTER — TELEPHONE (OUTPATIENT)
Dept: PRIMARY CARE | Facility: CLINIC | Age: 48
End: 2023-12-12
Payer: MEDICARE

## 2023-12-12 DIAGNOSIS — F42.9 OBSESSIVE-COMPULSIVE DISORDER, UNSPECIFIED TYPE: Primary | ICD-10-CM

## 2023-12-12 NOTE — TELEPHONE ENCOUNTER
She says she needs a referral for continued care for Dr Richard Brunner. She has [employer] insurance. Dr Brunner's # is [phone].

## 2023-12-14 ENCOUNTER — APPOINTMENT (OUTPATIENT)
Dept: PRIMARY CARE | Facility: CLINIC | Age: 48
End: 2023-12-14
Payer: MEDICARE

## 2023-12-14 DIAGNOSIS — R46.81 OBSESSIVE-COMPULSIVE BEHAVIOR: Primary | ICD-10-CM

## 2023-12-14 DIAGNOSIS — F41.0 PANIC ANXIETY SYNDROME: ICD-10-CM

## 2023-12-14 NOTE — TELEPHONE ENCOUNTER
She has been seeing him for years and she says that she needs a referral for continuance of care through her insurance.     Dr Richard Brunner   [id number]  [place] [address]

## 2023-12-15 NOTE — TELEPHONE ENCOUNTER
"According to Jani \"In network referrals ARE NOT required for this patient\".   Information scanned under media tab."

## 2023-12-26 ENCOUNTER — TELEPHONE (OUTPATIENT)
Dept: PRIMARY CARE | Facility: CLINIC | Age: 48
End: 2023-12-26
Payer: MEDICARE

## 2023-12-26 DIAGNOSIS — E11.9 DIABETES MELLITUS TYPE 2 WITHOUT RETINOPATHY (MULTI): Primary | ICD-10-CM

## 2023-12-26 RX ORDER — SEMAGLUTIDE 2.68 MG/ML
2 INJECTION, SOLUTION SUBCUTANEOUS
Qty: 9 ML | Refills: 0 | Status: SHIPPED | OUTPATIENT
Start: 2023-12-26 | End: 2024-02-22 | Stop reason: SDUPTHER

## 2023-12-26 NOTE — TELEPHONE ENCOUNTER
Patient stated that her insurance will not cover wegovy and would like you to send in a script for ozempic

## 2024-01-03 ENCOUNTER — APPOINTMENT (OUTPATIENT)
Dept: CARDIOLOGY | Facility: CLINIC | Age: 49
End: 2024-01-03
Payer: MEDICARE

## 2024-01-04 DIAGNOSIS — M54.12 RADICULOPATHY, CERVICAL REGION: ICD-10-CM

## 2024-01-04 RX ORDER — OMEPRAZOLE 10 MG/1
10 CAPSULE, DELAYED RELEASE ORAL DAILY
Qty: 90 CAPSULE | Refills: 0 | Status: SHIPPED | OUTPATIENT
Start: 2024-01-04

## 2024-01-10 ENCOUNTER — HOSPITAL ENCOUNTER (EMERGENCY)
Age: 49
Discharge: HOME | End: 2024-01-10
Payer: MEDICARE

## 2024-01-10 ENCOUNTER — APPOINTMENT (OUTPATIENT)
Dept: CT IMAGING | Age: 49
End: 2024-01-10
Payer: MEDICARE

## 2024-01-10 ENCOUNTER — APPOINTMENT (OUTPATIENT)
Dept: GENERAL RADIOLOGY | Age: 49
End: 2024-01-10
Payer: MEDICARE

## 2024-01-10 VITALS
SYSTOLIC BLOOD PRESSURE: 100 MMHG | BODY MASS INDEX: 29.23 KG/M2 | HEART RATE: 70 BPM | OXYGEN SATURATION: 96 % | WEIGHT: 165 LBS | DIASTOLIC BLOOD PRESSURE: 55 MMHG | TEMPERATURE: 97.8 F | HEIGHT: 63 IN | RESPIRATION RATE: 18 BRPM

## 2024-01-10 DIAGNOSIS — R20.2 PARESTHESIA: Primary | ICD-10-CM

## 2024-01-10 LAB
ALBUMIN SERPL-MCNC: 4.3 G/DL (ref 3.5–4.6)
ALP SERPL-CCNC: 76 U/L (ref 40–130)
ALT SERPL-CCNC: 18 U/L (ref 0–33)
ANION GAP SERPL CALCULATED.3IONS-SCNC: 11 MEQ/L (ref 9–15)
AST SERPL-CCNC: 15 U/L (ref 0–35)
BASOPHILS # BLD: 0.1 K/UL (ref 0–0.2)
BASOPHILS NFR BLD: 0.5 %
BILIRUB SERPL-MCNC: 0.3 MG/DL (ref 0.2–0.7)
BUN SERPL-MCNC: 12 MG/DL (ref 6–20)
CALCIUM SERPL-MCNC: 9.2 MG/DL (ref 8.5–9.9)
CHLORIDE SERPL-SCNC: 102 MEQ/L (ref 95–107)
CO2 SERPL-SCNC: 26 MEQ/L (ref 20–31)
CREAT SERPL-MCNC: 0.91 MG/DL (ref 0.5–0.9)
EOSINOPHIL # BLD: 0.3 K/UL (ref 0–0.7)
EOSINOPHIL NFR BLD: 3.1 %
ERYTHROCYTE [DISTWIDTH] IN BLOOD BY AUTOMATED COUNT: 12.2 % (ref 11.5–14.5)
GLOBULIN SER CALC-MCNC: 2.9 G/DL (ref 2.3–3.5)
GLUCOSE SERPL-MCNC: 177 MG/DL (ref 70–99)
HCT VFR BLD AUTO: 38.1 % (ref 37–47)
HGB BLD-MCNC: 12.2 G/DL (ref 12–16)
LACTATE BLDV-SCNC: 2 MMOL/L (ref 0.5–2.2)
LYMPHOCYTES # BLD: 2.2 K/UL (ref 1–4.8)
LYMPHOCYTES NFR BLD: 22.7 %
MAGNESIUM SERPL-MCNC: 2 MG/DL (ref 1.7–2.4)
MCH RBC QN AUTO: 31.7 PG (ref 27–31.3)
MCHC RBC AUTO-ENTMCNC: 32 % (ref 33–37)
MCV RBC AUTO: 99 FL (ref 79.4–94.8)
MONOCYTES # BLD: 0.7 K/UL (ref 0.2–0.8)
MONOCYTES NFR BLD: 7 %
NEUTROPHILS # BLD: 6.5 K/UL (ref 1.4–6.5)
NEUTS SEG NFR BLD: 66.4 %
PLATELET # BLD AUTO: 332 K/UL (ref 130–400)
POTASSIUM SERPL-SCNC: 4.3 MEQ/L (ref 3.4–4.9)
PROT SERPL-MCNC: 7.2 G/DL (ref 6.3–8)
RBC # BLD AUTO: 3.85 M/UL (ref 4.2–5.4)
SODIUM SERPL-SCNC: 139 MEQ/L (ref 135–144)
WBC # BLD AUTO: 9.8 K/UL (ref 4.8–10.8)

## 2024-01-10 PROCEDURE — 85025 COMPLETE CBC W/AUTO DIFF WBC: CPT

## 2024-01-10 PROCEDURE — 70450 CT HEAD/BRAIN W/O DYE: CPT

## 2024-01-10 PROCEDURE — 72040 X-RAY EXAM NECK SPINE 2-3 VW: CPT

## 2024-01-10 PROCEDURE — 83735 ASSAY OF MAGNESIUM: CPT

## 2024-01-10 PROCEDURE — 99284 EMERGENCY DEPT VISIT MOD MDM: CPT

## 2024-01-10 PROCEDURE — 80053 COMPREHEN METABOLIC PANEL: CPT

## 2024-01-10 PROCEDURE — 36415 COLL VENOUS BLD VENIPUNCTURE: CPT

## 2024-01-10 PROCEDURE — 83605 ASSAY OF LACTIC ACID: CPT

## 2024-01-10 RX ORDER — LORAZEPAM 2 MG/ML
0.5 INJECTION INTRAMUSCULAR ONCE
Status: DISCONTINUED | OUTPATIENT
Start: 2024-01-10 | End: 2024-01-10 | Stop reason: HOSPADM

## 2024-01-10 ASSESSMENT — ENCOUNTER SYMPTOMS
ABDOMINAL PAIN: 0
NAUSEA: 0
DIARRHEA: 0
VOMITING: 0
SHORTNESS OF BREATH: 0
COUGH: 0

## 2024-01-10 ASSESSMENT — PAIN - FUNCTIONAL ASSESSMENT: PAIN_FUNCTIONAL_ASSESSMENT: NONE - DENIES PAIN

## 2024-01-10 ASSESSMENT — LIFESTYLE VARIABLES
HOW OFTEN DO YOU HAVE A DRINK CONTAINING ALCOHOL: MONTHLY OR LESS
HOW MANY STANDARD DRINKS CONTAINING ALCOHOL DO YOU HAVE ON A TYPICAL DAY: 3 OR 4

## 2024-01-10 NOTE — ED PROVIDER NOTES
[facility] ED  EMERGENCY DEPARTMENT ENCOUNTER      Pt Name: Symone Lazaro  MRN: [id number]  Birthdate 1975  Date of evaluation: 1/10/2024  Provider: Eddie Ward PA-C  4:20 AM EST      CHIEF COMPLAINT       Chief Complaint   Patient presents with    body is vibrating         HISTORY OF PRESENT ILLNESS   (Location/Symptom, Timing/Onset, Context/Setting, Quality, Duration, Modifying Factors, Severity)  Note limiting factors.   Symone Lazaro is a 48 y.o. female who presents to the emergency department for evaluation of \"whole body vibrating\".  Patient states that she has a spinal cord stimulator and normally when this is turned on she feels vibrating in her spinal cord but her stimulator is currently dead.  She states that it feels like her body is vibrating from head to toe as if her spinal cord stimulator was stimulating her whole body and notes tingling in both hands.  Patient notes that she had her battery replaced 2 and half weeks ago and she is wondering if the stimulator is short-circuiting.  Denies any pain anywhere, fever, cough, congestion, shortness of breath, chest pain, abdominal pain, nausea, vomiting, dysuria, diarrhea.    HPI    Nursing Notes were reviewed.    REVIEW OF SYSTEMS    (2-9 systems for level 4, 10 or more for level 5)     Review of Systems   Constitutional:  Negative for fever.        \"Feels like whole body is vibrating\"   HENT:  Negative for congestion.    Respiratory:  Negative for cough and shortness of breath.    Cardiovascular:  Negative for chest pain.   Gastrointestinal:  Negative for abdominal pain, diarrhea, nausea and vomiting.   Genitourinary:  Negative for dysuria.   All other systems reviewed and are negative.      Except as noted above the remainder of the review of systems was reviewed and negative.       PAST MEDICAL HISTORY     Past Medical History:   Diagnosis Date    Anxiety     Chronic back pain     Chronic neck pain     Depression     Diabetes mellitus

## 2024-01-10 NOTE — ED TRIAGE NOTES
Pt arrives via EMS with CO \" whole body buzzing and vibrating like my spinal cord stimulator  but its turned off right now\". Pt denies any other symptoms.

## 2024-01-23 ENCOUNTER — TELEPHONE (OUTPATIENT)
Dept: PRIMARY CARE | Facility: CLINIC | Age: 49
End: 2024-01-23
Payer: MEDICARE

## 2024-02-02 DIAGNOSIS — E11.9 DIABETES MELLITUS TYPE 2 WITHOUT RETINOPATHY (MULTI): ICD-10-CM

## 2024-02-02 RX ORDER — BLOOD-GLUCOSE METER
EACH MISCELLANEOUS
Qty: 100 STRIP | Refills: 3 | Status: SHIPPED | OUTPATIENT
Start: 2024-02-02

## 2024-02-05 ENCOUNTER — HOSPITAL ENCOUNTER (OUTPATIENT)
Dept: RADIOLOGY | Facility: CLINIC | Age: 49
Discharge: HOME | End: 2024-02-05
Payer: MEDICARE

## 2024-02-05 ENCOUNTER — OFFICE VISIT (OUTPATIENT)
Dept: ORTHOPEDIC SURGERY | Facility: CLINIC | Age: 49
End: 2024-02-05
Payer: MEDICARE

## 2024-02-05 DIAGNOSIS — M25.561 RIGHT KNEE PAIN, UNSPECIFIED CHRONICITY: ICD-10-CM

## 2024-02-05 PROCEDURE — 99214 OFFICE O/P EST MOD 30 MIN: CPT | Performed by: ORTHOPAEDIC SURGERY

## 2024-02-05 PROCEDURE — 1036F TOBACCO NON-USER: CPT | Performed by: ORTHOPAEDIC SURGERY

## 2024-02-05 PROCEDURE — 73564 X-RAY EXAM KNEE 4 OR MORE: CPT | Mod: RIGHT SIDE | Performed by: RADIOLOGY

## 2024-02-05 PROCEDURE — 73564 X-RAY EXAM KNEE 4 OR MORE: CPT | Mod: RT

## 2024-02-05 NOTE — PROGRESS NOTES
History of Present Illness   Chief Complaint   Patient presents with    Right Knee - Follow-up       History of Present Illness   Patient is a limiting pain rare occasion.  Weather changes bother.  Functionally stronger.  She does a little bit of irritation around her prior Ethibond sutures.  Overall she is happy.  She lost over 40 pounds     Right knee x-ray: Well aligned patellofemoral arthroplasty.  No interval change in arthritis  Assessment:   Right patellofemoral arthroplasty    Plan:  We reviewed the role of imaging, physical therapy, injections and the time frame to healing and correlation with outcome.  1.  Reassurance.  2.  NSAID: Naproxen.  GI side effects and medical risks discussed  3.  Ice: 30 minutes on and off  4.  Exercise home program: Medically directed knee therapy / Handout given  Follow-up as needed     Physical Exam  Well-nourished, well-developed. No acute distress. Alert and oriented x3. Responds appropriately to questioning. Good mood. Normal affect.  Physical Exam  Right knee:  Skin healthy and intact  No gross swelling or ecchymosis  Trace Effusion:   ROM: 120  Crepitance with range of motion  No pain with internal rotation of the hip  Some mild pain along the quadriceps insertion at the patella.  Ethibond sutures are palpable.  Neurovascular exam normal distally  Palpable pedal pulse and good cap refill     Review of Systems   GENERAL: Negative for malaise, significant weight loss, fever  MUSCULOSKELETAL: See HPI  NEURO:  Negative     Past Medical History:   Diagnosis Date    Hypermetropia, right eye 03/16/2017    Hyperopia of right eye with astigmatism and presbyopia    Hypermetropia, right eye 03/16/2017    Hyperopia of right eye with astigmatism and presbyopia    Hypermetropia, right eye 03/16/2017    Hyperopia of right eye with astigmatism and presbyopia    Hypermetropia, right eye 03/16/2017    Hyperopia of right eye with astigmatism and presbyopia    Personal history of diseases  of the blood and blood-forming organs and certain disorders involving the immune mechanism 06/06/2015    History of leukocytosis    Personal history of diseases of the blood and blood-forming organs and certain disorders involving the immune mechanism 07/16/2020    History of leukocytosis    Personal history of other endocrine, nutritional and metabolic disease 07/18/2019    History of diabetes mellitus    Personal history of urinary (tract) infections 06/30/2015    History of urinary tract infection    Unilateral primary osteoarthritis, right knee 12/20/2022    Right knee DJD       Medication Documentation Review Audit       Reviewed by Hossein Jimenez DO (Physician) on 11/21/23 at 1558      Medication Order Taking? Sig Documenting Provider Last Dose Status   ascorbic acid, vitamin C, 100 mg chewable tablet [id number]  Chew. Historical Provider, MD  Active   atorvastatin (Lipitor) 40 mg tablet [id number]  Take 1 tablet (40 mg) by mouth once daily at bedtime. Hossein Jimenez DO  Active   BETA CAROTENE ORAL [id number]  Take by mouth. Historical Provider, MD  Active   biotin 10,000 mcg capsule [id number]  Take by mouth. Historical Provider, MD  Active   blood sugar diagnostic strip [id number]  1 Units  in the morning and 1 Units in the evening and 1 Units before bedtime. Please fill with strips compatible with One Touch Verio Glucometer.. Hossein Jimenez DO  Active   cholecalciferol (Vitamin D-3) 50 mcg (2,000 unit) capsule [id number]  Take by mouth. Historical Provider, MD  Active   cyanocobalamin (Vitamin B-12) 50 mcg tablet [id number]  Take by mouth. Historical Provider, MD  Active   EPINEPHrine 0.3 mg/0.3 mL injection syringe [id number]  as directed for acute allergic reaction Hossein Jimenez DO  Active   fLuvoxaMINE (Luvox) 25 mg tablet [id number]  Take 0.5 tablets (12.5 mg) by mouth once daily at bedtime. Hossein Jimenez DO  Active   medical cannabis [id number]  Take 1 each by mouth. Historical Provider, MD   Active   hrnlvli-MNUN-wgy-valer-hops-lm 0.15--200 mg capsule [id number]  Take 20 mg by mouth. Historical Provider, MD  Active   midodrine (Proamatine) 2.5 mg tablet [id number]  Take 1 tablet (2.5 mg) by mouth 3 times a day. Hossein Jimenez DO  Active   multivitamin (DAILY-CHRIS ORAL) [id number]  Take 1 tablet by mouth once daily. Historical Provider, MD  Active   multivitamin with folic acid (Daily-Chris, with folic acid,) 400 mcg tablet [id number]  Take 1 tablet by mouth once daily. Historical Provider, MD  Active     Discontinued 11/21/23 1514   omeprazole (PriLOSEC) 10 mg DR capsule [id number]  Take 1 capsule (10 mg) by mouth 1 (one) time each day. Historical Provider, MD  Active   OneTouch Delica Plus Lancet 30 gauge misc [id number]  1 Units by Not Applicable route in the morning and 1 Units in the evening and 1 Units before bedtime. Please fill with lancets compatible with One Touch Verio Glucometer Kit.. Hossein Jimenez DO  Active   OneTouch Verio Reflect Meter misc [id number]  USE AS DIRECTED TO MONITOR BLOOD GLUCOSE Historical Provider, MD  Active   OXcarbazepine (Trileptal) 300 mg tablet [id number]  Take 0.5 tablets (150 mg) by mouth in the morning and 0.5 tablets (150 mg) before bedtime. & 1 tablet at bedtime.. Historical Provider, MD  Active   pioglitazone (Actos) 30 mg tablet [id number]  Take 1 tablet (30 mg) by mouth once daily. as directed Hossein Jimenez DO  Active   prasterone, dhea, 10 mg tablet [id number]  Take by mouth. Historical Provider, MD  Active   semaglutide, weight loss, (Wegovy) 2.4 mg/0.75 mL pen injector [id number]  Inject 2.4 mg under the skin every 7 days. Hossein Jimenez DO  Active                    Allergies   Allergen Reactions    Bee Venom Protein (Honey Bee) Unknown    Fluzone Quad 0647-6709 (Pf) [Flu Vacc Pd5885-57 6mos Up(Pf)] Other     Deathly ill    Guaifenesin Unknown    Guaifenex Unknown    Influenza Virus Vac. Tri-Split Other    Influenza Virus Vaccine Whole Unknown     Metoprolol Unknown    Morphine Unknown and Hives       Social History     Socioeconomic History    Marital status: Single     Spouse name: Not on file    Number of children: Not on file    Years of education: Not on file    Highest education level: Not on file   Occupational History    Not on file   Tobacco Use    Smoking status: Former     Types: Cigarettes    Smokeless tobacco: Never   Vaping Use    Vaping Use: Every day    Substances: Nicotine   Substance and Sexual Activity    Alcohol use: Not Currently    Drug use: Not Currently    Sexual activity: Not on file   Other Topics Concern    Not on file   Social History Narrative    Not on file     Social Determinants of Health     Financial Resource Strain: Not on file   Food Insecurity: Not on file   Transportation Needs: Not on file   Physical Activity: Not on file   Stress: Not on file   Social Connections: Not on file   Intimate Partner Violence: Not on file   Housing Stability: Not on file       Past Surgical History:   Procedure Laterality Date    ANKLE SURGERY  12/04/2017    Ankle Surgery    CT ANGIO NECK  9/28/2022    CT NECK ANGIO W AND WO IV CONTRAST 9/28/2022    CT HEAD ANGIO W AND WO IV CONTRAST  9/28/2022    CT HEAD ANGIO W AND WO IV CONTRAST 9/28/2022    EYE SURGERY  04/17/2014    Eye Surgery    KNEE SURGERY  04/17/2014    Knee Surgery    OTHER SURGICAL HISTORY  04/17/2014    Ovarian Cystectomy    OTHER SURGICAL HISTORY  10/11/2019    Knee surgery    OTHER SURGICAL HISTORY  07/21/2020    Colposcopy    OTHER SURGICAL HISTORY  01/01/2023    Knee replacement partial    OTHER SURGICAL HISTORY  08/17/2020    Hysterectomy total    TUBAL LIGATION  02/27/2015    Tubal Ligation       CT head wo IV contrast    Result Date: 1/10/2024  EXAMINATION: CT OF THE HEAD WITHOUT CONTRAST  1/10/2024 6:00 am TECHNIQUE: CT of the head was performed without the administration of intravenous contrast. Automated exposure control, iterative reconstruction, and/or weight based  "adjustment of the mA/kV was utilized to reduce the radiation dose to as low as reasonably achievable. COMPARISON: 10/31/2023 HISTORY: ORDERING SYSTEM PROVIDED HISTORY: body vibrating TECHNOLOGIST PROVIDED HISTORY: Reason for exam:->body vibrating Has a \"code stroke\" or \"stroke alert\" been called?->No Decision Support Exception - unselect if not a suspected or confirmed emergency medical condition->Emergency Medical Condition (MA) What reading provider will be dictating this exam?->CRC FINDINGS: BRAIN/VENTRICLES: There is no acute intracranial hemorrhage, mass effect or midline shift.  No abnormal extra-axial fluid collection.  The gray-white differentiation is maintained without evidence of an acute infarct.  There is no evidence of hydrocephalus. ORBITS: The visualized portion of the orbits demonstrate no acute abnormality. SINUSES: The visualized paranasal sinuses and mastoid air cells demonstrate no acute abnormality. SOFT TISSUES/SKULL:  No acute abnormality of the visualized skull or soft tissues.    No acute intracranial abnormality.    XR cervical spine 2-3 views    Result Date: 1/10/2024  EXAMINATION: 3 XRAY VIEWS OF THE CERVICAL SPINE 1/10/2024 5:54 am COMPARISON: 08/06/2021 HISTORY: ORDERING SYSTEM PROVIDED HISTORY: eval lead placement TECHNOLOGIST PROVIDED HISTORY: Reason for exam:->eval lead placement Is the patient pregnant?->No What reading provider will be dictating this exam?->CRC FINDINGS: Spinal stimulator leads in the posterior spinal canal, with tip at the level of the C2 spinous process. All 7 cervical vertebrae are visualized and appear normal in height and alignment.   There is no evidence of prevertebral soft tissue edema or fracture.  The base of the odontoid appears intact.    Spinal stimulator leads with tip at the level of the C2 spinous process.                               "

## 2024-02-14 ENCOUNTER — OFFICE VISIT (OUTPATIENT)
Dept: CARDIOLOGY | Facility: CLINIC | Age: 49
End: 2024-02-14
Payer: MEDICARE

## 2024-02-14 ENCOUNTER — LAB (OUTPATIENT)
Dept: LAB | Facility: LAB | Age: 49
End: 2024-02-14
Payer: MEDICARE

## 2024-02-14 VITALS
BODY MASS INDEX: 31.1 KG/M2 | HEART RATE: 80 BPM | SYSTOLIC BLOOD PRESSURE: 106 MMHG | WEIGHT: 169 LBS | DIASTOLIC BLOOD PRESSURE: 68 MMHG | HEIGHT: 62 IN

## 2024-02-14 DIAGNOSIS — E11.9 TYPE 2 DIABETES MELLITUS WITHOUT COMPLICATION, WITHOUT LONG-TERM CURRENT USE OF INSULIN (MULTI): ICD-10-CM

## 2024-02-14 DIAGNOSIS — R42 DIZZINESS: ICD-10-CM

## 2024-02-14 DIAGNOSIS — I95.1 AUTONOMIC ORTHOSTATIC HYPOTENSION: ICD-10-CM

## 2024-02-14 DIAGNOSIS — Z87.891 FORMER SMOKER: ICD-10-CM

## 2024-02-14 DIAGNOSIS — R55 NEAR SYNCOPE: Primary | ICD-10-CM

## 2024-02-14 DIAGNOSIS — R42 LIGHTHEADEDNESS: ICD-10-CM

## 2024-02-14 PROBLEM — G82.50 QUADRIPARESIS (MULTI): Status: ACTIVE | Noted: 2022-09-29

## 2024-02-14 LAB
ALBUMIN SERPL BCP-MCNC: 4.6 G/DL (ref 3.4–5)
ALP SERPL-CCNC: 71 U/L (ref 33–110)
ALT SERPL W P-5'-P-CCNC: 28 U/L (ref 7–45)
ANION GAP SERPL CALC-SCNC: 13 MMOL/L (ref 10–20)
AST SERPL W P-5'-P-CCNC: 23 U/L (ref 9–39)
BILIRUB SERPL-MCNC: 0.4 MG/DL (ref 0–1.2)
BUN SERPL-MCNC: 7 MG/DL (ref 6–23)
CALCIUM SERPL-MCNC: 9.5 MG/DL (ref 8.6–10.3)
CHLORIDE SERPL-SCNC: 100 MMOL/L (ref 98–107)
CO2 SERPL-SCNC: 29 MMOL/L (ref 21–32)
CREAT SERPL-MCNC: 0.96 MG/DL (ref 0.5–1.05)
EGFRCR SERPLBLD CKD-EPI 2021: 73 ML/MIN/1.73M*2
EST. AVERAGE GLUCOSE BLD GHB EST-MCNC: 114 MG/DL
GLUCOSE SERPL-MCNC: 93 MG/DL (ref 74–99)
HBA1C MFR BLD: 5.6 %
POTASSIUM SERPL-SCNC: 3.8 MMOL/L (ref 3.5–5.3)
PROT SERPL-MCNC: 7.6 G/DL (ref 6.4–8.2)
SODIUM SERPL-SCNC: 138 MMOL/L (ref 136–145)

## 2024-02-14 PROCEDURE — 3008F BODY MASS INDEX DOCD: CPT | Performed by: INTERNAL MEDICINE

## 2024-02-14 PROCEDURE — 3074F SYST BP LT 130 MM HG: CPT | Performed by: INTERNAL MEDICINE

## 2024-02-14 PROCEDURE — 36415 COLL VENOUS BLD VENIPUNCTURE: CPT

## 2024-02-14 PROCEDURE — 80053 COMPREHEN METABOLIC PANEL: CPT

## 2024-02-14 PROCEDURE — 99205 OFFICE O/P NEW HI 60 MIN: CPT | Performed by: INTERNAL MEDICINE

## 2024-02-14 PROCEDURE — 3078F DIAST BP <80 MM HG: CPT | Performed by: INTERNAL MEDICINE

## 2024-02-14 PROCEDURE — 1036F TOBACCO NON-USER: CPT | Performed by: INTERNAL MEDICINE

## 2024-02-14 PROCEDURE — 83036 HEMOGLOBIN GLYCOSYLATED A1C: CPT

## 2024-02-14 PROCEDURE — 93000 ELECTROCARDIOGRAM COMPLETE: CPT | Performed by: INTERNAL MEDICINE

## 2024-02-14 RX ORDER — TIZANIDINE 4 MG/1
4 TABLET ORAL 4 TIMES DAILY PRN
COMMUNITY
Start: 2024-01-05

## 2024-02-14 ASSESSMENT — ENCOUNTER SYMPTOMS
NEAR-SYNCOPE: 1
LIGHT-HEADEDNESS: 1
DIZZINESS: 1

## 2024-02-14 NOTE — PROGRESS NOTES
CARDIOLOGY OFFICE VISIT      CHIEF COMPLAINT  Chief Complaint   Patient presents with    Postural Orthostatic Tachycardia Syndrome (Pots)     Referred by Dr Shafer. Title table test in 2018       HISTORY OF PRESENT ILLNESS  HPI    49-year-old female with a past medical history of diabetes mellitus.  Also she has a long history of postural hypotension and she was diagnosed many years ago of POTS syndrome.  Apparently recent primary care physician put her on midodrine.  She states that she was doing well until the last 6 to 9 months when she start noticing episodes more frequent of near syncope.  Most of them related with postural changes.  Patient states that she continues having episodes of dizziness lightheadedness and sometimes she does not feel well for almost 6 to 8 hours during the day.  History of syncope in the past.  Loss of consciousness    Patient is currently using midodrine 2.5 mg twice a day.    Looking at her record she had a tilt table test 5 years ago that was negative for autonomic dysfunction.    EKG performed today shows sinus rhythm at a rate of 73 bpm QRS ration 92 ms QT corrected 151 ms.  Rhythm strip shows the same pattern.    Orthostatics during this evaluation were negative.    Past Medical History  Past Medical History:   Diagnosis Date    Hypermetropia, right eye 03/16/2017    Hyperopia of right eye with astigmatism and presbyopia    Hypermetropia, right eye 03/16/2017    Hyperopia of right eye with astigmatism and presbyopia    Hypermetropia, right eye 03/16/2017    Hyperopia of right eye with astigmatism and presbyopia    Hypermetropia, right eye 03/16/2017    Hyperopia of right eye with astigmatism and presbyopia    Personal history of diseases of the blood and blood-forming organs and certain disorders involving the immune mechanism 06/06/2015    History of leukocytosis    Personal history of diseases of the blood and blood-forming organs and certain disorders involving the immune  mechanism 07/16/2020    History of leukocytosis    Personal history of other endocrine, nutritional and metabolic disease 07/18/2019    History of diabetes mellitus    Personal history of urinary (tract) infections 06/30/2015    History of urinary tract infection    S/P insertion of spinal cord stimulator     Unilateral primary osteoarthritis, right knee 12/20/2022    Right knee DJD       Social History  Social History     Tobacco Use    Smoking status: Former     Types: Cigarettes    Smokeless tobacco: Current   Vaping Use    Vaping Use: Every day    Substances: Nicotine   Substance Use Topics    Alcohol use: Not Currently    Drug use: Not Currently     Types: Marijuana       Family History     Family History   Problem Relation Name Age of Onset    Diabetes Mother      Hypertension Mother      Stroke Mother      Coronary artery disease Father      Other (cardiac disorder) Father      Hypertension Sister      Other (pituitary tumor) Sister      Immunodeficiency Brother      Diabetes Mother's Sister      Breast cancer Father's Sister      Stroke Father's Sister      Diabetes Maternal Grandmother          Allergies:  Allergies   Allergen Reactions    Yellow Jacket Venom Anaphylaxis    Bee Venom Protein (Honey Bee) Unknown    Fluzone Quad 4197-5417 (Pf) [Flu Vacc Ni5277-32 6mos Up(Pf)] Other     Deathly ill    Guaifenesin Unknown    Guaifenex Unknown    Influenza Virus Vac. Tri-Split Other    Influenza Virus Vaccine Whole Unknown    Metoprolol Unknown    Morphine Unknown and Hives    Iodinated Contrast Media Nausea And Vomiting        Outpatient Medications:  Current Outpatient Medications   Medication Instructions    ascorbic acid, vitamin C, 100 mg chewable tablet oral    atorvastatin (LIPITOR) 40 mg, oral, Nightly    biotin 10,000 mcg capsule oral    cholecalciferol (Vitamin D-3) 50 mcg (2,000 unit) capsule oral    cyanocobalamin (Vitamin B-12) 50 mcg tablet oral    EPINEPHrine 0.3 mg/0.3 mL injection syringe as  directed for acute allergic reaction    fLuvoxaMINE (LUVOX) 12.5 mg, oral, Nightly    medical cannabis 1 each, oral    cqrwqjy-YOEA-apl-valer-hops-lm 0.15--200 mg capsule 20 mg, oral    midodrine (PROAMATINE) 2.5 mg, oral, 3 times daily    multivitamin with folic acid (Daily-Mala, with folic acid,) 400 mcg tablet 1 tablet, oral, Daily    omeprazole (PRILOSEC) 10 mg, oral, Daily    OneTouch Delica Plus Lancet 1 Units, Not Applicable, 3 times daily, Please fill with lancets compatible with One Touch Verio Glucometer Kit.    OneTouch Verio Reflect Meter misc USE AS DIRECTED TO MONITOR BLOOD GLUCOSE    OneTouch Verio test strips strip USE TO TEST ONCE DAILY    OXcarbazepine (TRILEPTAL) 150 mg, oral, 2 times daily, & 1 tablet at bedtime.    Ozempic 2 mg, subcutaneous, Every 7 days    pioglitazone (ACTOS) 30 mg, oral, Daily, as directed    prasterone, dhea, 10 mg tablet oral    tiZANidine (ZANAFLEX) 4 mg, oral, 4 times daily PRN          REVIEW OF SYSTEMS  Review of Systems   Constitutional: Positive for malaise/fatigue.   Cardiovascular:  Positive for near-syncope.   Neurological:  Positive for dizziness and light-headedness.   All other systems reviewed and are negative.        VITALS  Vitals:    02/14/24 1555   BP: 118/64   Pulse: 80       PHYSICAL EXAM  Constitutional:       General: Awake.      Appearance: Normal and healthy appearance. Well-developed and not in distress.   Neck:      Vascular: No JVR. JVD normal.   Pulmonary:      Effort: Pulmonary effort is normal.      Breath sounds: Normal breath sounds. No wheezing. No rhonchi. No rales.   Chest:      Chest wall: Not tender to palpatation.   Cardiovascular:      PMI at left midclavicular line. Normal rate. Regular rhythm. Normal S1. Normal S2.       Murmurs: There is no murmur.      No gallop.  No click. No rub.   Pulses:     Intact distal pulses.   Edema:     Peripheral edema absent.   Abdominal:      Tenderness: There is no abdominal tenderness.    Musculoskeletal: Normal range of motion.         General: No tenderness. Skin:     General: Skin is warm and dry.   Neurological:      General: No focal deficit present.      Mental Status: Alert and oriented to person, place and time.           ASSESSMENT AND PLAN  Clinical impression    1.  POTS syndrome  2.  Near syncope  3.  Diabetes mellitus    Plan recommendations    I had a lengthy discussion with patient regarding findings of the tilt table test and possibility of palpitations.  Will repeat a tilt table test.    Meantime patient was instructed to use midodrine 2.5 mg 3 times a day.  Hold midodrine blood pressure systolic above 130 mmHg.    We can try to mix with Florinef or increase the dose of midodrine to 5 mg 3 times a day if she continues having episodes of near syncope.    Also we discussed the option of Northera to treat autonomic dysfunction.    Follow my office in 3 to 4 weeks or sooner needed    Get echocardiogram for left ventricular ejection fraction evaluation.    Risk factor modification and lifestyle modification discussed with patient. Diet , exercise and hydration discussed with patient.    I have personally review with patient during this office visit, laboratory data, echocardiogram results, stress test results, Holter-event monitor results prior and after the last electrophysiology visit. All questions has been answered.    Please excuse any errors in grammar or translation related to this dictation.  Voice recognition software was utilized to prepare this document.

## 2024-02-14 NOTE — PATIENT INSTRUCTIONS
Continue same medications/treatment.  Patient educated on proper medication use.  Patient educated on risk factor modification.  Please bring any lab results from other providers/physicians to your next appointment.    Please bring all medicines, vitamins, and herbal supplements with you when you come to the office.    Prescriptions will not be filled unless you are compliant with your follow up appointments or have a follow up appointment scheduled as per instruction of your physician. Refills should be requested at the time of your visit.    SCHEDULE tilt table test and ECHO  Take your midodrine 2.5 mg three times a day   Follow up with Dr. Valladares in 3-4 weeks     SETH ECHOLS RN, AM SCRIBING FOR, AND IN THE PRESENCE OF DR. ZEINAB VALLADARES MD

## 2024-02-22 ENCOUNTER — OFFICE VISIT (OUTPATIENT)
Dept: PRIMARY CARE | Facility: CLINIC | Age: 49
End: 2024-02-22
Payer: MEDICARE

## 2024-02-22 ENCOUNTER — APPOINTMENT (OUTPATIENT)
Dept: PRIMARY CARE | Facility: CLINIC | Age: 49
End: 2024-02-22
Payer: MEDICARE

## 2024-02-22 VITALS
TEMPERATURE: 97.5 F | DIASTOLIC BLOOD PRESSURE: 70 MMHG | HEIGHT: 62 IN | WEIGHT: 163.14 LBS | BODY MASS INDEX: 30.02 KG/M2 | SYSTOLIC BLOOD PRESSURE: 120 MMHG

## 2024-02-22 DIAGNOSIS — E11.9 DIABETES MELLITUS TYPE 2 WITHOUT RETINOPATHY (MULTI): ICD-10-CM

## 2024-02-22 DIAGNOSIS — G90.A POTS (POSTURAL ORTHOSTATIC TACHYCARDIA SYNDROME): ICD-10-CM

## 2024-02-22 DIAGNOSIS — Z12.12 SCREENING FOR COLORECTAL CANCER: ICD-10-CM

## 2024-02-22 DIAGNOSIS — F42.9 OBSESSIVE-COMPULSIVE DISORDER, UNSPECIFIED TYPE: ICD-10-CM

## 2024-02-22 DIAGNOSIS — E11.9 TYPE 2 DIABETES MELLITUS WITHOUT COMPLICATIONS (MULTI): ICD-10-CM

## 2024-02-22 DIAGNOSIS — E11.40 TYPE 2 DIABETES MELLITUS WITH DIABETIC NEUROPATHY, WITHOUT LONG-TERM CURRENT USE OF INSULIN (MULTI): ICD-10-CM

## 2024-02-22 DIAGNOSIS — Z00.00 ROUTINE GENERAL MEDICAL EXAMINATION AT HEALTH CARE FACILITY: Primary | ICD-10-CM

## 2024-02-22 DIAGNOSIS — Z12.11 SCREENING FOR COLORECTAL CANCER: ICD-10-CM

## 2024-02-22 DIAGNOSIS — F33.9 DEPRESSION, RECURRENT (CMS-HCC): ICD-10-CM

## 2024-02-22 PROBLEM — G82.50 QUADRIPARESIS (MULTI): Status: RESOLVED | Noted: 2022-09-29 | Resolved: 2024-02-22

## 2024-02-22 PROCEDURE — 3008F BODY MASS INDEX DOCD: CPT | Performed by: FAMILY MEDICINE

## 2024-02-22 PROCEDURE — 3078F DIAST BP <80 MM HG: CPT | Performed by: FAMILY MEDICINE

## 2024-02-22 PROCEDURE — 3074F SYST BP LT 130 MM HG: CPT | Performed by: FAMILY MEDICINE

## 2024-02-22 PROCEDURE — G0439 PPPS, SUBSEQ VISIT: HCPCS | Performed by: FAMILY MEDICINE

## 2024-02-22 PROCEDURE — 4004F PT TOBACCO SCREEN RCVD TLK: CPT | Performed by: FAMILY MEDICINE

## 2024-02-22 PROCEDURE — 99396 PREV VISIT EST AGE 40-64: CPT | Performed by: FAMILY MEDICINE

## 2024-02-22 PROCEDURE — 3044F HG A1C LEVEL LT 7.0%: CPT | Performed by: FAMILY MEDICINE

## 2024-02-22 RX ORDER — MIDODRINE HYDROCHLORIDE 2.5 MG/1
2.5 TABLET ORAL 4 TIMES DAILY
Qty: 270 TABLET | Refills: 1
Start: 2024-02-22 | End: 2024-04-02 | Stop reason: SDUPTHER

## 2024-02-22 RX ORDER — PIOGLITAZONEHYDROCHLORIDE 30 MG/1
30 TABLET ORAL DAILY
Qty: 90 TABLET | Refills: 1 | Status: SHIPPED | OUTPATIENT
Start: 2024-02-22 | End: 2024-06-03 | Stop reason: ALTCHOICE

## 2024-02-22 RX ORDER — FLUVOXAMINE MALEATE 25 MG/1
12.5 TABLET ORAL NIGHTLY
Qty: 45 TABLET | Refills: 1 | Status: SHIPPED | OUTPATIENT
Start: 2024-02-22 | End: 2024-02-22 | Stop reason: SDUPTHER

## 2024-02-22 RX ORDER — ATORVASTATIN CALCIUM 40 MG/1
40 TABLET, FILM COATED ORAL NIGHTLY
Qty: 90 TABLET | Refills: 1 | Status: SHIPPED | OUTPATIENT
Start: 2024-02-22 | End: 2024-06-03 | Stop reason: SDUPTHER

## 2024-02-22 RX ORDER — FLUVOXAMINE MALEATE 25 MG/1
12.5 TABLET ORAL NIGHTLY
Qty: 45 TABLET | Refills: 1 | Status: SHIPPED | OUTPATIENT
Start: 2024-02-22 | End: 2024-08-20

## 2024-02-22 RX ORDER — SEMAGLUTIDE 2.68 MG/ML
2 INJECTION, SOLUTION SUBCUTANEOUS
Qty: 9 ML | Refills: 0 | Status: SHIPPED | OUTPATIENT
Start: 2024-02-22 | End: 2024-03-22 | Stop reason: SDUPTHER

## 2024-02-22 ASSESSMENT — PATIENT HEALTH QUESTIONNAIRE - PHQ9
9. THOUGHTS THAT YOU WOULD BE BETTER OFF DEAD, OR OF HURTING YOURSELF: NOT AT ALL
2. FEELING DOWN, DEPRESSED OR HOPELESS: NEARLY EVERY DAY
SUM OF ALL RESPONSES TO PHQ9 QUESTIONS 1 AND 2: 6
3. TROUBLE FALLING OR STAYING ASLEEP OR SLEEPING TOO MUCH: NEARLY EVERY DAY
SUM OF ALL RESPONSES TO PHQ QUESTIONS 1-9: 15
6. FEELING BAD ABOUT YOURSELF - OR THAT YOU ARE A FAILURE OR HAVE LET YOURSELF OR YOUR FAMILY DOWN: NOT AT ALL
10. IF YOU CHECKED OFF ANY PROBLEMS, HOW DIFFICULT HAVE THESE PROBLEMS MADE IT FOR YOU TO DO YOUR WORK, TAKE CARE OF THINGS AT HOME, OR GET ALONG WITH OTHER PEOPLE: EXTREMELY DIFFICULT
1. LITTLE INTEREST OR PLEASURE IN DOING THINGS: NEARLY EVERY DAY
7. TROUBLE CONCENTRATING ON THINGS, SUCH AS READING THE NEWSPAPER OR WATCHING TELEVISION: NOT AT ALL
5. POOR APPETITE OR OVEREATING: NEARLY EVERY DAY
4. FEELING TIRED OR HAVING LITTLE ENERGY: NEARLY EVERY DAY
8. MOVING OR SPEAKING SO SLOWLY THAT OTHER PEOPLE COULD HAVE NOTICED. OR THE OPPOSITE, BEING SO FIGETY OR RESTLESS THAT YOU HAVE BEEN MOVING AROUND A LOT MORE THAN USUAL: NOT AT ALL

## 2024-02-22 ASSESSMENT — ENCOUNTER SYMPTOMS
DEPRESSION: 1
DIARRHEA: 0
VOMITING: 0
NUMBNESS: 0
PALPITATIONS: 0
HEADACHES: 0
RHINORRHEA: 1
VOICE CHANGE: 0
OCCASIONAL FEELINGS OF UNSTEADINESS: 0
WOUND: 0
SLEEP DISTURBANCE: 0
FEVER: 0
LOSS OF SENSATION IN FEET: 0
CONSTIPATION: 0
ABDOMINAL PAIN: 0
ARTHRALGIAS: 1
ROS SKIN COMMENTS: NO MOLES GROWING OR CHANGING.
NERVOUS/ANXIOUS: 0
BACK PAIN: 0
SHORTNESS OF BREATH: 0
HEMATURIA: 0
MYALGIAS: 0
NAUSEA: 0
FATIGUE: 1
SORE THROAT: 0
ADENOPATHY: 0
WHEEZING: 0
COUGH: 0
FREQUENCY: 0
SINUS PRESSURE: 0
WEAKNESS: 0
BLOOD IN STOOL: 0
DIZZINESS: 1
DYSURIA: 0
DYSPHORIC MOOD: 0

## 2024-02-22 ASSESSMENT — ACTIVITIES OF DAILY LIVING (ADL)
GROCERY_SHOPPING: INDEPENDENT
MANAGING_FINANCES: INDEPENDENT
BATHING: INDEPENDENT
DOING_HOUSEWORK: INDEPENDENT
TAKING_MEDICATION: INDEPENDENT
DRESSING: INDEPENDENT

## 2024-02-22 NOTE — PATIENT INSTRUCTIONS
Weight loss is recommended.  I will order a Cologuard.  Labs were reviewed.  I recommend a mammogram but you decline this.   Avoid cigarettes strictly.    Next year, you will be due for a Shingrix and a low dose CT of the chest.  Return in three months.

## 2024-02-22 NOTE — PROGRESS NOTES
Subjective   Reason for Visit: Bhavani Carr is an 49 y.o. female here for a Medicare Wellness visit.        Screening questions brought up concerns for depression. She has had more fatigue than depression.  She sleeps 20 hours a day.  That has been going on for six months.    She thinks that has improved since increasing the midodrin.  She feels a little happier about that as a result.     Has POTS and that has been leading to fatigue and dizziness.       Reviewed all medications by prescribing practitioner or clinical pharmacist (such as prescriptions, OTCs, herbal therapies and supplements) and documented in the medical record.    No surgeries over the last year.  No hospitalizations over the last year.      Had a TKA in 12/22.  HPI    Patient Care Team:  Hossein Jimenez DO as PCP - General  Hossein Jimenez DO as PCP - [employer] PCP  Micheal Simmons MD as Cardiologist (Cardiology)       One tobacco cigarette per month.  She used to smoke one PPD.  Stopped one year ago.  Smoked 1 PPD for 27 years.    Rare alcohol.  Medical marijuana.  No other drugs.    Current Outpatient Medications on File Prior to Visit   Medication Sig Dispense Refill    ascorbic acid, vitamin C, 100 mg chewable tablet Chew.      atorvastatin (Lipitor) 40 mg tablet Take 1 tablet (40 mg) by mouth once daily at bedtime. 90 tablet 1    biotin 10,000 mcg capsule Take by mouth.      cholecalciferol (Vitamin D-3) 50 mcg (2,000 unit) capsule Take by mouth.      cyanocobalamin (Vitamin B-12) 50 mcg tablet Take by mouth.      EPINEPHrine 0.3 mg/0.3 mL injection syringe as directed for acute allergic reaction 2 each 1    fLuvoxaMINE (Luvox) 25 mg tablet Take 0.5 tablets (12.5 mg) by mouth once daily at bedtime. 45 tablet 1    medical cannabis Take 1 each by mouth.      kwgktbu-PGCG-kiy-valer-hops-lm 0.15--200 mg capsule Take 20 mg by mouth.      multivitamin with folic acid (Daily-Mala, with folic acid,) 400 mcg tablet Take  1 tablet by mouth once daily.      omeprazole (PriLOSEC) 10 mg DR capsule TAKE 1 CAPSULE BY MOUTH EVERY DAY 90 capsule 0    OneTouch Delica Plus Lancet 30 gauge misc 1 Units by Not Applicable route in the morning and 1 Units in the evening and 1 Units before bedtime. Please fill with lancets compatible with One Touch Verio Glucometer Kit.. 100 each 1    OneTouch Verio Reflect Meter misc USE AS DIRECTED TO MONITOR BLOOD GLUCOSE      OneTouch Verio test strips strip USE TO TEST ONCE DAILY 100 strip 3    OXcarbazepine (Trileptal) 300 mg tablet Take 0.5 tablets (150 mg) by mouth 2 times a day. & 1 tablet at bedtime.      pioglitazone (Actos) 30 mg tablet Take 1 tablet (30 mg) by mouth once daily. as directed 90 tablet 1    prasterone, dhea, 10 mg tablet Take by mouth.      semaglutide (Ozempic) 2 mg/dose (8 mg/3 mL) pen injector Inject 2 mg under the skin every 7 days. 9 mL 0    tiZANidine (Zanaflex) 4 mg tablet Take 1 tablet (4 mg) by mouth 4 times a day as needed.      [DISCONTINUED] midodrine (Proamatine) 2.5 mg tablet Take 1 tablet (2.5 mg) by mouth 3 times a day. 270 tablet 1     No current facility-administered medications on file prior to visit.       Review of Systems   Constitutional:  Positive for fatigue. Negative for fever.   HENT:  Positive for rhinorrhea. Negative for sinus pressure, sore throat and voice change.    Respiratory:  Negative for cough, shortness of breath and wheezing.    Cardiovascular:  Negative for chest pain, palpitations and leg swelling.   Gastrointestinal:  Negative for abdominal pain, blood in stool, constipation, diarrhea, nausea and vomiting.   Genitourinary:  Negative for dysuria, frequency, hematuria and vaginal bleeding (hysterectomy 2020 for precancer in cervix.).   Musculoskeletal:  Positive for arthralgias (knee pain). Negative for back pain and myalgias.   Skin:  Negative for rash and wound.        No moles growing or changing.   Neurological:  Positive for dizziness.  "Negative for syncope, weakness, numbness and headaches.   Hematological:  Negative for adenopathy.   Psychiatric/Behavioral:  Negative for dysphoric mood, self-injury, sleep disturbance and suicidal ideas. The patient is not nervous/anxious.          Objective   Vitals:  /70 (BP Location: Left arm, Patient Position: Sitting)   Temp 36.4 °C (97.5 °F) (Skin)   Ht 1.575 m (5' 2\")   Wt 74 kg (163 lb 2.3 oz)   BMI 29.84 kg/m²       Physical Exam  Vitals reviewed.   Constitutional:       General: She is not in acute distress.     Appearance: Normal appearance. She is not ill-appearing or toxic-appearing.   HENT:      Head: Normocephalic and atraumatic.      Right Ear: Tympanic membrane, ear canal and external ear normal.      Left Ear: Tympanic membrane, ear canal and external ear normal.      Nose: Nose normal.      Mouth/Throat:      Mouth: Mucous membranes are moist.   Eyes:      Extraocular Movements: Extraocular movements intact.      Conjunctiva/sclera: Conjunctivae normal.      Pupils: Pupils are equal, round, and reactive to light.   Cardiovascular:      Rate and Rhythm: Normal rate and regular rhythm.      Heart sounds: No murmur heard.  Pulmonary:      Effort: Pulmonary effort is normal.      Breath sounds: Normal breath sounds.   Abdominal:      General: Bowel sounds are normal. There is no distension.      Palpations: Abdomen is soft. There is no mass.      Tenderness: There is no abdominal tenderness. There is no guarding or rebound.   Musculoskeletal:         General: No tenderness.      Cervical back: Neck supple.      Right lower leg: No edema.      Left lower leg: No edema.   Skin:     Coloration: Skin is not jaundiced or pale.      Findings: No rash.   Neurological:      General: No focal deficit present.      Mental Status: She is alert and oriented to person, place, and time. Mental status is at baseline.   Psychiatric:         Mood and Affect: Mood normal.         Behavior: Behavior normal.  "        Thought Content: Thought content normal.         Judgment: Judgment normal.         Assessment/Plan   Problem List Items Addressed This Visit    None  Visit Diagnoses       Routine general medical examination at health care facility              Annual Wellness exam completed   Preventive Health history reviewed:  Labs ordered    Mammogram ordered by gyn. Pt refuses.  BMD not indicated.  Cscope declined.  She agrees to Cologuard.    Low dose CT chest for lung cancer screening not indicated d/t age.  Depression Screening done. Depressed but fairly stable on medications.  Advanced Directives Discussion Completed  Cardiovascular risk discussed and if needed, lifestyle modifications recommended, including nutritional choices, exercise, and elimination of habits contributing to risk.  We agreed on a plan to reduce the current cardiovascular risk.  See ecalc ASCVD Risk  Plus for data discussed regarding risk and risk reduction opportunities.  Aspirin use was not recommended after reviewing the updated guidelines. Weight loss is recommend.  Try to avoid sugars and starches and increase exercise.  Continue your same medications.    Vaccines:  Influenza done 10/3/23  Prevnar 20 not indicated  Prevnar 13 not indicated  Pneumovax 23 not indicated  Shingrix not indicated  Tdap done 2019     Weight loss is recommended.  I will order a Cologuard.  Labs were reviewed.  I recommend a mammogram but you decline this.   Avoid cigarettes strictly.    Next year, you will be due for a Shingrix and a low dose CT of the chest.  Return in three months.

## 2024-03-05 ENCOUNTER — APPOINTMENT (OUTPATIENT)
Dept: CARDIOLOGY | Facility: CLINIC | Age: 49
End: 2024-03-05
Payer: MEDICARE

## 2024-03-07 DIAGNOSIS — Z12.11 ENCOUNTER FOR SCREENING FOR MALIGNANT NEOPLASM OF COLON: Primary | ICD-10-CM

## 2024-03-09 NOTE — DISCHARGE INSTRUCTIONS
Hospital Medicine History & Physical Note    Date of Service  3/8/2024    Primary Care Physician  PERLITA Lew    Consultants      Specialist Names:     Code Status  DNAR/DNI    Chief Complaint  Chief Complaint   Patient presents with    Syncope     Pt was laying in bed when she had syncopal episode per staff, pts oxygen went down to 64% per staff on baseline 2L of oxygen. Pt has hx of dementia, but can state the year, person and place       History of Presenting Illness  Davie Montejo is a 87 y.o. female who presented 3/8/2024 with past medical history of dementia, heart failure with reduced EF, coronary disease with history of CABG in 2008, hypertension, hyperlipidemia who presents to the hospital for shortness of breath and hypoxia.  It is associated with lethargy, cough and headaches.  Patient denies any fever, chills, nausea, vomiting, dysuria.  The patient was recently discharged in the hospital on 2/29 where she was found to have decompensated heart failure.  She was found to have bilateral pleural effusions.  She did undergo thoracentesis which showed 1.9 L of fluid removed.  She was discharged with oral Lasix.  Patient was discharged to a skilled nursing facility where she was found today hypoxic laying on her bed.  Patient denies any recent trauma.      Chest x-ray interpreted by me found bilateral pulm edema and bilateral pleural effusions  EKG interpreted by me found normal sinus rhythm, left bundle branch block    I discussed the plan of care with patient.    Review of Systems  Review of Systems   Constitutional:  Negative for chills, diaphoresis, fever and malaise/fatigue.   HENT:  Negative for congestion, ear discharge, ear pain, hearing loss, nosebleeds, sinus pain, sore throat and tinnitus.    Eyes:  Negative for blurred vision, double vision, photophobia and pain.   Respiratory:  Positive for cough and shortness of breath. Negative for hemoptysis, sputum production, wheezing and  No definable source was identified for your sense of vibration of your body during your ED visit today, follow-up with regular family provider and pain specialist.   stridor.    Cardiovascular:  Negative for chest pain, palpitations, orthopnea, claudication, leg swelling and PND.   Gastrointestinal:  Negative for abdominal pain, blood in stool, constipation, diarrhea, heartburn, melena, nausea and vomiting.   Genitourinary:  Negative for dysuria, flank pain, frequency, hematuria and urgency.   Musculoskeletal:  Negative for back pain, falls, joint pain, myalgias and neck pain.   Skin:  Negative for itching and rash.   Neurological:  Positive for headaches. Negative for dizziness, tingling, tremors and weakness.   Endo/Heme/Allergies:  Negative for environmental allergies and polydipsia. Does not bruise/bleed easily.   Psychiatric/Behavioral:  Negative for depression, hallucinations, substance abuse and suicidal ideas.        Past Medical History   has a past medical history of Acute blood loss anemia (10/21/2021), Acute on chronic systolic heart failure ([facility]) (8/24/2020), Acute perforated gastric ulcer with hemorrhage ([facility]) (10/21/2021), Anemia (2/14/2022), Angina decubitus (6/18/2010), CAD (coronary artery disease), Cannabis use, unspecified with intoxication, uncomplicated ([facility]) (2/14/2022), Fibromyalgia, Generalized abdominal pain (12/5/2021), Heart attack ([facility]) (2008), Hypotension due to medication (7/18/2022), Intractable nausea and vomiting (12/4/2021), and Pain in the chest (11/15/2020).    Surgical History   has a past surgical history that includes coronary artery bypas, 4 (2008) and coronary artery bypass, 1 (2008).     Family History  family history is not on file.   Family history reviewed with patient. There is no family history that is pertinent to the chief complaint.     Social History   reports that she has quit smoking. She has never used smokeless tobacco. She reports that she does not currently use drugs. She reports that she does not drink alcohol.    Allergies  Allergies   Allergen Reactions    Latex Unspecified          Tramadol Rash     Itchy red in  "nature    Codeine Unspecified     Unknown reaction      Lisinopril Unspecified     Unknown reaction    Penicillin G Unspecified     Unknown reaction      Pregabalin Unspecified     Lyrica affects patients vision.    Simvastatin Unspecified     Unknown reaction      Sulfa Drugs Unspecified     Patient states \"I can't remember what this does to me\" but recalls and allergy.        Medications  Prior to Admission Medications   Prescriptions Last Dose Informant Patient Reported? Taking?   DULoxetine (CYMBALTA) 60 MG Cap DR Particles delayed-release capsule  Patient No No   Sig: Take 1 Capsule by mouth every day. AFTER FINISHING 30MG DAILY FOR 7 DAYS.  FOR FIBROMYALGIA PAIN.   Misc Natural Products (TURMERIC CURCUMIN) Cap  Patient Yes No   Sig: Take 250 mg by mouth every day.   atorvastatin (LIPITOR) 10 MG Tab  Patient No No   Sig: Take 1 Tablet by mouth every evening. FOR CHOLESTEROL.   clopidogrel (PLAVIX) 75 MG Tab  Patient No No   Sig: TAKE 1 TABLET BY MOUTH EVERY DAY.   estradiol (ESTRACE) 1 MG Tab  Patient No No   Sig: TAKE 1 TABLET BY MOUTH EVERY DAY.   gabapentin (NEURONTIN) 600 MG tablet  Patient No No   Sig: Take 1 Tablet by mouth 2 times a day.   levothyroxine (SYNTHROID) 125 MCG Tab  Patient No No   Sig: TAKE 1 TABLET BY MOUTH EVERY MORNING ON AN EMPTY STOMACH.   loratadine (CLARITIN) 10 MG Tab  Patient No No   Sig: Take 1 Tablet by mouth every day.   losartan (COZAAR) 25 MG Tab  Patient No No   Sig: Take 1 Tablet by mouth every day. FOR BLOOD PRESSURE   metoprolol SR (TOPROL XL) 25 MG TABLET SR 24 HR   No No   Sig: Take 0.5 Tablets by mouth every evening.   nitroglycerin (NITROSTAT) 0.4 MG SL Tab  Patient No No   Sig: PLACE 1 TABLET UNDER THE TONGUE AS NEEDED FOR CHEST PAIN. CALL MD IF THREE PILLS TAKEN EVERY 5 MIN WITH NO *   Patient taking differently: Place 0.4-0.8 mg under the tongue 1 time a day as needed for Chest Pain.   omeprazole (PRILOSEC) 20 MG delayed-release capsule  Patient No No   Sig: TAKE 1 " CAPSULE BY MOUTH EVERY DAY.   potassium chloride SA (K-DUR) 10 MEQ Tab CR   No No   Sig: Take 1 Tablet by mouth every day.   spironolactone (ALDACTONE) 25 MG Tab   No No   Sig: Take 1 Tablet by mouth every day.   sucralfate (CARAFATE) 1 GM Tab  Patient No No   Sig: TAKE 1 TABLET BY MOUTH TWO (TWO) TIMES A DAY.   Patient taking differently: Take 1 g by mouth 2 times a day.      Facility-Administered Medications: None       Physical Exam  Temp:  [36.1 °C (97 °F)-36.3 °C (97.3 °F)] 36.3 °C (97.3 °F)  Pulse:  [83-92] 89  Resp:  [14-30] 14  BP: (106-125)/(55-70) 114/70  SpO2:  [94 %-99 %] 99 %  Blood Pressure : 114/70   Temperature: 36.3 °C (97.3 °F)   Pulse: 89   Respiration: 14   Pulse Oximetry: 99 %       Physical Exam  Vitals and nursing note reviewed.   Constitutional:       General: She is not in acute distress.     Appearance: Normal appearance. She is not ill-appearing, toxic-appearing or diaphoretic.   HENT:      Head: Normocephalic and atraumatic.      Nose: No congestion or rhinorrhea.      Mouth/Throat:      Pharynx: No oropharyngeal exudate or posterior oropharyngeal erythema.   Eyes:      General: No scleral icterus.  Neck:      Vascular: JVD present. No carotid bruit.   Cardiovascular:      Rate and Rhythm: Normal rate and regular rhythm.      Pulses: Normal pulses.      Heart sounds: Normal heart sounds. No murmur heard.     No friction rub. No gallop.   Pulmonary:      Effort: Pulmonary effort is normal. No respiratory distress.      Breath sounds: No stridor. Rales present. No wheezing or rhonchi.   Abdominal:      General: Abdomen is flat. There is no distension.      Palpations: There is no mass.      Tenderness: There is no abdominal tenderness. There is no left CVA tenderness, guarding or rebound.      Hernia: No hernia is present.   Musculoskeletal:         General: No swelling. Normal range of motion.      Cervical back: No rigidity. No muscular tenderness.      Right lower leg: Edema present.       Left lower leg: Edema present.   Lymphadenopathy:      Cervical: No cervical adenopathy.   Skin:     General: Skin is warm and dry.      Capillary Refill: Capillary refill takes more than 3 seconds.      Coloration: Skin is not jaundiced or pale.      Findings: No bruising or erythema.   Neurological:      Mental Status: She is alert.         Laboratory:  Recent Labs     03/08/24  1755   WBC 4.1*   RBC 3.38*   HEMOGLOBIN 8.8*   HEMATOCRIT 28.5*   MCV 84.3   MCH 26.0*   MCHC 30.9*   RDW 56.7*   PLATELETCT 215   MPV 11.1     Recent Labs     03/08/24  1755   SODIUM 143   POTASSIUM 3.6   CHLORIDE 114*   CO2 20   GLUCOSE 73   BUN 11   CREATININE 0.59   CALCIUM 6.2*     Recent Labs     03/08/24  1755   ALTSGPT 5   ASTSGOT 12   ALKPHOSPHAT 55   TBILIRUBIN 0.3   GLUCOSE 73         Recent Labs     03/08/24  1755   NTPROBNP 1975*         Recent Labs     03/08/24  1755   TROPONINT 17       Imaging:  CT-ABDOMEN-PELVIS WITH   Final Result      1.  Increased colonic stool.   2.  No bowel obstruction or perforation.   3.  Moderate bilateral pleural effusions with associated atelectasis.      DX-CHEST-PORTABLE (1 VIEW)   Final Result      Findings suggesting CHF with interstitial pulmonary edema, small pleural effusions and atelectasis.          X-Ray:  I have personally reviewed the images and compared with prior images.  EKG:  I have personally reviewed the images and compared with prior images.    Assessment/Plan:  Justification for Admission Status  I anticipate this patient will require at least two midnights for appropriate medical management, necessitating inpatient admission because respiratory failure    Patient will need a Telemetry bed on MEDICAL service .  The need is secondary to respiratory failure.    * Pleural effusion- (present on admission)  Assessment & Plan  Likely transudate  I will start IV Lasix for now and if the pleural effusions have not resolved she should get a thoracentesis    Acute systolic congestive  heart failure (HCC)- (present on admission)  Assessment & Plan  Decompensated  Continue losartan, metoprolol, Aldactone  IV Lasix 40 3 times daily  Monitor on telemetry  Strict ins and outs and daily weights    Severe protein-calorie malnutrition ([place]: less than 60% of standard weight) ([facility])  Assessment & Plan  Dietary consult  Monitor electrolytes    Acute respiratory failure with hypoxia ([facility])  Assessment & Plan  2 L of O2 above her baseline    Vascular dementia with paranoia ([facility])- (present on admission)  Assessment & Plan  Frequent reorientation  Patient was found to be lethargic on examination which is likely due to polypharmacy.  I will discontinue her gabapentin.  Avoid sedative medications.    Anemia- (present on admission)  Assessment & Plan  Unknown etiology  Check iron panel, ferritin, B12, reticulocyte count    Hypothyroid- (present on admission)  Assessment & Plan  Continue thyroid medications    Essential hypertension- (present on admission)  Assessment & Plan  controlled  Continue current home medications  IV as needed medications have been ordered      Coronary artery disease involving coronary bypass graft of native heart- (present on admission)  Assessment & Plan  Continue Plavix and statins        VTE prophylaxis: SCDs/TEDs      I discussed advance care planning for at least 20 minutes with the patient  including diagnosis, prognosis, plan of care, risks and benefits of any therapies that could be offered, as well as alternatives including palliation and hospice, as appropriate. The patient has opted  DNR Time spent is exclusive of evaluation and management or other separately billable procedures.

## 2024-03-12 ENCOUNTER — HOSPITAL ENCOUNTER (OUTPATIENT)
Dept: CARDIOLOGY | Facility: CLINIC | Age: 49
Discharge: HOME | End: 2024-03-12
Payer: MEDICARE

## 2024-03-12 VITALS
BODY MASS INDEX: 30 KG/M2 | DIASTOLIC BLOOD PRESSURE: 60 MMHG | WEIGHT: 163 LBS | HEIGHT: 62 IN | SYSTOLIC BLOOD PRESSURE: 110 MMHG

## 2024-03-12 DIAGNOSIS — R42 LIGHTHEADEDNESS: ICD-10-CM

## 2024-03-12 DIAGNOSIS — R55 NEAR SYNCOPE: ICD-10-CM

## 2024-03-12 DIAGNOSIS — R42 DIZZINESS: ICD-10-CM

## 2024-03-12 LAB
AORTIC VALVE MEAN GRADIENT: 7 MMHG
AORTIC VALVE PEAK VELOCITY: 1.71 M/S
AV PEAK GRADIENT: 11.7 MMHG
AVA (PEAK VEL): 1.47 CM2
AVA (VTI): 1.51 CM2
EJECTION FRACTION APICAL 4 CHAMBER: 54.9
EJECTION FRACTION: 55 %
LEFT VENTRICLE INTERNAL DIMENSION DIASTOLE: 4.6 CM (ref 3.5–6)
LEFT VENTRICULAR OUTFLOW TRACT DIAMETER: 1.7 CM
MITRAL VALVE E/A RATIO: 1.78
MITRAL VALVE E/E' RATIO: 8.1
RIGHT VENTRICLE PEAK SYSTOLIC PRESSURE: 20 MMHG

## 2024-03-12 PROCEDURE — 93306 TTE W/DOPPLER COMPLETE: CPT

## 2024-03-12 PROCEDURE — 93306 TTE W/DOPPLER COMPLETE: CPT | Performed by: INTERNAL MEDICINE

## 2024-03-22 ENCOUNTER — PATIENT MESSAGE (OUTPATIENT)
Dept: PRIMARY CARE | Facility: CLINIC | Age: 49
End: 2024-03-22
Payer: MEDICARE

## 2024-03-22 DIAGNOSIS — E11.9 DIABETES MELLITUS TYPE 2 WITHOUT RETINOPATHY (MULTI): ICD-10-CM

## 2024-03-22 RX ORDER — SEMAGLUTIDE 2.68 MG/ML
2 INJECTION, SOLUTION SUBCUTANEOUS
Qty: 9 ML | Refills: 0 | Status: SHIPPED | OUTPATIENT
Start: 2024-03-22 | End: 2024-06-03 | Stop reason: SDUPTHER

## 2024-03-25 ENCOUNTER — HOSPITAL ENCOUNTER (OUTPATIENT)
Dept: CARDIOLOGY | Facility: HOSPITAL | Age: 49
Discharge: HOME | End: 2024-03-25
Payer: MEDICARE

## 2024-03-25 VITALS — DIASTOLIC BLOOD PRESSURE: 69 MMHG | SYSTOLIC BLOOD PRESSURE: 107 MMHG | OXYGEN SATURATION: 100 % | HEART RATE: 67 BPM

## 2024-03-25 DIAGNOSIS — I95.1 AUTONOMIC ORTHOSTATIC HYPOTENSION: ICD-10-CM

## 2024-03-25 DIAGNOSIS — R42 LIGHTHEADEDNESS: ICD-10-CM

## 2024-03-25 DIAGNOSIS — R55 NEAR SYNCOPE: ICD-10-CM

## 2024-03-25 PROCEDURE — 93660 TILT TABLE EVALUATION: CPT | Performed by: INTERNAL MEDICINE

## 2024-03-25 PROCEDURE — 93660 TILT TABLE EVALUATION: CPT

## 2024-03-30 LAB — NONINV COLON CA DNA+OCC BLD SCRN STL QL: NEGATIVE

## 2024-04-01 ENCOUNTER — APPOINTMENT (OUTPATIENT)
Dept: CARDIOLOGY | Facility: CLINIC | Age: 49
End: 2024-04-01
Payer: MEDICARE

## 2024-04-01 ENCOUNTER — HOSPITAL ENCOUNTER (EMERGENCY)
Age: 49
Discharge: HOME | End: 2024-04-02
Payer: MEDICARE

## 2024-04-01 DIAGNOSIS — R42 DIZZINESS: Primary | ICD-10-CM

## 2024-04-01 PROCEDURE — 99284 EMERGENCY DEPT VISIT MOD MDM: CPT

## 2024-04-01 PROCEDURE — 51701 INSERT BLADDER CATHETER: CPT

## 2024-04-02 ENCOUNTER — LAB (OUTPATIENT)
Dept: LAB | Facility: LAB | Age: 49
End: 2024-04-02
Payer: MEDICARE

## 2024-04-02 ENCOUNTER — APPOINTMENT (OUTPATIENT)
Dept: CT IMAGING | Age: 49
End: 2024-04-02
Payer: MEDICARE

## 2024-04-02 ENCOUNTER — OFFICE VISIT (OUTPATIENT)
Dept: CARDIOLOGY | Facility: CLINIC | Age: 49
End: 2024-04-02
Payer: MEDICARE

## 2024-04-02 VITALS
WEIGHT: 163 LBS | HEART RATE: 69 BPM | BODY MASS INDEX: 30 KG/M2 | HEIGHT: 62 IN | SYSTOLIC BLOOD PRESSURE: 104 MMHG | DIASTOLIC BLOOD PRESSURE: 62 MMHG

## 2024-04-02 VITALS
BODY MASS INDEX: 29.23 KG/M2 | SYSTOLIC BLOOD PRESSURE: 101 MMHG | WEIGHT: 165 LBS | TEMPERATURE: 98.2 F | DIASTOLIC BLOOD PRESSURE: 65 MMHG | HEART RATE: 62 BPM | RESPIRATION RATE: 17 BRPM | OXYGEN SATURATION: 97 %

## 2024-04-02 DIAGNOSIS — G40.909 EPILEPSY, UNSPECIFIED, NOT INTRACTABLE, WITHOUT STATUS EPILEPTICUS (MULTI): Primary | ICD-10-CM

## 2024-04-02 DIAGNOSIS — E11.9 DIABETES MELLITUS TYPE 2 WITHOUT RETINOPATHY (MULTI): ICD-10-CM

## 2024-04-02 DIAGNOSIS — R55 VASODEPRESSOR SYNCOPE: ICD-10-CM

## 2024-04-02 DIAGNOSIS — G90.A POTS (POSTURAL ORTHOSTATIC TACHYCARDIA SYNDROME): ICD-10-CM

## 2024-04-02 DIAGNOSIS — I95.1 AUTONOMIC ORTHOSTATIC HYPOTENSION: Primary | ICD-10-CM

## 2024-04-02 LAB
ALBUMIN SERPL-MCNC: 3.9 G/DL (ref 3.5–4.6)
ALP SERPL-CCNC: 78 U/L (ref 40–130)
ALT SERPL-CCNC: 16 U/L (ref 0–33)
ANION GAP SERPL CALCULATED.3IONS-SCNC: 10 MEQ/L (ref 9–15)
AST SERPL-CCNC: 21 U/L (ref 0–35)
BASOPHILS # BLD: 0.1 K/UL (ref 0–0.2)
BASOPHILS NFR BLD: 0.6 %
BILIRUB SERPL-MCNC: <0.2 MG/DL (ref 0.2–0.7)
BILIRUB UR QL STRIP: NEGATIVE
BUN SERPL-MCNC: 9 MG/DL (ref 6–20)
CALCIUM SERPL-MCNC: 8.6 MG/DL (ref 8.5–9.9)
CHLORIDE SERPL-SCNC: 101 MEQ/L (ref 95–107)
CLARITY UR: ABNORMAL
CO2 SERPL-SCNC: 24 MEQ/L (ref 20–31)
COLOR UR: YELLOW
CREAT SERPL-MCNC: 0.8 MG/DL (ref 0.5–0.9)
D DIMER PPP FEU-MCNC: 0.46 MG/L FEU (ref 0–0.5)
EKG ATRIAL RATE: 240 BPM
EKG P AXIS: -43 DEGREES
EKG Q-T INTERVAL: 420 MS
EKG QRS DURATION: 90 MS
EKG QTC CALCULATION (BAZETT): 420 MS
EKG R AXIS: 11 DEGREES
EKG T AXIS: -3 DEGREES
EKG VENTRICULAR RATE: 60 BPM
EOSINOPHIL # BLD: 0.2 K/UL (ref 0–0.7)
EOSINOPHIL NFR BLD: 2.7 %
ERYTHROCYTE [DISTWIDTH] IN BLOOD BY AUTOMATED COUNT: 12.7 % (ref 11.5–14.5)
GLOBULIN SER CALC-MCNC: 2.9 G/DL (ref 2.3–3.5)
GLUCOSE SERPL-MCNC: 221 MG/DL (ref 70–99)
GLUCOSE UR STRIP-MCNC: >=1000 MG/DL
HCT VFR BLD AUTO: 32.4 % (ref 37–47)
HGB BLD-MCNC: 10.7 G/DL (ref 12–16)
HGB UR QL STRIP: NEGATIVE
KETONES UR STRIP-MCNC: NEGATIVE MG/DL
LACTATE BLDV-SCNC: 2.1 MMOL/L (ref 0.5–2.2)
LEUKOCYTE ESTERASE UR QL STRIP: NEGATIVE
LYMPHOCYTES # BLD: 2.3 K/UL (ref 1–4.8)
LYMPHOCYTES NFR BLD: 25.8 %
MAGNESIUM SERPL-MCNC: 1.9 MG/DL (ref 1.7–2.4)
MCH RBC QN AUTO: 31.7 PG (ref 27–31.3)
MCHC RBC AUTO-ENTMCNC: 33 % (ref 33–37)
MCV RBC AUTO: 95.9 FL (ref 79.4–94.8)
MONOCYTES # BLD: 0.7 K/UL (ref 0.2–0.8)
MONOCYTES NFR BLD: 7.5 %
NEUTROPHILS # BLD: 5.6 K/UL (ref 1.4–6.5)
NEUTS SEG NFR BLD: 62.9 %
NITRITE UR QL STRIP: NEGATIVE
PH UR STRIP: 5.5 [PH] (ref 5–9)
PLATELET # BLD AUTO: 307 K/UL (ref 130–400)
POTASSIUM SERPL-SCNC: 4.1 MEQ/L (ref 3.4–4.9)
PROT SERPL-MCNC: 6.8 G/DL (ref 6.3–8)
PROT UR STRIP-MCNC: NEGATIVE MG/DL
RBC # BLD AUTO: 3.38 M/UL (ref 4.2–5.4)
SODIUM SERPL-SCNC: 135 MEQ/L (ref 135–144)
SP GR UR STRIP: 1.02 (ref 1–1.03)
TROPONIN, HIGH SENSITIVITY: <6 NG/L (ref 0–19)
TROPONIN, HIGH SENSITIVITY: <6 NG/L (ref 0–19)
TSH SERPL-MCNC: 2.38 UIU/ML (ref 0.44–3.86)
URINE REFLEX TO CULTURE: ABNORMAL
UROBILINOGEN UR STRIP-ACNC: 0.2 E.U./DL
WBC # BLD AUTO: 8.8 K/UL (ref 4.8–10.8)

## 2024-04-02 PROCEDURE — 81003 URINALYSIS AUTO W/O SCOPE: CPT

## 2024-04-02 PROCEDURE — 93010 ELECTROCARDIOGRAM REPORT: CPT | Performed by: INTERNAL MEDICINE

## 2024-04-02 PROCEDURE — 96360 HYDRATION IV INFUSION INIT: CPT

## 2024-04-02 PROCEDURE — 4004F PT TOBACCO SCREEN RCVD TLK: CPT | Performed by: NURSE PRACTITIONER

## 2024-04-02 PROCEDURE — 2580000003 HC RX 258: Performed by: PHYSICIAN ASSISTANT

## 2024-04-02 PROCEDURE — 36415 COLL VENOUS BLD VENIPUNCTURE: CPT

## 2024-04-02 PROCEDURE — 3078F DIAST BP <80 MM HG: CPT | Performed by: NURSE PRACTITIONER

## 2024-04-02 PROCEDURE — 3044F HG A1C LEVEL LT 7.0%: CPT | Performed by: NURSE PRACTITIONER

## 2024-04-02 PROCEDURE — 93005 ELECTROCARDIOGRAM TRACING: CPT | Performed by: PHYSICIAN ASSISTANT

## 2024-04-02 PROCEDURE — 80053 COMPREHEN METABOLIC PANEL: CPT

## 2024-04-02 PROCEDURE — 3074F SYST BP LT 130 MM HG: CPT | Performed by: NURSE PRACTITIONER

## 2024-04-02 PROCEDURE — 84443 ASSAY THYROID STIM HORMONE: CPT

## 2024-04-02 PROCEDURE — 2580000003 HC RX 258: Performed by: EMERGENCY MEDICINE

## 2024-04-02 PROCEDURE — 83735 ASSAY OF MAGNESIUM: CPT

## 2024-04-02 PROCEDURE — 3008F BODY MASS INDEX DOCD: CPT | Performed by: NURSE PRACTITIONER

## 2024-04-02 PROCEDURE — 80183 DRUG SCRN QUANT OXCARBAZEPIN: CPT

## 2024-04-02 PROCEDURE — 85025 COMPLETE CBC W/AUTO DIFF WBC: CPT

## 2024-04-02 PROCEDURE — 70450 CT HEAD/BRAIN W/O DYE: CPT

## 2024-04-02 PROCEDURE — 99214 OFFICE O/P EST MOD 30 MIN: CPT | Performed by: NURSE PRACTITIONER

## 2024-04-02 PROCEDURE — 83605 ASSAY OF LACTIC ACID: CPT

## 2024-04-02 PROCEDURE — 85379 FIBRIN DEGRADATION QUANT: CPT

## 2024-04-02 PROCEDURE — 84484 ASSAY OF TROPONIN QUANT: CPT

## 2024-04-02 RX ORDER — MIDODRINE HYDROCHLORIDE 2.5 MG/1
TABLET ORAL
Qty: 540 TABLET | Refills: 3 | Status: SHIPPED | OUTPATIENT
Start: 2024-04-02 | End: 2024-05-02 | Stop reason: SDUPTHER

## 2024-04-02 RX ORDER — 0.9 % SODIUM CHLORIDE 0.9 %
1000 INTRAVENOUS SOLUTION INTRAVENOUS ONCE
Status: COMPLETED | OUTPATIENT
Start: 2024-04-02 | End: 2024-04-02

## 2024-04-02 RX ADMIN — SODIUM CHLORIDE 1000 ML: 9 INJECTION, SOLUTION INTRAVENOUS at 00:18

## 2024-04-02 RX ADMIN — SODIUM CHLORIDE 1000 ML: 9 INJECTION, SOLUTION INTRAVENOUS at 02:51

## 2024-04-02 ASSESSMENT — ENCOUNTER SYMPTOMS
VOMITING: 0
CHEST TIGHTNESS: 0
SHORTNESS OF BREATH: 0

## 2024-04-02 NOTE — PROGRESS NOTES
"CARDIOLOGY OFFICE VISIT      CHIEF COMPLAINT  Chief Complaint   Patient presents with    autonomic orthostatic hypotension     Chief complaint: \"I was at the emergency room again last night with dizziness.\"  HISTORY OF PRESENT ILLNESS  HPI  History: The patient is a 49-year-old  female who is followed for postural orthostatic tachycardia syndrome, autonomic dysfunction, and positive head up tilt table test with nitroglycerin provocation consistent with vasodepressive response.  At 29 minutes into the tilt the patient's blood pressure dropped to 48/36 with a heart rate of 78 bpm.  Patient is maintained on midodrine.  She states that she has been taking 5 mg in the morning and 2.5 mg in the afternoon and evening.  She states yesterday she presented to the emergency room at [facility] for complaints of dizziness.  She states that she was rehydrated with IV fluids.  The patient reports that she does not like to drink plain water.  She states that she does drink vitamin water.  She also reports that she cannot drink anything with artificial flavoring due to her history of diabetes.  She reports that she does drink 1 cup of coffee in the morning as well as tea during the day.  She will occasionally drink a glass of pop when she goes out to dinner with friends which is approximately once a month.  She denies chest pain, palpitations, shortness of breath, abdominal distention, or lower extremity edema.  Past Medical History  Past Medical History:   Diagnosis Date    Hypermetropia, right eye 03/16/2017    Hyperopia of right eye with astigmatism and presbyopia    Hypermetropia, right eye 03/16/2017    Hyperopia of right eye with astigmatism and presbyopia    Hypermetropia, right eye 03/16/2017    Hyperopia of right eye with astigmatism and presbyopia    Hypermetropia, right eye 03/16/2017    Hyperopia of right eye with astigmatism and presbyopia    Personal history of diseases of the blood and " blood-forming organs and certain disorders involving the immune mechanism 06/06/2015    History of leukocytosis    Personal history of diseases of the blood and blood-forming organs and certain disorders involving the immune mechanism 07/16/2020    History of leukocytosis    Personal history of other endocrine, nutritional and metabolic disease 07/18/2019    History of diabetes mellitus    Personal history of urinary (tract) infections 06/30/2015    History of urinary tract infection    S/P insertion of spinal cord stimulator     Unilateral primary osteoarthritis, right knee 12/20/2022    Right knee DJD       Social History  Social History     Tobacco Use    Smoking status: Former     Types: Cigarettes    Smokeless tobacco: Current    Tobacco comments:     Vapes daily   Vaping Use    Vaping Use: Every day    Substances: Nicotine   Substance Use Topics    Alcohol use: Not Currently    Drug use: Not Currently     Types: Marijuana       Family History     Family History   Problem Relation Name Age of Onset    Diabetes Mother      Hypertension Mother      Stroke Mother      Coronary artery disease Father      Other (cardiac disorder) Father      Hypertension Sister      Other (pituitary tumor) Sister      Immunodeficiency Brother      Diabetes Mother's Sister      Breast cancer Father's Sister      Stroke Father's Sister      Diabetes Maternal Grandmother          Allergies:  Allergies   Allergen Reactions    Yellow Jacket Venom Anaphylaxis    Bee Venom Protein (Honey Bee) Unknown    Fluzone Quad 1095-0166 (Pf) [Flu Vacc [id number] 6mos Up(Pf)] Other     Deathly ill    Guaifenesin Unknown    Guaifenex Unknown    Influenza Virus Vac. Tri-Split Other    Influenza Virus Vaccine Whole Unknown    Metoprolol Unknown    Morphine Unknown and Hives    Iodinated Contrast Media Nausea And Vomiting        Outpatient Medications:  Current Outpatient Medications   Medication Instructions    ascorbic acid, vitamin C, 100 mg chewable  tablet oral    atorvastatin (LIPITOR) 40 mg, oral, Nightly    biotin 10,000 mcg capsule oral    cholecalciferol (Vitamin D-3) 50 mcg (2,000 unit) capsule oral    cyanocobalamin (Vitamin B-12) 50 mcg tablet oral    EPINEPHrine 0.3 mg/0.3 mL injection syringe as directed for acute allergic reaction    fLuvoxaMINE (LUVOX) 12.5 mg, oral, Nightly    medical cannabis 1 each, oral    mjypgca-CDSG-xup-valer-hops-lm 0.15--200 mg capsule 20 mg, oral    midodrine (PROAMATINE) 2.5 mg, oral, 4 times daily    multivitamin with folic acid (Daily-Mala, with folic acid,) 400 mcg tablet 1 tablet, oral, Daily    omeprazole (PRILOSEC) 10 mg, oral, Daily    OneTouch Delica Plus Lancet 1 Units, Not Applicable, 3 times daily, Please fill with lancets compatible with One Touch Verio Glucometer Kit.    OneTouch Verio Reflect Meter misc USE AS DIRECTED TO MONITOR BLOOD GLUCOSE    OneTouch Verio test strips strip USE TO TEST ONCE DAILY    OXcarbazepine (TRILEPTAL) 150 mg, oral, 2 times daily, & 1 tablet at bedtime.    Ozempic 2 mg, subcutaneous, Every 7 days    pioglitazone (ACTOS) 30 mg, oral, Daily, as directed    prasterone, dhea, 10 mg tablet oral    tiZANidine (ZANAFLEX) 4 mg, oral, 4 times daily PRN          REVIEW OF SYSTEMS  Review of Systems   All other systems reviewed and are negative.        VITALS  Vitals:    04/02/24 1330   BP: 104/62   Pulse: 69       PHYSICAL EXAM  Vitals and nursing note reviewed.   Constitutional:       Appearance: Normal appearance.   HENT:      Head: Normocephalic.   Neck:      Vascular: No JVD.   Cardiovascular:      Rate and Rhythm: Normal rate and regular rhythm.      Pulses: Normal pulses.      Heart sounds: Normal heart sounds.   Pulmonary:      Effort: Pulmonary effort is normal.      Breath sounds: Normal breath sounds.   Abdominal:      General: Bowel sounds are normal.      Palpations: Abdomen is soft.   Musculoskeletal:         General: Normal range of motion.      Cervical back: Normal  range of motion.   Skin:     General: Skin is warm and dry.   Neurological:      General: No focal deficit present.      Mental Status: She is alert and oriented to person, place, and time.      Motor: Motor function is intact.   Psychiatric:         Attention and Perception: Attention and perception normal.         Mood and Affect: Mood and affect normal.         Speech: Speech normal.         Behavior: Behavior normal. Behavior is cooperative.         Thought Content: Thought content normal.         Cognition and Memory: Cognition and memory normal.     Labs and testing: Twelve-lead EKG reveals sinus rhythm without ectopics no acute ischemic changes.  QRS durations 90 ms,  ms, QTc 411 ms.  2D echocardiogram dated March 12, 2024 reveals an ejection fraction of 55 to 60%, trivial to 1+ MR and TR, right ventricular systolic pressure is 20 mmHg, trace to mild NE.      ASSESSMENT AND PLAN    Clinical impressions:  1.  Autonomic dysfunction with vasodepressor response to head up tilt table test with nitroglycerin provocation on midodrine.  2.  Postural orthostatic tachycardia syndrome.  3.  Dyslipidemia on statin.  4.  Diabetes mellitus type 2.  5.  Hypotension.  6.  Overweight with a BMI of 29.81.  7.  Gastroesophageal reflux disease.    Recommendations:  1.  Patient was instructed to increase the midodrine to 5 mg in the morning and 5 mg in the evening 2 hours before bedtime.  The midday dose will remain at 2.5 mg.  2.  Lifestyle modifications were discussed including increasing water consumption to 64 ounces per day and limiting caffeine consumption to 2 servings including coffee, tea, chocolate, pop.  3.  Follow-up in office with Dr. Simmons in 6 months or sooner if needed.  4.  The results of the head up tilt table test and 2D echocardiogram were reviewed with the patient      Evaluation and note by Stefanie Barnes CNP  **Please excuse any errors in grammar or translation related to this dictation.  Voice  recognition software was utilized to prepare this document.**

## 2024-04-02 NOTE — ED PROVIDER NOTES
[facility] ED  EMERGENCY DEPARTMENT ENCOUNTER      Pt Name: Symone Lazaro  MRN: [id number]  Birthdate 1975  Date of evaluation: 4/1/2024  Provider: Oriana Mariee PA-C  12:08 AM EDT      CHIEF COMPLAINT       Chief Complaint   Patient presents with    Fatigue     PT. Feels more tired than normal          HISTORY OF PRESENT ILLNESS   (Location/Symptom, Timing/Onset, Context/Setting, Quality, Duration, Modifying Factors, Severity)  Note limiting factors.   Symone Lazaro is a 49 y.o. female who presents to the emergency department with PMHx of POTS, type 2 diabetes and anxiety who presents emergency department with complaints of \"feeling lethargic\" and dizziness.  Patient states that she had taken her evening pills to sleep which do include Zanaflex and melatonin, and had stood up to go feed her cats and had complaints of dizziness and feeling as if she was going to pass out and \"overwhelming fatigue\".  Patient denies actually passing out.  Patient does state that this felt similar to her exacerbations in the past however it lasted longer than typical so she called EMS.  Patient denies any chest pain     HPI    Nursing Notes were reviewed.    REVIEW OF SYSTEMS    (2-9 systems for level 4, 10 or more for level 5)     Review of Systems   Constitutional:  Positive for fatigue. Negative for fever.   Respiratory:  Negative for chest tightness and shortness of breath.    Cardiovascular:  Negative for chest pain.   Gastrointestinal:  Negative for diarrhea and vomiting.   Genitourinary:  Negative for dysuria.   Neurological:  Positive for dizziness.   All other systems reviewed and are negative.      Except as noted above the remainder of the review of systems was reviewed and negative.       PAST MEDICAL HISTORY     Past Medical History:   Diagnosis Date    Anxiety     Chronic back pain     Chronic neck pain     Depression     Diabetes mellitus (HCC)     Hyperlipidemia     Hypertension     Osteoarthritis

## 2024-04-02 NOTE — DISCHARGE INSTRUCTIONS
Schedule follow-up with PCP as discussed.  Return to the emergency room for any new or worsening symptoms.

## 2024-04-05 LAB — 10OH-CARBAZEPINE SERPL-MCNC: 7 UG/ML (ref 3–35)

## 2024-04-08 ENCOUNTER — APPOINTMENT (OUTPATIENT)
Dept: CARDIOLOGY | Facility: HOSPITAL | Age: 49
End: 2024-04-08
Payer: MEDICARE

## 2024-05-02 DIAGNOSIS — G90.A POTS (POSTURAL ORTHOSTATIC TACHYCARDIA SYNDROME): ICD-10-CM

## 2024-05-02 RX ORDER — MIDODRINE HYDROCHLORIDE 2.5 MG/1
TABLET ORAL
Qty: 540 TABLET | Refills: 3 | Status: SHIPPED | OUTPATIENT
Start: 2024-05-02

## 2024-05-02 NOTE — TELEPHONE ENCOUNTER
Received request for prescription refills for patient.   Patient follows with Stefanie Watts/ Dr. Simmons    Request is for Midodrine  Is patient currently on medication yes    Last OV 4/2/24  Next OV 10/2/24    Pended for signing and sent to provider

## 2024-06-03 ENCOUNTER — OFFICE VISIT (OUTPATIENT)
Dept: PRIMARY CARE | Facility: CLINIC | Age: 49
End: 2024-06-03
Payer: MEDICARE

## 2024-06-03 VITALS
BODY MASS INDEX: 30.18 KG/M2 | TEMPERATURE: 98 F | SYSTOLIC BLOOD PRESSURE: 110 MMHG | DIASTOLIC BLOOD PRESSURE: 72 MMHG | WEIGHT: 165 LBS

## 2024-06-03 DIAGNOSIS — E11.9 TYPE 2 DIABETES MELLITUS WITHOUT COMPLICATIONS (MULTI): ICD-10-CM

## 2024-06-03 DIAGNOSIS — E11.9 DIABETES MELLITUS TYPE 2 WITHOUT RETINOPATHY (MULTI): ICD-10-CM

## 2024-06-03 DIAGNOSIS — E11.40 TYPE 2 DIABETES MELLITUS WITH DIABETIC NEUROPATHY, WITHOUT LONG-TERM CURRENT USE OF INSULIN (MULTI): Primary | ICD-10-CM

## 2024-06-03 PROCEDURE — 99213 OFFICE O/P EST LOW 20 MIN: CPT | Performed by: FAMILY MEDICINE

## 2024-06-03 PROCEDURE — 3008F BODY MASS INDEX DOCD: CPT | Performed by: FAMILY MEDICINE

## 2024-06-03 PROCEDURE — 4004F PT TOBACCO SCREEN RCVD TLK: CPT | Performed by: FAMILY MEDICINE

## 2024-06-03 PROCEDURE — 3044F HG A1C LEVEL LT 7.0%: CPT | Performed by: FAMILY MEDICINE

## 2024-06-03 PROCEDURE — 3074F SYST BP LT 130 MM HG: CPT | Performed by: FAMILY MEDICINE

## 2024-06-03 PROCEDURE — 3078F DIAST BP <80 MM HG: CPT | Performed by: FAMILY MEDICINE

## 2024-06-03 RX ORDER — SEMAGLUTIDE 2.68 MG/ML
2 INJECTION, SOLUTION SUBCUTANEOUS
Qty: 9 ML | Refills: 0 | Status: SHIPPED | OUTPATIENT
Start: 2024-06-03

## 2024-06-03 RX ORDER — ATORVASTATIN CALCIUM 40 MG/1
40 TABLET, FILM COATED ORAL NIGHTLY
Qty: 90 TABLET | Refills: 1 | Status: SHIPPED | OUTPATIENT
Start: 2024-06-03

## 2024-06-03 NOTE — PROGRESS NOTES
Subjective   Patient ID: [id number]     Bhavani Carr is a 49 y.o. female who presents for Follow-up and Med Refill.  HPI  She is here for a recheck and refills on medications.      She is on Ozempic.  She denies any side effects.      She is on pioglitazone.      Her last A1c in Feb was 5.6.    Cardiology started her on Midodine.    She has since had only one slight episode of dizziness.       Objective     /72 (BP Location: Left arm, Patient Position: Sitting)   Temp 36.7 °C (98 °F) (Skin)   Wt 74.8 kg (165 lb)   BMI 30.18 kg/m²      Physical Exam  Constitutional:       Appearance: Normal appearance.   Cardiovascular:      Rate and Rhythm: Normal rate and regular rhythm.      Pulses: Normal pulses.      Heart sounds: Normal heart sounds. No murmur heard.  Pulmonary:      Effort: Pulmonary effort is normal. No respiratory distress.      Breath sounds: Normal breath sounds.   Musculoskeletal:      Right lower leg: No edema.   Neurological:      General: No focal deficit present.      Mental Status: She is alert and oriented to person, place, and time.         Assessment/Plan   Problem List Items Addressed This Visit       Diabetes mellitus type 2 without retinopathy (Multi)    Relevant Medications    semaglutide (Ozempic) 2 mg/dose (8 mg/3 mL) pen injector    Type 2 diabetes mellitus with diabetic neuropathy, without long-term current use of insulin (Multi) - Primary     Other Visit Diagnoses       Type 2 diabetes mellitus without complications (Multi)        Relevant Medications    atorvastatin (Lipitor) 40 mg tablet          I will refill your Ozempic and atorvastatin.  You may stop the pioglitazone.  Return in three months.    Hossein Jimenez, DO

## 2024-06-03 NOTE — PATIENT INSTRUCTIONS
I will refill your Ozempic and atorvastatin.  You may stop the pioglitazone.  Return in three months.

## 2024-06-13 ENCOUNTER — TELEPHONE (OUTPATIENT)
Dept: PRIMARY CARE | Facility: CLINIC | Age: 49
End: 2024-06-13
Payer: MEDICARE

## 2024-06-13 DIAGNOSIS — E11.9 DIABETES MELLITUS TYPE 2 WITHOUT RETINOPATHY (MULTI): ICD-10-CM

## 2024-06-13 RX ORDER — SEMAGLUTIDE 2.68 MG/ML
2 INJECTION, SOLUTION SUBCUTANEOUS
Qty: 9 ML | Refills: 0 | Status: SHIPPED | OUTPATIENT
Start: 2024-06-13

## 2024-06-13 NOTE — TELEPHONE ENCOUNTER
You did just send in Ozempic 2mg for her but she is asking if you can send it to [facility].

## 2024-06-18 ENCOUNTER — HOSPITAL ENCOUNTER (EMERGENCY)
Facility: HOSPITAL | Age: 49
Discharge: HOME | End: 2024-06-18
Payer: MEDICARE

## 2024-06-18 ENCOUNTER — APPOINTMENT (OUTPATIENT)
Dept: RADIOLOGY | Facility: HOSPITAL | Age: 49
End: 2024-06-18
Payer: MEDICARE

## 2024-06-18 VITALS
DIASTOLIC BLOOD PRESSURE: 97 MMHG | HEIGHT: 62 IN | OXYGEN SATURATION: 96 % | BODY MASS INDEX: 28.89 KG/M2 | WEIGHT: 157 LBS | HEART RATE: 84 BPM | SYSTOLIC BLOOD PRESSURE: 153 MMHG | RESPIRATION RATE: 16 BRPM | TEMPERATURE: 97.9 F

## 2024-06-18 DIAGNOSIS — S93.602A FOOT SPRAIN, LEFT, INITIAL ENCOUNTER: Primary | ICD-10-CM

## 2024-06-18 PROCEDURE — 99283 EMERGENCY DEPT VISIT LOW MDM: CPT

## 2024-06-18 PROCEDURE — 73630 X-RAY EXAM OF FOOT: CPT | Mod: LT

## 2024-06-18 PROCEDURE — 73630 X-RAY EXAM OF FOOT: CPT | Mod: LEFT SIDE | Performed by: RADIOLOGY

## 2024-06-18 ASSESSMENT — LIFESTYLE VARIABLES
EVER FELT BAD OR GUILTY ABOUT YOUR DRINKING: NO
TOTAL SCORE: 0
HAVE YOU EVER FELT YOU SHOULD CUT DOWN ON YOUR DRINKING: NO
HAVE PEOPLE ANNOYED YOU BY CRITICIZING YOUR DRINKING: NO
EVER HAD A DRINK FIRST THING IN THE MORNING TO STEADY YOUR NERVES TO GET RID OF A HANGOVER: NO

## 2024-06-18 ASSESSMENT — COLUMBIA-SUICIDE SEVERITY RATING SCALE - C-SSRS
1. IN THE PAST MONTH, HAVE YOU WISHED YOU WERE DEAD OR WISHED YOU COULD GO TO SLEEP AND NOT WAKE UP?: NO
6. HAVE YOU EVER DONE ANYTHING, STARTED TO DO ANYTHING, OR PREPARED TO DO ANYTHING TO END YOUR LIFE?: NO
2. HAVE YOU ACTUALLY HAD ANY THOUGHTS OF KILLING YOURSELF?: NO

## 2024-06-18 ASSESSMENT — PAIN - FUNCTIONAL ASSESSMENT: PAIN_FUNCTIONAL_ASSESSMENT: 0-10

## 2024-06-18 NOTE — ED PROVIDER NOTES
HPI   Chief Complaint   Patient presents with    Ankle Pain     Pt fell today c/o left ankle pain         History provided by:  Patient   used: No         49-year-old female presents with left foot pain after she rolled her ankle as her flip-flop broke while she was walking down the stairs.  Hurts to ambulate and touch.  She took Aleve.  Denies  swelling, temp or sensation changes    ROS negative unless otherwise stated in HPI                 [place] Coma Scale Score: 15                     Patient History   Past Medical History:   Diagnosis Date    Hypermetropia, right eye 03/16/2017    Hyperopia of right eye with astigmatism and presbyopia    Hypermetropia, right eye 03/16/2017    Hyperopia of right eye with astigmatism and presbyopia    Hypermetropia, right eye 03/16/2017    Hyperopia of right eye with astigmatism and presbyopia    Hypermetropia, right eye 03/16/2017    Hyperopia of right eye with astigmatism and presbyopia    Personal history of diseases of the blood and blood-forming organs and certain disorders involving the immune mechanism 06/06/2015    History of leukocytosis    Personal history of diseases of the blood and blood-forming organs and certain disorders involving the immune mechanism 07/16/2020    History of leukocytosis    Personal history of other endocrine, nutritional and metabolic disease 07/18/2019    History of diabetes mellitus    Personal history of urinary (tract) infections 06/30/2015    History of urinary tract infection    S/P insertion of spinal cord stimulator     Unilateral primary osteoarthritis, right knee 12/20/2022    Right knee DJD     Past Surgical History:   Procedure Laterality Date    ANKLE SURGERY  12/04/2017    Ankle Surgery    CARPAL TUNNEL RELEASE Bilateral     CT ANGIO NECK  09/28/2022    CT NECK ANGIO W AND WO IV CONTRAST 9/28/2022    CT HEAD ANGIO W AND WO IV CONTRAST  09/28/2022    CT HEAD ANGIO W AND WO IV CONTRAST 9/28/2022    EYE SURGERY   04/17/2014    Eye Surgery    KNEE SURGERY  04/17/2014    Knee Surgery    OTHER SURGICAL HISTORY  04/17/2014    Ovarian Cystectomy    OTHER SURGICAL HISTORY  10/11/2019    Knee surgery    OTHER SURGICAL HISTORY  07/21/2020    Colposcopy    OTHER SURGICAL HISTORY  01/01/2023    Knee replacement partial    OTHER SURGICAL HISTORY  08/17/2020    Hysterectomy total    SPINAL CORD STIMULATOR IMPLANT      TUBAL LIGATION  02/27/2015    Tubal Ligation     Family History   Problem Relation Name Age of Onset    Diabetes Mother      Hypertension Mother      Stroke Mother      Coronary artery disease Father      Other (cardiac disorder) Father      Hypertension Sister      Other (pituitary tumor) Sister      Immunodeficiency Brother      Diabetes Mother's Sister      Breast cancer Father's Sister      Stroke Father's Sister      Diabetes Maternal Grandmother       Social History     Tobacco Use    Smoking status: Former     Types: Cigarettes    Smokeless tobacco: Current    Tobacco comments:     Vapes daily   Vaping Use    Vaping status: Every Day    Substances: Nicotine   Substance Use Topics    Alcohol use: Not Currently    Drug use: Not Currently     Types: Marijuana       Physical Exam   ED Triage Vitals [06/18/24 1632]   Temperature Heart Rate Respirations BP   36.6 °C (97.9 °F) 84 16 (!) 153/97      Pulse Ox Temp src Heart Rate Source Patient Position   96 % -- -- --      BP Location FiO2 (%)     -- --       Physical Exam    General: alert, no distress, well nourished, talking in full and complete sentences  Skin: dry, intact, no rashes  Head: atraumatic  Eyes: EOMI, PERRLA, normal conjunctiva  Throat: no stridor, no hot potato voice  Nose: nares patent  Mouth: mucous membranes moist  Respiratory: non labored breathing  Extremities: FROM X4, tender to left mid to distal fourth and fifth metatarsals, no tenderness to ankles or tib-fib, strength +5/5, pulses intact, capillary refill intact  Neuro: A&Ox3  Psych: cooperative,  appropriate mood      ED Course & MDM   Diagnoses as of 06/18/24 1823   Foot sprain, left, initial encounter   Labs Reviewed - No data to display   XR foot left 3+ views   Final Result   No abnormality in the left foot.             MACRO:   None        Signed by: Harsh Tyler 6/18/2024 5:42 PM   Dictation workstation:   [id number]            Medical Decision Making  I offered Toradol patient declines.  No acute findings on final read of x-ray.  She will be diagnosed with an ankle sprain.  I did offer crutches and she states that she has these at home.  I did offer an Ace wrap and she declines.  She states that she will follow-up with Ortho.  Afebrile, not tachycardic, not tachypneic, not hypoxic, tolerating PO, non toxic appearing at discharge. Hemodynamically intact. Patient instructed on return precautions. All questions answered. Patient in agreement with treatment.      Procedure  Procedures     Leeann Ibrahim PA-C  06/18/24 1824

## 2024-06-21 ENCOUNTER — OFFICE VISIT (OUTPATIENT)
Dept: ORTHOPEDIC SURGERY | Facility: CLINIC | Age: 49
End: 2024-06-21
Payer: MEDICARE

## 2024-06-21 DIAGNOSIS — S93.602A SPRAIN OF LEFT FOOT, INITIAL ENCOUNTER: Primary | ICD-10-CM

## 2024-06-21 PROCEDURE — 4004F PT TOBACCO SCREEN RCVD TLK: CPT | Performed by: STUDENT IN AN ORGANIZED HEALTH CARE EDUCATION/TRAINING PROGRAM

## 2024-06-21 PROCEDURE — 99214 OFFICE O/P EST MOD 30 MIN: CPT | Performed by: STUDENT IN AN ORGANIZED HEALTH CARE EDUCATION/TRAINING PROGRAM

## 2024-06-21 PROCEDURE — 3044F HG A1C LEVEL LT 7.0%: CPT | Performed by: STUDENT IN AN ORGANIZED HEALTH CARE EDUCATION/TRAINING PROGRAM

## 2024-06-21 PROCEDURE — 3008F BODY MASS INDEX DOCD: CPT | Performed by: STUDENT IN AN ORGANIZED HEALTH CARE EDUCATION/TRAINING PROGRAM

## 2024-06-21 PROCEDURE — L4361 PNEUMA/VAC WALK BOOT PRE OTS: HCPCS | Performed by: STUDENT IN AN ORGANIZED HEALTH CARE EDUCATION/TRAINING PROGRAM

## 2024-06-21 NOTE — PROGRESS NOTES
Acute Injury Established Patient Visit    HPI: Bhavani is a 49 y.o.female who presents today with new complaints of left foot injury.  She states that on Tuesday she slid down approximately 4 steps and slid into her cat litter box.  The dorsum of the foot has some bruising and swelling.  She has bruising and swelling into the middle 3 toes.  She denies any numbness and tingling.  She denies any pain about the ankle.  She has been walking on it, but has been primarily walking on her heel.  She has no pain over the great toe or medial aspect of the foot.  She has been using ice and Aleve.    Plan: For this left foot sprain and concern for occult fracture, we will place her in a short walking boot, and have her follow-up in 2 to 3 weeks with repeat x-rays of the left foot.  She can come out of the boot as she feels comfortable.  She can come out for skin care, shower, and rest.  She can work on some range of motion exercises when she is out of the boot.  Continue other conservative treatments as discussed for pain control.    Assessment:   Problem List Items Addressed This Visit    None  Visit Diagnoses       Sprain of left foot, initial encounter    -  Primary    Relevant Orders    Walking boot            Diagnostics: X-rays of the left foot reveal no obvious fracture or dislocation.      Procedure:  Procedures    Physical Exam:  GENERAL:  No obvious acute distress.  NEURO:  Distally neurovascularly intact.  Sensation intact to light touch.  Extremity: Left foot examination:  Skin is intact;  No erythema or warmth;  There is a 4 to 5 cm area of swelling directly overlying the dorsum of the lateral midfoot that is exquisitely tender to palpation, directly over the tarsometatarsal junctions, and is tender into the dorsum of the second, third, and fourth MTPs.  There is also overlying bruising in this region as well;  No obvious deformity;  No clinical signs of infection;  No pain over the base of the fifth metatarsal  bone;  TENDER in the midfoot;  TENDER over the third and fourth metatarsal bones;  No pain over the cuboid bone;  No pain over the calcaneus;  No pain over the plantar aponeurosis;  No pain over the proximal phalanx of the right great toe;  No pain over distal phalanx of the right great toe; and  Neurovascularly intact.      Orders Placed This Encounter    Walking boot      At the conclusion of the visit there were no further questions by the patient/family regarding their plan of care.  Patient was instructed to call or return with any issues, questions, or concerns regarding their injury and/or treatment plan described above.     06/21/24 at 10:28 AM - Mulugeta Wilcox,     Office: [phone]    This note was prepared using voice recognition software.  The details of this note are correct and have been reviewed, and corrected to the best of my ability.  Some grammatical errors may persist related to the Dragon software.

## 2024-06-27 ENCOUNTER — APPOINTMENT (OUTPATIENT)
Dept: RADIOLOGY | Facility: HOSPITAL | Age: 49
End: 2024-06-27
Payer: MEDICARE

## 2024-06-27 ENCOUNTER — HOSPITAL ENCOUNTER (OUTPATIENT)
Facility: HOSPITAL | Age: 49
Setting detail: OBSERVATION
Discharge: HOME | End: 2024-06-27
Attending: STUDENT IN AN ORGANIZED HEALTH CARE EDUCATION/TRAINING PROGRAM | Admitting: INTERNAL MEDICINE
Payer: MEDICARE

## 2024-06-27 VITALS
RESPIRATION RATE: 16 BRPM | HEART RATE: 73 BPM | DIASTOLIC BLOOD PRESSURE: 98 MMHG | HEIGHT: 62 IN | SYSTOLIC BLOOD PRESSURE: 159 MMHG | OXYGEN SATURATION: 98 % | TEMPERATURE: 98 F | BODY MASS INDEX: 28.89 KG/M2 | WEIGHT: 157 LBS

## 2024-06-27 DIAGNOSIS — R55 SYNCOPE, UNSPECIFIED SYNCOPE TYPE: Primary | ICD-10-CM

## 2024-06-27 DIAGNOSIS — G90.A POTS (POSTURAL ORTHOSTATIC TACHYCARDIA SYNDROME): ICD-10-CM

## 2024-06-27 LAB
ALBUMIN SERPL BCP-MCNC: 4.2 G/DL (ref 3.4–5)
ALP SERPL-CCNC: 78 U/L (ref 33–110)
ALT SERPL W P-5'-P-CCNC: 19 U/L (ref 7–45)
ANION GAP SERPL CALC-SCNC: 11 MMOL/L (ref 10–20)
ANION GAP SERPL CALC-SCNC: 12 MMOL/L (ref 10–20)
AST SERPL W P-5'-P-CCNC: 18 U/L (ref 9–39)
BASOPHILS # BLD AUTO: 0.06 X10*3/UL (ref 0–0.1)
BASOPHILS NFR BLD AUTO: 0.6 %
BILIRUB SERPL-MCNC: 0.3 MG/DL (ref 0–1.2)
BUN SERPL-MCNC: 10 MG/DL (ref 6–23)
BUN SERPL-MCNC: 12 MG/DL (ref 6–23)
CALCIUM SERPL-MCNC: 8.9 MG/DL (ref 8.6–10.3)
CALCIUM SERPL-MCNC: 9.1 MG/DL (ref 8.6–10.3)
CARDIAC TROPONIN I PNL SERPL HS: 10 NG/L (ref 0–13)
CARDIAC TROPONIN I PNL SERPL HS: 12 NG/L (ref 0–13)
CHLORIDE SERPL-SCNC: 101 MMOL/L (ref 98–107)
CHLORIDE SERPL-SCNC: 104 MMOL/L (ref 98–107)
CO2 SERPL-SCNC: 24 MMOL/L (ref 21–32)
CO2 SERPL-SCNC: 26 MMOL/L (ref 21–32)
CREAT SERPL-MCNC: 0.79 MG/DL (ref 0.5–1.05)
CREAT SERPL-MCNC: 1.01 MG/DL (ref 0.5–1.05)
EGFRCR SERPLBLD CKD-EPI 2021: 68 ML/MIN/1.73M*2
EGFRCR SERPLBLD CKD-EPI 2021: >90 ML/MIN/1.73M*2
EOSINOPHIL # BLD AUTO: 0.25 X10*3/UL (ref 0–0.7)
EOSINOPHIL NFR BLD AUTO: 2.6 %
ERYTHROCYTE [DISTWIDTH] IN BLOOD BY AUTOMATED COUNT: 11.9 % (ref 11.5–14.5)
ERYTHROCYTE [DISTWIDTH] IN BLOOD BY AUTOMATED COUNT: 11.9 % (ref 11.5–14.5)
GLUCOSE BLD MANUAL STRIP-MCNC: 112 MG/DL (ref 74–99)
GLUCOSE BLD MANUAL STRIP-MCNC: 93 MG/DL (ref 74–99)
GLUCOSE SERPL-MCNC: 125 MG/DL (ref 74–99)
GLUCOSE SERPL-MCNC: 249 MG/DL (ref 74–99)
HCT VFR BLD AUTO: 33.1 % (ref 36–46)
HCT VFR BLD AUTO: 35.2 % (ref 36–46)
HGB BLD-MCNC: 11.1 G/DL (ref 12–16)
HGB BLD-MCNC: 11.9 G/DL (ref 12–16)
HOLD SPECIMEN: NORMAL
IMM GRANULOCYTES # BLD AUTO: 0.02 X10*3/UL (ref 0–0.7)
IMM GRANULOCYTES NFR BLD AUTO: 0.2 % (ref 0–0.9)
LYMPHOCYTES # BLD AUTO: 2.21 X10*3/UL (ref 1.2–4.8)
LYMPHOCYTES NFR BLD AUTO: 22.7 %
MCH RBC QN AUTO: 31.8 PG (ref 26–34)
MCH RBC QN AUTO: 31.9 PG (ref 26–34)
MCHC RBC AUTO-ENTMCNC: 33.5 G/DL (ref 32–36)
MCHC RBC AUTO-ENTMCNC: 33.8 G/DL (ref 32–36)
MCV RBC AUTO: 94 FL (ref 80–100)
MCV RBC AUTO: 95 FL (ref 80–100)
MONOCYTES # BLD AUTO: 0.84 X10*3/UL (ref 0.1–1)
MONOCYTES NFR BLD AUTO: 8.6 %
NEUTROPHILS # BLD AUTO: 6.37 X10*3/UL (ref 1.2–7.7)
NEUTROPHILS NFR BLD AUTO: 65.3 %
NRBC BLD-RTO: 0 /100 WBCS (ref 0–0)
NRBC BLD-RTO: 0 /100 WBCS (ref 0–0)
PLATELET # BLD AUTO: 276 X10*3/UL (ref 150–450)
PLATELET # BLD AUTO: 307 X10*3/UL (ref 150–450)
POTASSIUM SERPL-SCNC: 3.6 MMOL/L (ref 3.5–5.3)
POTASSIUM SERPL-SCNC: 4 MMOL/L (ref 3.5–5.3)
PROT SERPL-MCNC: 7 G/DL (ref 6.4–8.2)
RBC # BLD AUTO: 3.49 X10*6/UL (ref 4–5.2)
RBC # BLD AUTO: 3.73 X10*6/UL (ref 4–5.2)
SODIUM SERPL-SCNC: 133 MMOL/L (ref 136–145)
SODIUM SERPL-SCNC: 137 MMOL/L (ref 136–145)
WBC # BLD AUTO: 7.9 X10*3/UL (ref 4.4–11.3)
WBC # BLD AUTO: 9.8 X10*3/UL (ref 4.4–11.3)

## 2024-06-27 PROCEDURE — 2500000001 HC RX 250 WO HCPCS SELF ADMINISTERED DRUGS (ALT 637 FOR MEDICARE OP)

## 2024-06-27 PROCEDURE — 2500000004 HC RX 250 GENERAL PHARMACY W/ HCPCS (ALT 636 FOR OP/ED): Performed by: PHYSICIAN ASSISTANT

## 2024-06-27 PROCEDURE — 2500000001 HC RX 250 WO HCPCS SELF ADMINISTERED DRUGS (ALT 637 FOR MEDICARE OP): Performed by: NURSE PRACTITIONER

## 2024-06-27 PROCEDURE — G0378 HOSPITAL OBSERVATION PER HR: HCPCS

## 2024-06-27 PROCEDURE — 84484 ASSAY OF TROPONIN QUANT: CPT | Mod: 91 | Performed by: PHYSICIAN ASSISTANT

## 2024-06-27 PROCEDURE — 85025 COMPLETE CBC W/AUTO DIFF WBC: CPT | Performed by: PHYSICIAN ASSISTANT

## 2024-06-27 PROCEDURE — 84075 ASSAY ALKALINE PHOSPHATASE: CPT | Performed by: PHYSICIAN ASSISTANT

## 2024-06-27 PROCEDURE — 85027 COMPLETE CBC AUTOMATED: CPT

## 2024-06-27 PROCEDURE — 96361 HYDRATE IV INFUSION ADD-ON: CPT

## 2024-06-27 PROCEDURE — 82374 ASSAY BLOOD CARBON DIOXIDE: CPT

## 2024-06-27 PROCEDURE — 36415 COLL VENOUS BLD VENIPUNCTURE: CPT | Performed by: PHYSICIAN ASSISTANT

## 2024-06-27 PROCEDURE — 71045 X-RAY EXAM CHEST 1 VIEW: CPT

## 2024-06-27 PROCEDURE — 82947 ASSAY GLUCOSE BLOOD QUANT: CPT | Mod: 91

## 2024-06-27 PROCEDURE — 99285 EMERGENCY DEPT VISIT HI MDM: CPT | Mod: 25

## 2024-06-27 PROCEDURE — 99239 HOSP IP/OBS DSCHRG MGMT >30: CPT | Performed by: STUDENT IN AN ORGANIZED HEALTH CARE EDUCATION/TRAINING PROGRAM

## 2024-06-27 PROCEDURE — 71045 X-RAY EXAM CHEST 1 VIEW: CPT | Performed by: STUDENT IN AN ORGANIZED HEALTH CARE EDUCATION/TRAINING PROGRAM

## 2024-06-27 PROCEDURE — 96360 HYDRATION IV INFUSION INIT: CPT

## 2024-06-27 PROCEDURE — 82947 ASSAY GLUCOSE BLOOD QUANT: CPT

## 2024-06-27 PROCEDURE — 84484 ASSAY OF TROPONIN QUANT: CPT | Performed by: PHYSICIAN ASSISTANT

## 2024-06-27 PROCEDURE — 2500000004 HC RX 250 GENERAL PHARMACY W/ HCPCS (ALT 636 FOR OP/ED)

## 2024-06-27 RX ORDER — ACETAMINOPHEN 500 MG
5 TABLET ORAL NIGHTLY PRN
Status: DISCONTINUED | OUTPATIENT
Start: 2024-06-27 | End: 2024-06-27 | Stop reason: HOSPADM

## 2024-06-27 RX ORDER — MIDODRINE HYDROCHLORIDE 5 MG/1
5 TABLET ORAL
Status: DISCONTINUED | OUTPATIENT
Start: 2024-06-27 | End: 2024-06-27 | Stop reason: HOSPADM

## 2024-06-27 RX ORDER — ONDANSETRON HYDROCHLORIDE 2 MG/ML
4 INJECTION, SOLUTION INTRAVENOUS EVERY 8 HOURS PRN
Status: DISCONTINUED | OUTPATIENT
Start: 2024-06-27 | End: 2024-06-27 | Stop reason: HOSPADM

## 2024-06-27 RX ORDER — POLYETHYLENE GLYCOL 3350 17 G/17G
17 POWDER, FOR SOLUTION ORAL DAILY
Status: DISCONTINUED | OUTPATIENT
Start: 2024-06-27 | End: 2024-06-27 | Stop reason: HOSPADM

## 2024-06-27 RX ORDER — DEXTROSE 50 % IN WATER (D50W) INTRAVENOUS SYRINGE
12.5
Status: DISCONTINUED | OUTPATIENT
Start: 2024-06-27 | End: 2024-06-27 | Stop reason: HOSPADM

## 2024-06-27 RX ORDER — INSULIN LISPRO 100 [IU]/ML
0-10 INJECTION, SOLUTION INTRAVENOUS; SUBCUTANEOUS
Status: DISCONTINUED | OUTPATIENT
Start: 2024-06-27 | End: 2024-06-27 | Stop reason: HOSPADM

## 2024-06-27 RX ORDER — SODIUM CHLORIDE, SODIUM LACTATE, POTASSIUM CHLORIDE, CALCIUM CHLORIDE 600; 310; 30; 20 MG/100ML; MG/100ML; MG/100ML; MG/100ML
100 INJECTION, SOLUTION INTRAVENOUS CONTINUOUS
Status: DISCONTINUED | OUTPATIENT
Start: 2024-06-27 | End: 2024-06-27 | Stop reason: HOSPADM

## 2024-06-27 RX ORDER — DEXTROSE 50 % IN WATER (D50W) INTRAVENOUS SYRINGE
25
Status: DISCONTINUED | OUTPATIENT
Start: 2024-06-27 | End: 2024-06-27 | Stop reason: HOSPADM

## 2024-06-27 RX ORDER — MIDODRINE HYDROCHLORIDE 5 MG/1
2.5 TABLET ORAL DAILY
Status: DISCONTINUED | OUTPATIENT
Start: 2024-06-27 | End: 2024-06-27

## 2024-06-27 RX ORDER — PANTOPRAZOLE SODIUM 40 MG/10ML
40 INJECTION, POWDER, LYOPHILIZED, FOR SOLUTION INTRAVENOUS DAILY
Status: DISCONTINUED | OUTPATIENT
Start: 2024-06-27 | End: 2024-06-27 | Stop reason: HOSPADM

## 2024-06-27 RX ORDER — PANTOPRAZOLE SODIUM 40 MG/1
40 TABLET, DELAYED RELEASE ORAL DAILY
Status: DISCONTINUED | OUTPATIENT
Start: 2024-06-27 | End: 2024-06-27 | Stop reason: HOSPADM

## 2024-06-27 RX ORDER — ONDANSETRON 4 MG/1
4 TABLET, ORALLY DISINTEGRATING ORAL EVERY 8 HOURS PRN
Status: DISCONTINUED | OUTPATIENT
Start: 2024-06-27 | End: 2024-06-27 | Stop reason: HOSPADM

## 2024-06-27 RX ORDER — ACETAMINOPHEN 160 MG/5ML
650 SOLUTION ORAL EVERY 4 HOURS PRN
Status: DISCONTINUED | OUTPATIENT
Start: 2024-06-27 | End: 2024-06-27 | Stop reason: HOSPADM

## 2024-06-27 RX ORDER — MIDODRINE HYDROCHLORIDE 5 MG/1
5 TABLET ORAL
Qty: 90 TABLET | Refills: 0 | Status: SHIPPED | OUTPATIENT
Start: 2024-06-27 | End: 2024-07-27

## 2024-06-27 RX ORDER — MIDODRINE HYDROCHLORIDE 5 MG/1
5 TABLET ORAL
Status: DISCONTINUED | OUTPATIENT
Start: 2024-06-27 | End: 2024-06-27

## 2024-06-27 RX ORDER — ACETAMINOPHEN 325 MG/1
650 TABLET ORAL EVERY 4 HOURS PRN
Status: DISCONTINUED | OUTPATIENT
Start: 2024-06-27 | End: 2024-06-27 | Stop reason: HOSPADM

## 2024-06-27 RX ORDER — ACETAMINOPHEN 650 MG/1
650 SUPPOSITORY RECTAL EVERY 4 HOURS PRN
Status: DISCONTINUED | OUTPATIENT
Start: 2024-06-27 | End: 2024-06-27 | Stop reason: HOSPADM

## 2024-06-27 SDOH — SOCIAL STABILITY: SOCIAL INSECURITY: ARE YOU OR HAVE YOU BEEN THREATENED OR ABUSED PHYSICALLY, EMOTIONALLY, OR SEXUALLY BY ANYONE?: NO

## 2024-06-27 SDOH — HEALTH STABILITY: MENTAL HEALTH: HOW MANY STANDARD DRINKS CONTAINING ALCOHOL DO YOU HAVE ON A TYPICAL DAY?: 1 OR 2

## 2024-06-27 SDOH — ECONOMIC STABILITY: HOUSING INSECURITY
IN THE LAST 12 MONTHS, WAS THERE A TIME WHEN YOU DID NOT HAVE A STEADY PLACE TO SLEEP OR SLEPT IN A SHELTER (INCLUDING NOW)?: NO

## 2024-06-27 SDOH — SOCIAL STABILITY: SOCIAL INSECURITY: DOES ANYONE TRY TO KEEP YOU FROM HAVING/CONTACTING OTHER FRIENDS OR DOING THINGS OUTSIDE YOUR HOME?: NO

## 2024-06-27 SDOH — SOCIAL STABILITY: SOCIAL INSECURITY: DO YOU FEEL ANYONE HAS EXPLOITED OR TAKEN ADVANTAGE OF YOU FINANCIALLY OR OF YOUR PERSONAL PROPERTY?: NO

## 2024-06-27 SDOH — SOCIAL STABILITY: SOCIAL INSECURITY: ARE THERE ANY APPARENT SIGNS OF INJURIES/BEHAVIORS THAT COULD BE RELATED TO ABUSE/NEGLECT?: NO

## 2024-06-27 SDOH — HEALTH STABILITY: MENTAL HEALTH: HOW OFTEN DO YOU HAVE 6 OR MORE DRINKS ON ONE OCCASION?: NEVER

## 2024-06-27 SDOH — ECONOMIC STABILITY: TRANSPORTATION INSECURITY
IN THE PAST 12 MONTHS, HAS THE LACK OF TRANSPORTATION KEPT YOU FROM MEDICAL APPOINTMENTS OR FROM GETTING MEDICATIONS?: NO

## 2024-06-27 SDOH — SOCIAL STABILITY: SOCIAL INSECURITY: DO YOU FEEL UNSAFE GOING BACK TO THE PLACE WHERE YOU ARE LIVING?: NO

## 2024-06-27 SDOH — ECONOMIC STABILITY: INCOME INSECURITY: HOW HARD IS IT FOR YOU TO PAY FOR THE VERY BASICS LIKE FOOD, HOUSING, MEDICAL CARE, AND HEATING?: NOT HARD AT ALL

## 2024-06-27 SDOH — SOCIAL STABILITY: SOCIAL INSECURITY: WERE YOU ABLE TO COMPLETE ALL THE BEHAVIORAL HEALTH SCREENINGS?: YES

## 2024-06-27 SDOH — ECONOMIC STABILITY: INCOME INSECURITY: IN THE LAST 12 MONTHS, WAS THERE A TIME WHEN YOU WERE NOT ABLE TO PAY THE MORTGAGE OR RENT ON TIME?: NO

## 2024-06-27 SDOH — SOCIAL STABILITY: SOCIAL INSECURITY: HAS ANYONE EVER THREATENED TO HURT YOUR FAMILY OR YOUR PETS?: NO

## 2024-06-27 SDOH — ECONOMIC STABILITY: HOUSING INSECURITY: IN THE LAST 12 MONTHS, HOW MANY PLACES HAVE YOU LIVED?: 1

## 2024-06-27 SDOH — SOCIAL STABILITY: SOCIAL INSECURITY: ABUSE: ADULT

## 2024-06-27 SDOH — HEALTH STABILITY: MENTAL HEALTH: HOW OFTEN DO YOU HAVE A DRINK CONTAINING ALCOHOL?: 2-3 TIMES A WEEK

## 2024-06-27 SDOH — ECONOMIC STABILITY: TRANSPORTATION INSECURITY
IN THE PAST 12 MONTHS, HAS LACK OF TRANSPORTATION KEPT YOU FROM MEETINGS, WORK, OR FROM GETTING THINGS NEEDED FOR DAILY LIVING?: NO

## 2024-06-27 SDOH — SOCIAL STABILITY: SOCIAL INSECURITY: HAVE YOU HAD ANY THOUGHTS OF HARMING ANYONE ELSE?: NO

## 2024-06-27 SDOH — SOCIAL STABILITY: SOCIAL INSECURITY: HAVE YOU HAD THOUGHTS OF HARMING ANYONE ELSE?: NO

## 2024-06-27 ASSESSMENT — LIFESTYLE VARIABLES
SKIP TO QUESTIONS 9-10: 1
AUDIT-C TOTAL SCORE: 3
AUDIT-C TOTAL SCORE: 3
HAVE YOU EVER FELT YOU SHOULD CUT DOWN ON YOUR DRINKING: NO
EVER FELT BAD OR GUILTY ABOUT YOUR DRINKING: NO
SKIP TO QUESTIONS 9-10: 1
HOW OFTEN DO YOU HAVE A DRINK CONTAINING ALCOHOL: 2-3 TIMES A WEEK
HOW OFTEN DO YOU HAVE 6 OR MORE DRINKS ON ONE OCCASION: NEVER
HAVE PEOPLE ANNOYED YOU BY CRITICIZING YOUR DRINKING: NO
TOTAL SCORE: 0
HOW MANY STANDARD DRINKS CONTAINING ALCOHOL DO YOU HAVE ON A TYPICAL DAY: 1 OR 2
EVER HAD A DRINK FIRST THING IN THE MORNING TO STEADY YOUR NERVES TO GET RID OF A HANGOVER: NO
AUDIT-C TOTAL SCORE: 3

## 2024-06-27 ASSESSMENT — COLUMBIA-SUICIDE SEVERITY RATING SCALE - C-SSRS
6. HAVE YOU EVER DONE ANYTHING, STARTED TO DO ANYTHING, OR PREPARED TO DO ANYTHING TO END YOUR LIFE?: NO
1. IN THE PAST MONTH, HAVE YOU WISHED YOU WERE DEAD OR WISHED YOU COULD GO TO SLEEP AND NOT WAKE UP?: NO
2. HAVE YOU ACTUALLY HAD ANY THOUGHTS OF KILLING YOURSELF?: NO

## 2024-06-27 ASSESSMENT — COGNITIVE AND FUNCTIONAL STATUS - GENERAL
MOBILITY SCORE: 22
MOBILITY SCORE: 22
CLIMB 3 TO 5 STEPS WITH RAILING: A LITTLE
DAILY ACTIVITIY SCORE: 24
WALKING IN HOSPITAL ROOM: A LITTLE
PATIENT BASELINE BEDBOUND: NO
DAILY ACTIVITIY SCORE: 24
WALKING IN HOSPITAL ROOM: A LITTLE
CLIMB 3 TO 5 STEPS WITH RAILING: A LITTLE

## 2024-06-27 ASSESSMENT — ACTIVITIES OF DAILY LIVING (ADL)
DRESSING YOURSELF: INDEPENDENT
LACK_OF_TRANSPORTATION: NO
HEARING - RIGHT EAR: FUNCTIONAL
PATIENT'S MEMORY ADEQUATE TO SAFELY COMPLETE DAILY ACTIVITIES?: YES
ASSISTIVE_DEVICE: EYEGLASSES;DENTURES PARTIAL
JUDGMENT_ADEQUATE_SAFELY_COMPLETE_DAILY_ACTIVITIES: YES
GROOMING: INDEPENDENT
HEARING - LEFT EAR: FUNCTIONAL
ADEQUATE_TO_COMPLETE_ADL: YES
TOILETING: INDEPENDENT
BATHING: INDEPENDENT
WALKS IN HOME: INDEPENDENT
FEEDING YOURSELF: INDEPENDENT

## 2024-06-27 ASSESSMENT — PATIENT HEALTH QUESTIONNAIRE - PHQ9
1. LITTLE INTEREST OR PLEASURE IN DOING THINGS: NOT AT ALL
2. FEELING DOWN, DEPRESSED OR HOPELESS: NOT AT ALL
SUM OF ALL RESPONSES TO PHQ9 QUESTIONS 1 & 2: 0

## 2024-06-27 ASSESSMENT — ENCOUNTER SYMPTOMS
LIGHT-HEADEDNESS: 1
DIZZINESS: 1

## 2024-06-27 ASSESSMENT — PAIN SCALES - GENERAL
PAINLEVEL_OUTOF10: 0 - NO PAIN
PAINLEVEL_OUTOF10: 0 - NO PAIN

## 2024-06-27 ASSESSMENT — PAIN - FUNCTIONAL ASSESSMENT: PAIN_FUNCTIONAL_ASSESSMENT: 0-10

## 2024-06-27 ASSESSMENT — PAIN SCALES - WONG BAKER: WONGBAKER_NUMERICALRESPONSE: NO HURT

## 2024-06-27 NOTE — DISCHARGE SUMMARY
Medical Group Discharge Summary  DISCHARGE DIAGNOSIS     Syncope    HOSPITAL COURSE AND DETAILS     Syncope  Hx of POTS  -Presented to the ER after syncopal episode. Chest xray negative. Lab work unremarkable. Vital signs stable.  -Was treated with IV fluids and continued on midodrine while in hospital. No further episodes of syncope/pre-syncope noted.  -Orthostatic vitals were negative.   -EP was consulted for evaluation. Case discussed with Meryl Fried CNP, Dosing of midodrine increased to 5 mg TID with life style modification discussed with patient/advised. Patient to be scheduled for close outpatient follow-up with EP on discharge.      Type 2 Diabetes Mellitus  GERD   -Home regimen resumed on discharge     35 minutes spent on discharge. Time calculated includes outpatient care coordination, bedside education, and counselling.         Humberto Purcell MD    DISCHARGE PHYSICAL EXAM     Last Recorded Vitals:  Vitals:    06/27/24 0732 06/27/24 1414 06/27/24 1417 06/27/24 1419   BP: 133/82 133/76 138/82 (!) 159/98   BP Location: Left arm Left arm Left arm Left arm   Patient Position: Lying Lying Sitting Standing   Pulse: 68 66 64 73   Resp: 16      Temp: 36.7 °C (98 °F)      TempSrc: Temporal      SpO2: 98%      Weight:       Height:           Physical Exam  General: Well-developed adult female in mild distress  HEENT: Clear sclera, EOMI, trachea midline, moist mucous membranes  Respiratory: Lungs clear to auscultation, with no wheezes or rhonchi appreciated  Cardiovascular: S1 and S2 auscultated, no murmurs clicks or rubs  Abdomen: Soft, nontender, nondistended  Extremities: No cyanosis or clubbing appreciated  Neurological: Spontaneously moves all extremities, no dysarthria, cranial nerves grossly intact  Psychiatric: Appropriate mood and affect  Skin: Warm, dry    DISCHARGE MEDICATIONS        Your medication list        CHANGE how you take these medications        Instructions Last Dose Given Next Dose Due    midodrine 5 mg tablet  Commonly known as: Proamatine  What changed:   medication strength  how much to take  how to take this  when to take this  additional instructions      Take 1 tablet (5 mg) by mouth 3 times a day before meals.              CONTINUE taking these medications        Instructions Last Dose Given Next Dose Due   ascorbic acid (vitamin C) 100 mg chewable tablet           atorvastatin 40 mg tablet  Commonly known as: Lipitor      Take 1 tablet (40 mg) by mouth once daily at bedtime.       biotin 10,000 mcg capsule           cholecalciferol 50 mcg (2,000 unit) capsule  Commonly known as: Vitamin D-3           cyanocobalamin 50 mcg tablet  Commonly known as: Vitamin B-12           Daily-Mala (with folic acid) tablet  Generic drug: multivitamin           EPINEPHrine 0.3 mg/0.3 mL injection syringe  Commonly known as: Epipen      as directed for acute allergic reaction       fLuvoxaMINE 25 mg tablet  Commonly known as: Luvox      Take 0.5 tablets (12.5 mg) by mouth once daily at bedtime.       medical cannabis           [id number]-[id number]-rhv-valer-hops-lm 0.15--200 mg capsule           omeprazole 10 mg DR capsule  Commonly known as: PriLOSEC      TAKE 1 CAPSULE BY MOUTH EVERY DAY       OneTouch Delica Plus Lancet 30 gauge misc  Generic drug: lancets      1 Units by Not Applicable route in the morning and 1 Units in the evening and 1 Units before bedtime. Please fill with lancets compatible with One Touch Verio Glucometer Kit..       OneTouch Verio Reflect Meter misc  Generic drug: blood-glucose meter           OneTouch Verio test strips strip  Generic drug: blood sugar diagnostic      USE TO TEST ONCE DAILY       OXcarbazepine 300 mg tablet  Commonly known as: Trileptal           Ozempic 2 mg/dose (8 mg/3 mL) pen injector  Generic drug: semaglutide      Inject 2 mg under the skin every 7 days.       prasterone (dhea) 10 mg tablet           tiZANidine 4 mg tablet  Commonly known as: Zanaflex                      Where to Get Your Medications        These medications were sent to [facility] - [place], [place] - [address]  [address], [facility] ([place] [address]      Phone: [phone]   midodrine 5 mg tablet           OUTPATIENT FOLLOW-UP     Future Appointments   Date Time Provider Department Center   7/12/2024 10:00 AM Mulugeta Wilcox DO [id number] [place]   7/16/2024  1:00 PM Willie Ruffin OD [id number] [place]   9/3/2024  3:00 PM Hossein Jimenez DO [id number] [place]   10/2/2024 12:30 PM Micheal Simmons MD [id number] [place]

## 2024-06-27 NOTE — ED PROVIDER NOTES
"HPI   Chief Complaint   Patient presents with   • Syncope     Pt arrived by squad after having an episode of \"blacking out\". States happens often with her POTS       This is a pleasant 49-year-old female who presents ER with complaint of a syncopal episode.  Patient states that she was laying in bed when she suddenly blacked out.  Patient states when she came to she called 911 and was brought to the hospital for further valuation.  Patient states she has a history of POTS and this happens often.  She denies any headache, chest pain, palpitations, cough, shortness of breath, nausea vomiting or diarrhea.  Patient states that she has been staying hydrated.  She had a positive tilt table test back in February 2024 and a normal SHARON.  She has a past medical history of POTS, ASCUS, insomnia, hyperopia of both eyes with astigmatism and presbyopia, LGSIL of cervix, OCD, anxiety disorder, shingles, sleep paralysis, elevated BMI, frequent UTIs, GERD, eczema, type 2 diabetes, carpal tunnel syndrome, fibromyalgia with chronic back pain, knee arthritis, anemia, depression.      History provided by:  Patient and medical records                      [place] Coma Scale Score: 15         NIH Stroke Scale: 0             Patient History   Past Medical History:   Diagnosis Date   • Hypermetropia, right eye 03/16/2017    Hyperopia of right eye with astigmatism and presbyopia   • Hypermetropia, right eye 03/16/2017    Hyperopia of right eye with astigmatism and presbyopia   • Hypermetropia, right eye 03/16/2017    Hyperopia of right eye with astigmatism and presbyopia   • Hypermetropia, right eye 03/16/2017    Hyperopia of right eye with astigmatism and presbyopia   • Personal history of diseases of the blood and blood-forming organs and certain disorders involving the immune mechanism 06/06/2015    History of leukocytosis   • Personal history of diseases of the blood and blood-forming organs and certain disorders involving the immune " mechanism 07/16/2020    History of leukocytosis   • Personal history of other endocrine, nutritional and metabolic disease 07/18/2019    History of diabetes mellitus   • Personal history of urinary (tract) infections 06/30/2015    History of urinary tract infection   • S/P insertion of spinal cord stimulator    • Unilateral primary osteoarthritis, right knee 12/20/2022    Right knee DJD     Past Surgical History:   Procedure Laterality Date   • ANKLE SURGERY  12/04/2017    Ankle Surgery   • CARPAL TUNNEL RELEASE Bilateral    • CT ANGIO NECK  09/28/2022    CT NECK ANGIO W AND WO IV CONTRAST 9/28/2022   • CT HEAD ANGIO W AND WO IV CONTRAST  09/28/2022    CT HEAD ANGIO W AND WO IV CONTRAST 9/28/2022   • EYE SURGERY  04/17/2014    Eye Surgery   • KNEE SURGERY  04/17/2014    Knee Surgery   • OTHER SURGICAL HISTORY  04/17/2014    Ovarian Cystectomy   • OTHER SURGICAL HISTORY  10/11/2019    Knee surgery   • OTHER SURGICAL HISTORY  07/21/2020    Colposcopy   • OTHER SURGICAL HISTORY  01/01/2023    Knee replacement partial   • OTHER SURGICAL HISTORY  08/17/2020    Hysterectomy total   • SPINAL CORD STIMULATOR IMPLANT     • TUBAL LIGATION  02/27/2015    Tubal Ligation     Family History   Problem Relation Name Age of Onset   • Diabetes Mother     • Hypertension Mother     • Stroke Mother     • Coronary artery disease Father     • Other (cardiac disorder) Father     • Hypertension Sister     • Other (pituitary tumor) Sister     • Immunodeficiency Brother     • Diabetes Mother's Sister     • Breast cancer Father's Sister     • Stroke Father's Sister     • Diabetes Maternal Grandmother       Social History     Tobacco Use   • Smoking status: Former     Types: Cigarettes   • Smokeless tobacco: Current   • Tobacco comments:     Vapes daily   Vaping Use   • Vaping status: Every Day   • Substances: Nicotine   Substance Use Topics   • Alcohol use: Not Currently   • Drug use: Not Currently     Types: Marijuana       Physical Exam   ED  Triage Vitals [06/27/24 0009]   Temperature Heart Rate Respirations BP   36.6 °C (97.9 °F) 59 16 175/76      Pulse Ox Temp Source Heart Rate Source Patient Position   99 % Temporal -- Sitting      BP Location FiO2 (%)     Left arm --       Physical Exam  Vitals and nursing note reviewed. Exam conducted with a chaperone present.   Constitutional:       Appearance: Normal appearance. She is normal weight. She is not ill-appearing.   HENT:      Head: Normocephalic and atraumatic.      Right Ear: Tympanic membrane, ear canal and external ear normal.      Left Ear: Tympanic membrane, ear canal and external ear normal.      Nose: Nose normal.      Mouth/Throat:      Mouth: Mucous membranes are moist.      Pharynx: Oropharynx is clear.   Eyes:      Extraocular Movements: Extraocular movements intact.      Conjunctiva/sclera: Conjunctivae normal.      Pupils: Pupils are equal, round, and reactive to light.   Neck:      Vascular: No carotid bruit.   Cardiovascular:      Rate and Rhythm: Normal rate and regular rhythm.      Pulses: Normal pulses.      Heart sounds: Normal heart sounds.   Pulmonary:      Effort: Pulmonary effort is normal.      Breath sounds: Normal breath sounds. No wheezing, rhonchi or rales.   Abdominal:      General: Abdomen is flat. Bowel sounds are normal.      Palpations: Abdomen is soft. There is no mass.      Tenderness: There is no abdominal tenderness. There is no guarding.   Musculoskeletal:         General: No swelling or tenderness. Normal range of motion.      Cervical back: Normal range of motion and neck supple.   Lymphadenopathy:      Cervical: No cervical adenopathy.   Skin:     General: Skin is warm and dry.      Capillary Refill: Capillary refill takes less than 2 seconds.      Findings: No rash.   Neurological:      General: No focal deficit present.      Mental Status: She is alert and oriented to person, place, and time. Mental status is at baseline.      GCS: GCS eye subscore is 4. GCS  verbal subscore is 5. GCS motor subscore is 6.      Cranial Nerves: No cranial nerve deficit or dysarthria.      Sensory: Sensation is intact.      Motor: Motor function is intact.      Coordination: Coordination is intact.   Psychiatric:         Mood and Affect: Mood normal.         Behavior: Behavior normal.         Thought Content: Thought content normal.         Judgment: Judgment normal.         ED Course & MDM   Diagnoses as of 06/27/24 0241   Syncope, unspecified syncope type       Medical Decision Making  Temperature 36.6 °C, heart rate 59, respirations 16, blood pressure 175/76, pulse ox is 99% on room air  EKG interpreted by me at 0034 hrs. sinus bradycardia rate 59 NH was 164 QRS was 90  QTc was 413 no ischemic changes.    CBC shows a white count of 9.8, hemoglobin 11.9, hematocrit is 35.2, platelet count was 307, metabolic glucose 249 sodium 133 otherwise within normal limits, troponin was negative at 12.  Patient's chest x-ray shows no acute cardiopulmonary process.  Orthostatic vital signs were reviewed blood pressure 131/73 heart rate 67 when lying down, sitting up /78 heart rate 57 standing blood pressure 7758 heart rate 71 feeling lightheaded near syncope.  At this time the plans with the patient hospital service for syncope with orthostatic hypotension.  Patient was accepted to Dr. Hong's service          Procedure  Procedures     Jerrell Martinez PA-C  06/27/24 0241

## 2024-06-27 NOTE — H&P
History Of Present Illness  Bhavani Carr is a 49 y.o. female with past medical history of POTS, GERD, type 2 diabetes mellitus, anemia, depression, fibromyalgia presented to the ER with syncopal episode. Patient states she was laying in the bed when she suddenly blacked out. She states this happens often due to her POTS. Patient states she has been taking her medicine as prescribed. She had a positive tilt table test in February and a normal SHARON. Patient denies chest pain, shortness of breath, nausea, vomiting, fever, chills, headache.    ER course: vital signs are temp 36.6, heart rate 71, respiratory rate 18, SPO2 98% room air. Lab work shows sodium 133, hgb 11.9, otherwise unremarkable. Chest xray is negative for acute process. Orthostats were done in the ER and upon standing patient's blood pressure was 77/58. She was given fluid in the ER. She will be admitted for further evaluation and treatment.      Past Medical History  She has a past medical history of Hypermetropia, right eye (03/16/2017), Hypermetropia, right eye (03/16/2017), Hypermetropia, right eye (03/16/2017), Hypermetropia, right eye (03/16/2017), Personal history of diseases of the blood and blood-forming organs and certain disorders involving the immune mechanism (06/06/2015), Personal history of diseases of the blood and blood-forming organs and certain disorders involving the immune mechanism (07/16/2020), Personal history of other endocrine, nutritional and metabolic disease (07/18/2019), Personal history of urinary (tract) infections (06/30/2015), S/P insertion of spinal cord stimulator, and Unilateral primary osteoarthritis, right knee (12/20/2022).    Surgical History  She has a past surgical history that includes Eye surgery (04/17/2014); Knee surgery (04/17/2014); Other surgical history (04/17/2014); Tubal ligation (02/27/2015); Ankle surgery (12/04/2017); Other surgical history (10/11/2019); Other surgical history (07/21/2020); Other  surgical history (01/01/2023); Other surgical history (08/17/2020); CT angio neck (09/28/2022); CT angio head w and wo IV contrast (09/28/2022); Carpal tunnel release (Bilateral); and Spinal cord stimulator implant.     Social History  She reports that she has quit smoking. Her smoking use included cigarettes. She uses smokeless tobacco. She reports that she does not currently use alcohol. She reports that she does not currently use drugs after having used the following drugs: Marijuana.    Family History  Family History   Problem Relation Name Age of Onset    Diabetes Mother      Hypertension Mother      Stroke Mother      Coronary artery disease Father      Other (cardiac disorder) Father      Hypertension Sister      Other (pituitary tumor) Sister      Immunodeficiency Brother      Diabetes Mother's Sister      Breast cancer Father's Sister      Stroke Father's Sister      Diabetes Maternal Grandmother          Allergies  Yellow jacket venom, Bee venom protein (honey bee), Fluzone quad 9591-0676 (pf) [flu vacc [id number] 6mos up(pf)], Guaifenesin, Guaifenex, Influenza virus vac. tri-split, Influenza virus vaccine whole, Metoprolol, Morphine, and Iodinated contrast media    Review of Systems   Neurological:  Positive for dizziness, syncope and light-headedness.   All other systems reviewed and are negative.       Physical Exam  Constitutional:       General: She is not in acute distress.  HENT:      Mouth/Throat:      Mouth: Mucous membranes are moist.   Eyes:      Pupils: Pupils are equal, round, and reactive to light.   Cardiovascular:      Rate and Rhythm: Normal rate and regular rhythm.      Pulses: Normal pulses.      Heart sounds: Normal heart sounds.   Pulmonary:      Effort: Pulmonary effort is normal.      Breath sounds: Normal breath sounds.   Abdominal:      General: Abdomen is flat. Bowel sounds are normal. There is no distension.      Palpations: Abdomen is soft.      Tenderness: There is no abdominal  tenderness.   Musculoskeletal:         General: Normal range of motion.   Skin:     General: Skin is warm and dry.      Capillary Refill: Capillary refill takes less than 2 seconds.   Neurological:      Mental Status: She is alert and oriented to person, place, and time.          Last Recorded Vitals  BP 77/58 (BP Location: Left arm, Patient Position: Standing)   Pulse 71   Temp 36.6 °C (97.9 °F) (Temporal)   Resp 18   Wt 71.2 kg (157 lb)   SpO2 98%     Relevant Results  CBC:   Results from last 7 days   Lab Units 06/27/24  0039   WBC AUTO x10*3/uL 9.8   RBC AUTO x10*6/uL 3.73*   HEMOGLOBIN g/dL 11.9*   HEMATOCRIT % 35.2*   MCV fL 94   MCH pg 31.9   MCHC g/dL 33.8   RDW % 11.9   PLATELETS AUTO x10*3/uL 307     CMP:    Results from last 7 days   Lab Units 06/27/24  0039   SODIUM mmol/L 133*   POTASSIUM mmol/L 4.0   CHLORIDE mmol/L 101   CO2 mmol/L 24   BUN mg/dL 12   CREATININE mg/dL 1.01   GLUCOSE mg/dL 249*   PROTEIN TOTAL g/dL 7.0   CALCIUM mg/dL 9.1   BILIRUBIN TOTAL mg/dL 0.3   ALK PHOS U/L 78   AST U/L 18   ALT U/L 19     BMP:    Results from last 7 days   Lab Units 06/27/24  0039   SODIUM mmol/L 133*   POTASSIUM mmol/L 4.0   CHLORIDE mmol/L 101   CO2 mmol/L 24   BUN mg/dL 12   CREATININE mg/dL 1.01   CALCIUM mg/dL 9.1   GLUCOSE mg/dL 249*     Troponin:    Results from last 7 days   Lab Units 06/27/24  0157 06/27/24  0039   TROPHS ng/L 10 12     Imagining  XR chest 1 view  Narrative: Interpreted By:  Moshe Clemons,   STUDY:  XR CHEST 1 VIEW;  6/27/2024 2:02 am      INDICATION:  Signs/Symptoms:syncope.      COMPARISON:  02/28/2017      ACCESSION NUMBER(S):  [id number]      ORDERING CLINICIAN:  ESDRAS GUTHRIE      FINDINGS:          The cardiomediastinal silhouette and pulmonary vasculature are within  normal limits. No consolidation, pleural effusion or pneumothorax.      Redemonstration of a left roots and leads coursing along the spine  from the left lower paraspinal region cranially beyond the  field of  view.      Impression: No acute cardiopulmonary process.          MACRO:  None.      Signed by: Moshe Clemons 6/27/2024 2:08 AM  Dictation workstation:   [id number]       Assessment/Plan   Principal Problem:    Syncope, unspecified syncope type    Syncope  Hx of POTS  -Presented to the ER after syncopal episode. Chest xray negative. Lab work unremarkable. Vital signs stable.  -IV fluids  -Continue Midodrine  -Consult Cardiology - Patient sees Dr. Simmons outpatient  -Meclizine as needed for dizziness  -Echo in AM    Type 2 Diabetes Mellitus   -Hold oral meds  -SSI, hypoglycemic protocol, Accuchecks ACHS    GERD  -Resume home meds    DVTp: Lovenox    PLAN: home    MARANDA Taylor-CNP    Plan of care was discussed extensively with patient.  Patient verbalized understanding through teach back method.  All question and concerns addressed upon examination.    Of note, this documentation is completed using the Dragon Dictation system (voice recognition software). There may be spelling and/or grammatical errors that were not corrected prior to final submission.

## 2024-06-27 NOTE — PROGRESS NOTES
06/27/24 1132   Discharge Planning   Living Arrangements Friends   Support Systems Friends/neighbors   Assistance Needed none   Type of Residence Private residence   Number of Stairs to Enter Residence 2   Number of Stairs Within Residence 0   Do you have animals or pets at home? No   Who is requesting discharge planning? Provider   Home or Post Acute Services None   Patient expects to be discharged to: Home   Does the patient need discharge transport arranged? No   Patient Choice   Provider Choice list and CMS website (https://medicare.gov/care-compare#search) for post-acute Quality and Resource Measure Data were provided and reviewed with: Patient   Patient / Family choosing to utilize agency / facility established prior to hospitalization No     Patient admitted from home with syncopal episode. Plan will likely be home, no needs, will wait for results of further testing, if negative, patient will discharge home. Will continue to follow.

## 2024-06-27 NOTE — PROGRESS NOTES
06/27/24 1533   Current Planned Discharge Disposition   Current Planned Discharge Disposition Home     Patient to discharge home today, no home needs identified.

## 2024-06-28 ENCOUNTER — PATIENT OUTREACH (OUTPATIENT)
Dept: CARDIOLOGY | Facility: CLINIC | Age: 49
End: 2024-06-28
Payer: MEDICARE

## 2024-06-28 ENCOUNTER — HOSPITAL ENCOUNTER (OUTPATIENT)
Dept: CARDIOLOGY | Facility: HOSPITAL | Age: 49
Discharge: HOME | End: 2024-06-28
Payer: MEDICARE

## 2024-06-28 PROCEDURE — 93005 ELECTROCARDIOGRAM TRACING: CPT

## 2024-06-28 NOTE — PROGRESS NOTES
Discharge Facility:  [facility]   Discharge Diagnosis: Syncope  Admission Date: 6/27/24  Discharge Date: 6/27/24--OBS    PCP Appointment Date: Dr Ferraro 9/3/24  Specialist Appointment Date:  Stefanie Barnes CNP 7-26-24--sending a task for a sooner appt, Ortho 7/12/24,  Hospital Encounter and Summary: Linked      Two attempts were made to reach patient within two business days after discharge. Voicemail left with contact information for patient to call back with any non-emergent questions or concerns.

## 2024-06-29 LAB
ATRIAL RATE: 59 BPM
P AXIS: 16 DEGREES
P OFFSET: 193 MS
P ONSET: 145 MS
PR INTERVAL: 164 MS
Q ONSET: 227 MS
QRS COUNT: 10 BEATS
QRS DURATION: 90 MS
QT INTERVAL: 418 MS
QTC CALCULATION(BAZETT): 413 MS
QTC FREDERICIA: 415 MS
R AXIS: -2 DEGREES
T AXIS: 16 DEGREES
T OFFSET: 436 MS
VENTRICULAR RATE: 59 BPM

## 2024-07-11 ENCOUNTER — PATIENT OUTREACH (OUTPATIENT)
Dept: CARDIOLOGY | Facility: CLINIC | Age: 49
End: 2024-07-11
Payer: MEDICARE

## 2024-07-11 DIAGNOSIS — E11.9 DIABETES MELLITUS TYPE 2 WITHOUT RETINOPATHY (MULTI): Primary | ICD-10-CM

## 2024-07-11 NOTE — PROGRESS NOTES
Unable to reach patient for call back after patient's follow up appointment with PCP.   MAURICEM with call back number for patient to call if needed   If no voicemail available call attempts x 2 were made to contact the patient to assist with any questions or concerns patient may have.

## 2024-07-12 ENCOUNTER — OFFICE VISIT (OUTPATIENT)
Dept: ORTHOPEDIC SURGERY | Facility: CLINIC | Age: 49
End: 2024-07-12
Payer: MEDICARE

## 2024-07-12 ENCOUNTER — HOSPITAL ENCOUNTER (OUTPATIENT)
Dept: RADIOLOGY | Facility: CLINIC | Age: 49
Discharge: HOME | End: 2024-07-12
Payer: MEDICARE

## 2024-07-12 DIAGNOSIS — S93.602A SPRAIN OF LEFT FOOT, INITIAL ENCOUNTER: ICD-10-CM

## 2024-07-12 PROCEDURE — 99213 OFFICE O/P EST LOW 20 MIN: CPT | Performed by: STUDENT IN AN ORGANIZED HEALTH CARE EDUCATION/TRAINING PROGRAM

## 2024-07-12 PROCEDURE — 73630 X-RAY EXAM OF FOOT: CPT | Mod: LT

## 2024-07-12 NOTE — PROGRESS NOTES
Acute Injury Established Patient Visit    HPI: Bhavani is a 49 y.o.female who presents today for follow-up of her left foot sprain and states that she is only somewhat improved as far as pain goes, but is still worried about the bruising in her foot.  Swelling has improved.  She denies any interval injuries.  She is  over the lateral aspect of the foot when she walks.    On 6/21/2024, she presented with new complaints of left foot injury.  She states that on Tuesday she slid down approximately 4 steps and slid into her cat litter box.  The dorsum of the foot has some bruising and swelling.  She has bruising and swelling into the middle 3 toes.  She denies any numbness and tingling.  She denies any pain about the ankle.  She has been walking on it, but has been primarily walking on her heel.  She has no pain over the great toe or medial aspect of the foot.  She has been using ice and Aleve.    Plan: Today, on 7/12/2024, we will continue in the boot for approximately  2 more weeks.  She states that she does not want a postop shoe as she feels more secure in the boot.    On 6/21/2024, for this left foot sprain and concern for occult fracture, we will place her in a short walking boot, and have her follow-up in 2 to 3 weeks with repeat x-rays of the left foot.  She can come out of the boot as she feels comfortable.  She can come out for skin care, shower, and rest.  She can work on some range of motion exercises when she is out of the boot.  Continue other conservative treatments as discussed for pain control.    Assessment:   Problem List Items Addressed This Visit    None  Visit Diagnoses       Sprain of left foot, initial encounter        Relevant Orders    XR foot left 3+ views            Diagnostics: X-rays of the left foot reveal no obvious fracture or dislocation.      Procedure:  Procedures    Physical Exam:  GENERAL:  No obvious acute distress.  NEURO:  Distally neurovascularly intact.  Sensation  intact to light touch.  Extremity: Left foot examination:  Skin is intact;  No erythema or warmth;  There is no longer a 4 to 5 cm area of swelling directly overlying the dorsum of the lateral midfoot that is mildly tender to palpation, directly over the tarsometatarsal junctions, and is tender into the dorsum of the second, third, and fourth MTPs.  There is also overlying bruising in this region as well, which has improved;  No obvious deformity;  No clinical signs of infection;  No pain over the base of the fifth metatarsal bone;  TENDER in the midfoot;  TENDER over the third and fourth metatarsal bones;  No pain over the cuboid bone;  No pain over the calcaneus;  No pain over the plantar aponeurosis;  No pain over the proximal phalanx of the right great toe;  No pain over distal phalanx of the right great toe; and  Neurovascularly intact.      Orders Placed This Encounter    XR foot left 3+ views      At the conclusion of the visit there were no further questions by the patient/family regarding their plan of care.  Patient was instructed to call or return with any issues, questions, or concerns regarding their injury and/or treatment plan described above.     07/12/24 at 12:44 PM - Mulugeta Wilcox, DO    Office: [phone]    This note was prepared using voice recognition software.  The details of this note are correct and have been reviewed, and corrected to the best of my ability.  Some grammatical errors may persist related to the Dragon software.

## 2024-07-16 ENCOUNTER — APPOINTMENT (OUTPATIENT)
Dept: OPHTHALMOLOGY | Facility: CLINIC | Age: 49
End: 2024-07-16
Payer: COMMERCIAL

## 2024-07-16 DIAGNOSIS — E11.9 DIABETES MELLITUS TYPE 2 WITHOUT RETINOPATHY (MULTI): ICD-10-CM

## 2024-07-16 DIAGNOSIS — H50.112 EXOTROPIA OF LEFT EYE: ICD-10-CM

## 2024-07-16 DIAGNOSIS — H04.123 DRY EYE SYNDROME OF BOTH EYES: ICD-10-CM

## 2024-07-16 DIAGNOSIS — Z98.890 HISTORY OF STRABISMUS SURGERY: ICD-10-CM

## 2024-07-16 DIAGNOSIS — H52.03 HYPEROPIA OF BOTH EYES WITH ASTIGMATISM AND PRESBYOPIA: Primary | ICD-10-CM

## 2024-07-16 DIAGNOSIS — H52.4 HYPEROPIA OF BOTH EYES WITH ASTIGMATISM AND PRESBYOPIA: Primary | ICD-10-CM

## 2024-07-16 DIAGNOSIS — H52.203 HYPEROPIA OF BOTH EYES WITH ASTIGMATISM AND PRESBYOPIA: Primary | ICD-10-CM

## 2024-07-16 PROCEDURE — 92015 DETERMINE REFRACTIVE STATE: CPT | Performed by: OPTOMETRIST

## 2024-07-16 PROCEDURE — 92014 COMPRE OPH EXAM EST PT 1/>: CPT | Performed by: OPTOMETRIST

## 2024-07-16 ASSESSMENT — TONOMETRY
IOP_METHOD: GOLDMANN APPLANATION
OD_IOP_MMHG: 20
OS_IOP_MMHG: 18

## 2024-07-16 ASSESSMENT — EXTERNAL EXAM - RIGHT EYE: OD_EXAM: NORMAL

## 2024-07-16 ASSESSMENT — ENCOUNTER SYMPTOMS
ENDOCRINE NEGATIVE: 0
EYES NEGATIVE: 1
MUSCULOSKELETAL NEGATIVE: 0
CARDIOVASCULAR NEGATIVE: 0
PSYCHIATRIC NEGATIVE: 0
CONSTITUTIONAL NEGATIVE: 0
NEUROLOGICAL NEGATIVE: 0
GASTROINTESTINAL NEGATIVE: 0
ALLERGIC/IMMUNOLOGIC NEGATIVE: 0
RESPIRATORY NEGATIVE: 0
HEMATOLOGIC/LYMPHATIC NEGATIVE: 0

## 2024-07-16 ASSESSMENT — VISUAL ACUITY
CORRECTION_TYPE: GLASSES
METHOD: SNELLEN - LINEAR
OS_CC: 20/25-2
OD_CC: 20/20-1

## 2024-07-16 ASSESSMENT — REFRACTION_MANIFEST
OS_AXIS: 090
OD_ADD: +2.00
OD_AXIS: 100
OD_SPHERE: +1.25
OS_SPHERE: +1.50
OS_ADD: +2.00
OS_CYLINDER: -1.50
OD_CYLINDER: -1.75

## 2024-07-16 ASSESSMENT — SLIT LAMP EXAM - LIDS
COMMENTS: GOOD POSITION
COMMENTS: GOOD POSITION

## 2024-07-16 ASSESSMENT — REFRACTION_WEARINGRX
OD_SPHERE: +0.75
OD_AXIS: 094
OS_SPHERE: +1.00
OS_CYLINDER: -1.50
OS_ADD: +2.00
OD_CYLINDER: -1.75
OS_AXIS: 083
OD_ADD: +2.00

## 2024-07-16 ASSESSMENT — CUP TO DISC RATIO
OD_RATIO: .3
OS_RATIO: .3

## 2024-07-16 ASSESSMENT — CONF VISUAL FIELD
METHOD: COUNTING FINGERS
OS_SUPERIOR_NASAL_RESTRICTION: 0
OD_SUPERIOR_NASAL_RESTRICTION: 0
OS_INFERIOR_TEMPORAL_RESTRICTION: 0
OS_NORMAL: 1
OS_INFERIOR_NASAL_RESTRICTION: 0
OD_SUPERIOR_TEMPORAL_RESTRICTION: 0
OD_INFERIOR_TEMPORAL_RESTRICTION: 0
OD_INFERIOR_NASAL_RESTRICTION: 0
OD_NORMAL: 1
OS_SUPERIOR_TEMPORAL_RESTRICTION: 0

## 2024-07-16 ASSESSMENT — EXTERNAL EXAM - LEFT EYE: OS_EXAM: NORMAL

## 2024-07-16 NOTE — PROGRESS NOTES
A spectacle prescription was given at the patient`s request. Minor change. Patient reads a lot and gets eyestrain. Recommend seperate reading glasses or an officelens to alleviate symptoms.     Mild NS and will monitor.     DMII No retinopathy. I discussed the value of good blood sugar control under your management and annual eye exams. The patient was asked to return to our clinic in one year or sooner if ocular or vision changes occur

## 2024-07-26 ENCOUNTER — APPOINTMENT (OUTPATIENT)
Dept: CARDIOLOGY | Facility: CLINIC | Age: 49
End: 2024-07-26
Payer: MEDICARE

## 2024-08-05 ENCOUNTER — APPOINTMENT (OUTPATIENT)
Dept: ORTHOPEDIC SURGERY | Facility: CLINIC | Age: 49
End: 2024-08-05
Payer: MEDICARE

## 2024-08-05 DIAGNOSIS — S93.602A SPRAIN OF LEFT FOOT, INITIAL ENCOUNTER: Primary | ICD-10-CM

## 2024-08-05 PROCEDURE — 3044F HG A1C LEVEL LT 7.0%: CPT | Performed by: STUDENT IN AN ORGANIZED HEALTH CARE EDUCATION/TRAINING PROGRAM

## 2024-08-05 PROCEDURE — 4004F PT TOBACCO SCREEN RCVD TLK: CPT | Performed by: STUDENT IN AN ORGANIZED HEALTH CARE EDUCATION/TRAINING PROGRAM

## 2024-08-05 PROCEDURE — 99213 OFFICE O/P EST LOW 20 MIN: CPT | Performed by: STUDENT IN AN ORGANIZED HEALTH CARE EDUCATION/TRAINING PROGRAM

## 2024-08-05 PROCEDURE — L3260 AMBULATORY SURGICAL BOOT EAC: HCPCS | Performed by: STUDENT IN AN ORGANIZED HEALTH CARE EDUCATION/TRAINING PROGRAM

## 2024-08-05 ASSESSMENT — PAIN - FUNCTIONAL ASSESSMENT: PAIN_FUNCTIONAL_ASSESSMENT: 0-10

## 2024-08-05 ASSESSMENT — PAIN SCALES - GENERAL: PAINLEVEL_OUTOF10: 4

## 2024-08-05 NOTE — PROGRESS NOTES
Acute Injury Established Patient Visit    HPI: Bhavani is a 49 y.o.female who presents today for follow-up of her left foot sprain.  She states that she is about 80% improved.  She still has a 4 out of 10 pain with certain movements.  She states that the swelling and bruising have vastly improved.  She states that she has been back to most of her normal activities.  She denies any interval injuries.    On 7/12/2024, she presented for follow-up of her left foot sprain and states that she is only somewhat improved as far as pain goes, but is still worried about the bruising in her foot.  Swelling has improved.  She denies any interval injuries.  She is  over the lateral aspect of the foot when she walks.    On 6/21/2024, she presented with new complaints of left foot injury.  She states that on Tuesday she slid down approximately 4 steps and slid into her cat litter box.  The dorsum of the foot has some bruising and swelling.  She has bruising and swelling into the middle 3 toes.  She denies any numbness and tingling.  She denies any pain about the ankle.  She has been walking on it, but has been primarily walking on her heel.  She has no pain over the great toe or medial aspect of the foot.  She has been using ice and Aleve.    Plan: Today, on 8/5/2024, we will transition her into a postop shoe for the next 7 to 10 days, and slowly wean out as she can tolerate, and get back to her normal activities.  Follow-up in 3 weeks to ensure that she continues to do well.    On 7/12/2024, we will continue in the boot for approximately  2 more weeks.  She states that she does not want a postop shoe as she feels more secure in the boot.    On 6/21/2024, for this left foot sprain and concern for occult fracture, we will place her in a short walking boot, and have her follow-up in 2 to 3 weeks with repeat x-rays of the left foot.  She can come out of the boot as she feels comfortable.  She can come out for skin care,  shower, and rest.  She can work on some range of motion exercises when she is out of the boot.  Continue other conservative treatments as discussed for pain control.    Assessment:   Problem List Items Addressed This Visit    None  Visit Diagnoses       Sprain of left foot, initial encounter    -  Primary    Relevant Orders    Post-op shoe              Diagnostics: X-rays of the left foot reveal no obvious fracture or dislocation.      Procedure:  Procedures    Physical Exam:  GENERAL:  No obvious acute distress.  NEURO:  Distally neurovascularly intact.  Sensation intact to light touch.  Extremity: Left foot examination:  Skin is intact;  No erythema or warmth;  No swelling or bruising;  Minimally tender over the proximal and middle phalanx of the third toe, but no other significant tenderness throughout the foot;  No obvious deformity;  No clinical signs of infection;  No pain over the base of the fifth metatarsal bone;  TENDER in the midfoot;  No longer significantly TENDER over the third and fourth metatarsal bones;  No pain over the cuboid bone;  No pain over the calcaneus;  No pain over the plantar aponeurosis;  No pain over the proximal phalanx of the right great toe;  No pain over distal phalanx of the right great toe; and  Neurovascularly intact.      Orders Placed This Encounter    Post-op shoe      At the conclusion of the visit there were no further questions by the patient/family regarding their plan of care.  Patient was instructed to call or return with any issues, questions, or concerns regarding their injury and/or treatment plan described above.     08/05/24 at 1:25 PM - Mulugeta Wilcox DO    Office: [phone]    This note was prepared using voice recognition software.  The details of this note are correct and have been reviewed, and corrected to the best of my ability.  Some grammatical errors may persist related to the Dragon software.

## 2024-08-14 ENCOUNTER — APPOINTMENT (OUTPATIENT)
Dept: CARDIOLOGY | Facility: CLINIC | Age: 49
End: 2024-08-14
Payer: MEDICARE

## 2024-08-14 VITALS
BODY MASS INDEX: 27.97 KG/M2 | WEIGHT: 152 LBS | SYSTOLIC BLOOD PRESSURE: 104 MMHG | HEIGHT: 62 IN | DIASTOLIC BLOOD PRESSURE: 58 MMHG | HEART RATE: 66 BPM

## 2024-08-14 DIAGNOSIS — Z87.891 FORMER SMOKER: ICD-10-CM

## 2024-08-14 DIAGNOSIS — G90.A POTS (POSTURAL ORTHOSTATIC TACHYCARDIA SYNDROME): ICD-10-CM

## 2024-08-14 DIAGNOSIS — R55 SYNCOPE, UNSPECIFIED SYNCOPE TYPE: ICD-10-CM

## 2024-08-14 DIAGNOSIS — R55 VASODEPRESSOR SYNCOPE: ICD-10-CM

## 2024-08-14 DIAGNOSIS — I95.1 AUTONOMIC ORTHOSTATIC HYPOTENSION: Primary | ICD-10-CM

## 2024-08-14 DIAGNOSIS — E11.9 DIABETES MELLITUS TYPE 2 WITHOUT RETINOPATHY (MULTI): ICD-10-CM

## 2024-08-14 PROCEDURE — 3078F DIAST BP <80 MM HG: CPT | Performed by: NURSE PRACTITIONER

## 2024-08-14 PROCEDURE — 99213 OFFICE O/P EST LOW 20 MIN: CPT | Performed by: NURSE PRACTITIONER

## 2024-08-14 PROCEDURE — 3074F SYST BP LT 130 MM HG: CPT | Performed by: NURSE PRACTITIONER

## 2024-08-14 PROCEDURE — 3008F BODY MASS INDEX DOCD: CPT | Performed by: NURSE PRACTITIONER

## 2024-08-14 PROCEDURE — 93000 ELECTROCARDIOGRAM COMPLETE: CPT | Performed by: INTERNAL MEDICINE

## 2024-08-14 PROCEDURE — 3044F HG A1C LEVEL LT 7.0%: CPT | Performed by: NURSE PRACTITIONER

## 2024-08-14 RX ORDER — MIDODRINE HYDROCHLORIDE 5 MG/1
5 TABLET ORAL
Qty: 90 TABLET | Refills: 3 | Status: SHIPPED | OUTPATIENT
Start: 2024-08-14 | End: 2025-08-14

## 2024-08-14 NOTE — PROGRESS NOTES
"CARDIOLOGY OFFICE VISIT      CHIEF COMPLAINT  Chief Complaint   Patient presents with    Follow-up     [facility] discharge   Chief complaint: \"I had an episode of POTS this morning.\"    HISTORY OF PRESENT ILLNESS  HPI  History: The patient is a 49-year-old  female who is followed for postural orthostatic tachycardia syndrome, autonomic dysfunction, and positive head up tilt table test with nitroglycerin provocation consistent with vasopressor syncope.  A 29 minutes into the test patient blood pressure dropped to 48/36 with a heart rate of 78.  She is maintained on midodrine.  She reports this morning she had an episode of POTS.  She states many of her episodes occurred during the nighttime hours.  She states that she just noted that her heart was beating hard.  She did experience some numbness in her arms as well.  She states last week she did have issues with diarrhea and did vomit twice.  She states that she has been increasing her water consumption and refraining from caffeine.  She denies chest pain, shortness of breath, or abdominal distention.  Additionally she denies near marlon syncope.  She is questioning when she can resume working out at the gym.  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    Hyperopia of right eye with astigmatism and presbyopia    Hypermetropia, right eye 03/16/2017    Hyperopia of right eye with astigmatism and presbyopia    Hypermetropia, right eye 03/16/2017    Hyperopia of right eye with astigmatism and presbyopia    Hypermetropia, right eye 03/16/2017    Hyperopia of right eye with astigmatism and presbyopia    Personal history of diseases of the blood and blood-forming organs and certain disorders involving the immune mechanism 06/06/2015    History of leukocytosis    Personal history of diseases of the blood and blood-forming organs and certain disorders involving the immune mechanism " 07/16/2020    History of leukocytosis    Personal history of other endocrine, nutritional and metabolic disease 07/18/2019    History of diabetes mellitus    Personal history of urinary (tract) infections 06/30/2015    History of urinary tract infection    S/P insertion of spinal cord stimulator     Unilateral primary osteoarthritis, right knee 12/20/2022    Right knee DJD       Social History  Social History     Tobacco Use    Smoking status: Former     Types: Cigarettes    Smokeless tobacco: Current    Tobacco comments:     Vapes daily   Vaping Use    Vaping status: Every Day    Substances: Nicotine   Substance Use Topics    Alcohol use: Not Currently    Drug use: Not Currently     Types: Marijuana       Family History     Family History   Problem Relation Name Age of Onset    Diabetes Mother      Hypertension Mother      Stroke Mother      Coronary artery disease Father      Other (cardiac disorder) Father      Hypertension Sister      Other (pituitary tumor) Sister      Immunodeficiency Brother      Diabetes Mother's Sister      Breast cancer Father's Sister      Stroke Father's Sister      Diabetes Maternal Grandmother          Allergies:  Allergies   Allergen Reactions    Yellow Jacket Venom Anaphylaxis    Bee Venom Protein (Honey Bee) Unknown    Fluzone Quad 6075-6282 (Pf) [Flu Vacc Cs9006-39 6mos Up(Pf)] Other     Deathly ill    Guaifenesin Unknown    Guaifenex Unknown    Influenza Virus Vac. Tri-Split Other    Influenza Virus Vaccine Whole Unknown    Lidocaine Other     Lidocaine injections - become partially paralyzed    Metoprolol Unknown    Morphine Unknown and Hives    Iodinated Contrast Media Nausea And Vomiting        Outpatient Medications:  Current Outpatient Medications   Medication Instructions    ascorbic acid, vitamin C, 100 mg chewable tablet oral    atorvastatin (LIPITOR) 40 mg, oral, Nightly    biotin 10,000 mcg capsule oral    cholecalciferol (Vitamin D-3) 50 mcg (2,000 unit) capsule oral     cyanocobalamin (Vitamin B-12) 50 mcg tablet oral    EPINEPHrine 0.3 mg/0.3 mL injection syringe as directed for acute allergic reaction    fLuvoxaMINE (LUVOX) 12.5 mg, oral, Nightly    medical cannabis 1 each, oral    udnvxbt-XOAK-lpo-valer-hops-lm 0.15--200 mg capsule 20 mg, oral    midodrine (PROAMATINE) 5 mg, oral, 3 times daily before meals    multivitamin with folic acid (Daily-Mala, with folic acid,) 400 mcg tablet 1 tablet, oral, Daily    omeprazole (PRILOSEC) 10 mg, oral, Daily    OneTouch Delica Plus Lancet 1 Units, Not Applicable, 3 times daily, Please fill with lancets compatible with One Touch Verio Glucometer Kit.    OneTouch Verio Reflect Meter misc USE AS DIRECTED TO MONITOR BLOOD GLUCOSE    OneTouch Verio test strips strip USE TO TEST ONCE DAILY    OXcarbazepine (TRILEPTAL) 300 mg, oral, Nightly    Ozempic 2 mg, subcutaneous, Every 7 days    prasterone, dhea, 10 mg tablet oral    tiZANidine (ZANAFLEX) 4 mg, oral, 4 times daily PRN          REVIEW OF SYSTEMS  Review of Systems   All other systems reviewed and are negative.        VITALS  Vitals:    08/14/24 1213   BP: 104/58   Pulse: 66       PHYSICAL EXAM  Vitals and nursing note reviewed.   Constitutional:       Appearance: Normal appearance.   HENT:      Head: Normocephalic.   Neck:      Vascular: No JVD. Carotid upstrokes II/IV.  Cardiovascular:      Rate and Rhythm: Normal rate and regular rhythm.      Pulses: Normal pulses.      Heart sounds: Normal S1 S2, no S3 S4.  No murmurs or rubs.  Pulmonary:      Effort: Pulmonary effort is normal. Respirations regular and nonlabored.     Breath sounds: Clear to auscultation posterior laterally.  Abdominal:      General: Bowel sounds are normal.      Palpations: Abdomen is soft.   Musculoskeletal:         General: Normal range of motion.      Cervical back: Normal range of motion.   Skin:     General: Skin is warm and dry.   Neurological:      General: No focal deficit present.      Mental Status:  Alert and oriented to person, place, and time.      Motor: Motor function is intact.   Psychiatric:         Attention and Perception: Attention and perception normal.         Mood and Affect: Mood and affect normal.         Speech: Speech normal.         Behavior: Behavior normal. Behavior is cooperative.         Thought Content: Thought content normal.         Cognition and Memory: Cognition and memory normal.     Labs and testing: Twelve-lead EKG reveals sinus rhythm without ectopics no acute ischemic changes.  QRS duration is 86 ms,  ms, QTc 444 ms.      ASSESSMENT AND PLAN    Clinical impressions:  1.  Autonomic dysfunction with vasodepressor response to head up tilt table test with nitroglycerin provocation on midodrine.  2.  Postural orthostatic tachycardia syndrome.  3.  Dyslipidemia on statin.  4.  Diabetes mellitus type 2.  5.  Hypotension.  6.  Overweight with a BMI of 27.80.  7.  Gastroesophageal reflux disease.     Recommendations:  1.  Continue current medications as prescribed.  2.  Continue lifestyle modifications as reviewed.  Patient states that she has a rare can of Coca-Cola.  She states other than that she is drinking water.  3.  Patient may resume exercising at the gym.  She needs to maintain adequate hydration while working out.  Discussed with patient this may help control her symptoms of POTS as well.  4.  Cancel the office visit with Dr. Simmons scheduled for October 2, 2024 and follow-up in office in 6 months with Dr. Simmons or sooner if needed.    Evaluation and note by Stefanie Barnes CNP  **Please excuse any errors in grammar or translation related to this dictation.  Voice recognition software was utilized to prepare this document.**

## 2024-08-20 ENCOUNTER — TELEMEDICINE (OUTPATIENT)
Dept: PRIMARY CARE | Facility: CLINIC | Age: 49
End: 2024-08-20
Payer: COMMERCIAL

## 2024-08-20 DIAGNOSIS — E11.9 DIABETES MELLITUS TYPE 2 WITHOUT RETINOPATHY (MULTI): Primary | ICD-10-CM

## 2024-08-20 DIAGNOSIS — E66.3 OVERWEIGHT (BMI 25.0-29.9): ICD-10-CM

## 2024-08-20 PROCEDURE — 4004F PT TOBACCO SCREEN RCVD TLK: CPT | Performed by: FAMILY MEDICINE

## 2024-08-20 PROCEDURE — 99213 OFFICE O/P EST LOW 20 MIN: CPT | Performed by: FAMILY MEDICINE

## 2024-08-20 PROCEDURE — 3044F HG A1C LEVEL LT 7.0%: CPT | Performed by: FAMILY MEDICINE

## 2024-08-20 RX ORDER — ONDANSETRON 4 MG/1
4 TABLET, FILM COATED ORAL EVERY 8 HOURS PRN
Qty: 20 TABLET | Refills: 0 | Status: SHIPPED | OUTPATIENT
Start: 2024-08-20 | End: 2024-08-27

## 2024-08-20 NOTE — PATIENT INSTRUCTIONS
I lowered your dose of Ozempic and prescribed ondansetron for nausea.  Return in one month for a recheck and sooner if you feel any worse.

## 2024-08-20 NOTE — PROGRESS NOTES
Subjective   Patient ID: [id number]   Virtual or Telephone Consent    An interactive audio and video telecommunication system which permits real time communications between the patient (at the originating site) and provider (at the distant site) was utilized to provide this telehealth service.     Verbal consent was requested and obtained from Bhavani Carr on this date, 08/20/24 for a telehealth visit.     Bhavani Carr is a 49 y.o. female who presents for nausea. She thinks it may be from the Ozempic.  She has been at the high dose of the Ozempic.      Her weight is down 13 more pounds from where she was last visit She has lost a total of 46 pounds since starting Ozempic.            HPI        Objective     There were no vitals taken for this visit.     Physical Exam  Constitutional:       Appearance: Normal appearance.   Pulmonary:      Effort: No respiratory distress.      Breath sounds: Normal breath sounds.   Neurological:      Mental Status: She is alert.         Assessment/Plan   Problem List Items Addressed This Visit       Diabetes mellitus type 2 without retinopathy (Multi) - Primary    Relevant Medications    semaglutide (OZEMPIC) 1 mg/dose (4 mg/3 mL) pen injector (Start on 8/25/2024)    ondansetron (Zofran) 4 mg tablet     Other Visit Diagnoses       Overweight (BMI 25.0-29.9)        Relevant Medications    semaglutide (OZEMPIC) 1 mg/dose (4 mg/3 mL) pen injector (Start on 8/25/2024)    ondansetron (Zofran) 4 mg tablet          I lowered your dose of Ozempic and prescribed ondansetron for nausea.  Return in one month for a recheck and sooner if you feel any worse.   Hossein Jimenez, DO

## 2024-08-23 DIAGNOSIS — G90.A POTS (POSTURAL ORTHOSTATIC TACHYCARDIA SYNDROME): ICD-10-CM

## 2024-08-23 RX ORDER — MIDODRINE HYDROCHLORIDE 5 MG/1
5 TABLET ORAL
Qty: 270 TABLET | Refills: 1 | Status: SHIPPED | OUTPATIENT
Start: 2024-08-23

## 2024-08-23 NOTE — TELEPHONE ENCOUNTER
Received request for prescription refills for patient.   Patient follows with Dr. Simmons     Request is for Midodrine 5mg TID  Is patient currently on medication yes    Last OV 8/14/24  Next OV 2/5/25    Pended for signing and sent to provider

## 2024-08-28 ENCOUNTER — APPOINTMENT (OUTPATIENT)
Dept: ORTHOPEDIC SURGERY | Facility: CLINIC | Age: 49
End: 2024-08-28
Payer: MEDICARE

## 2024-08-28 VITALS — HEIGHT: 62 IN | BODY MASS INDEX: 27.97 KG/M2 | WEIGHT: 152 LBS

## 2024-08-28 DIAGNOSIS — S93.602A SPRAIN OF LEFT FOOT, INITIAL ENCOUNTER: Primary | ICD-10-CM

## 2024-08-28 PROCEDURE — 3044F HG A1C LEVEL LT 7.0%: CPT | Performed by: STUDENT IN AN ORGANIZED HEALTH CARE EDUCATION/TRAINING PROGRAM

## 2024-08-28 PROCEDURE — 99213 OFFICE O/P EST LOW 20 MIN: CPT | Performed by: STUDENT IN AN ORGANIZED HEALTH CARE EDUCATION/TRAINING PROGRAM

## 2024-08-28 PROCEDURE — 3008F BODY MASS INDEX DOCD: CPT | Performed by: STUDENT IN AN ORGANIZED HEALTH CARE EDUCATION/TRAINING PROGRAM

## 2024-08-28 PROCEDURE — 4004F PT TOBACCO SCREEN RCVD TLK: CPT | Performed by: STUDENT IN AN ORGANIZED HEALTH CARE EDUCATION/TRAINING PROGRAM

## 2024-08-28 NOTE — PROGRESS NOTES
Acute Injury Established Patient Visit    HPI: Bhavani is a 49 y.o.female who presents today for follow-up of her left foot sprain.  He states that she is 98% better.  She still has an occasional dull ache intermittently, but this is not preventing her from doing her normal activities.  She denies any interval falls or injuries.    On 8/5/2024, she presented for follow-up of her left foot sprain.  She states that she is about 80% improved.  She still has a 4 out of 10 pain with certain movements.  She states that the swelling and bruising have vastly improved.  She states that she has been back to most of her normal activities.  She denies any interval injuries.    On 7/12/2024, she presented for follow-up of her left foot sprain and states that she is only somewhat improved as far as pain goes, but is still worried about the bruising in her foot.  Swelling has improved.  She denies any interval injuries.  She is  over the lateral aspect of the foot when she walks.    On 6/21/2024, she presented with new complaints of left foot injury.  She states that on Tuesday she slid down approximately 4 steps and slid into her cat litter box.  The dorsum of the foot has some bruising and swelling.  She has bruising and swelling into the middle 3 toes.  She denies any numbness and tingling.  She denies any pain about the ankle.  She has been walking on it, but has been primarily walking on her heel.  She has no pain over the great toe or medial aspect of the foot.  She has been using ice and Aleve.    Plan: Today, on 8/28/2024, she can return to activities as tolerated and follow-up as needed.    On 8/5/2024, we will transition her into a postop shoe for the next 7 to 10 days, and slowly wean out as she can tolerate, and get back to her normal activities.  Follow-up in 3 weeks to ensure that she continues to do well.    On 7/12/2024, we will continue in the boot for approximately  2 more weeks.  She states that she  does not want a postop shoe as she feels more secure in the boot.    On 6/21/2024, for this left foot sprain and concern for occult fracture, we will place her in a short walking boot, and have her follow-up in 2 to 3 weeks with repeat x-rays of the left foot.  She can come out of the boot as she feels comfortable.  She can come out for skin care, shower, and rest.  She can work on some range of motion exercises when she is out of the boot.  Continue other conservative treatments as discussed for pain control.    Assessment:   Problem List Items Addressed This Visit    None  Visit Diagnoses       Sprain of left foot, initial encounter    -  Primary                Diagnostics: X-rays of the left foot reveal no obvious fracture or dislocation.      Procedure:  Procedures    Physical Exam:  GENERAL:  No obvious acute distress.  NEURO:  Distally neurovascularly intact.  Sensation intact to light touch.  Extremity: Left foot examination:  Skin is intact;  No erythema or warmth;  No swelling or bruising;  Minimally tender over the proximal and middle phalanx of the third toe, but no other significant tenderness throughout the foot;  No obvious deformity;  No clinical signs of infection;  No pain over the base of the fifth metatarsal bone;  Very minimally TENDER in the midfoot;  No longer significantly TENDER over the third and fourth metatarsal bones;  No pain over the cuboid bone;  No pain over the calcaneus;  No pain over the plantar aponeurosis;  No pain over the proximal phalanx of the right great toe;  No pain over distal phalanx of the right great toe; and  Neurovascularly intact.      No orders of the defined types were placed in this encounter.     At the conclusion of the visit there were no further questions by the patient/family regarding their plan of care.  Patient was instructed to call or return with any issues, questions, or concerns regarding their injury and/or treatment plan described above.      08/28/24 at 1:22 PM - Mulugeta Wilcox, DO    Office: [phone]    This note was prepared using voice recognition software.  The details of this note are correct and have been reviewed, and corrected to the best of my ability.  Some grammatical errors may persist related to the Dragon software.

## 2024-09-03 ENCOUNTER — APPOINTMENT (OUTPATIENT)
Dept: PRIMARY CARE | Facility: CLINIC | Age: 49
End: 2024-09-03
Payer: MEDICARE

## 2024-09-03 VITALS
TEMPERATURE: 97.6 F | BODY MASS INDEX: 27.44 KG/M2 | DIASTOLIC BLOOD PRESSURE: 80 MMHG | WEIGHT: 150 LBS | SYSTOLIC BLOOD PRESSURE: 118 MMHG

## 2024-09-03 DIAGNOSIS — E11.40 TYPE 2 DIABETES MELLITUS WITH DIABETIC NEUROPATHY, WITHOUT LONG-TERM CURRENT USE OF INSULIN (MULTI): ICD-10-CM

## 2024-09-03 DIAGNOSIS — Q79.60 EHLERS-DANLOS SYNDROME (HHS-HCC): Primary | ICD-10-CM

## 2024-09-03 DIAGNOSIS — G90.A POTS (POSTURAL ORTHOSTATIC TACHYCARDIA SYNDROME): ICD-10-CM

## 2024-09-03 PROCEDURE — 99213 OFFICE O/P EST LOW 20 MIN: CPT | Performed by: FAMILY MEDICINE

## 2024-09-03 PROCEDURE — 3044F HG A1C LEVEL LT 7.0%: CPT | Performed by: FAMILY MEDICINE

## 2024-09-03 PROCEDURE — 3074F SYST BP LT 130 MM HG: CPT | Performed by: FAMILY MEDICINE

## 2024-09-03 PROCEDURE — 3079F DIAST BP 80-89 MM HG: CPT | Performed by: FAMILY MEDICINE

## 2024-09-03 NOTE — PROGRESS NOTES
Subjective   Patient ID: [id number]     Bhavani Carr is a 49 y.o. female who presents for Follow-up and Med Refill.  Med Refill        She is here for a recheck.  She was decreased from 2 mg weekly to 1 mg weekly on Ozempic due to nausea and vomiting.  She is tolerating the 1 mg weekly dose without problems.  No nausea or vomiting.  She can eat ok but her appetite is diminished.      No side effects to the Ozempic right now.  No constipation, diarrhea or abdominal pain.    She is asking for a geneiticist referral.  She has very elastic skin.  She has POTS.  She is concerned about Cierra Danlos.    Objective     /80 (BP Location: Left arm, Patient Position: Sitting)   Temp 36.4 °C (97.6 °F) (Skin)   Wt 68 kg (150 lb)   BMI 27.44 kg/m²      Physical Exam  Constitutional:       Appearance: Normal appearance.   Cardiovascular:      Rate and Rhythm: Normal rate and regular rhythm.      Heart sounds: Normal heart sounds. No murmur heard.  Pulmonary:      Effort: Pulmonary effort is normal. No respiratory distress.      Breath sounds: Normal breath sounds.   Abdominal:      General: Abdomen is flat.      Palpations: Abdomen is soft.      Tenderness: There is no abdominal tenderness. There is no guarding or rebound.   Skin:     Comments: Mildly more elastic than normal for people.  Possible Cierra Danlos.   Neurological:      General: No focal deficit present.      Mental Status: She is alert and oriented to person, place, and time.         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  I will refer you to genetics.  Return in three months for a recheck. Continue with losing weight.  You are almost at your goal weight (BMI of 25 or  less).  Hossein Jimenez, DO

## 2024-09-04 ENCOUNTER — PATIENT MESSAGE (OUTPATIENT)
Dept: GENETICS | Facility: CLINIC | Age: 49
End: 2024-09-04
Payer: MEDICARE

## 2024-09-24 DIAGNOSIS — F42.9 OBSESSIVE-COMPULSIVE DISORDER, UNSPECIFIED TYPE: ICD-10-CM

## 2024-09-24 RX ORDER — FLUVOXAMINE MALEATE 25 MG/1
TABLET ORAL
Qty: 45 TABLET | Refills: 1 | Status: SHIPPED | OUTPATIENT
Start: 2024-09-24

## 2024-10-02 ENCOUNTER — APPOINTMENT (OUTPATIENT)
Dept: CARDIOLOGY | Facility: CLINIC | Age: 49
End: 2024-10-02
Payer: MEDICARE

## 2024-10-14 DIAGNOSIS — E66.3 OVERWEIGHT (BMI 25.0-29.9): ICD-10-CM

## 2024-10-14 DIAGNOSIS — E11.9 DIABETES MELLITUS TYPE 2 WITHOUT RETINOPATHY (MULTI): ICD-10-CM

## 2024-10-14 RX ORDER — SEMAGLUTIDE 1.34 MG/ML
1 INJECTION, SOLUTION SUBCUTANEOUS
Qty: 2 ML | Refills: 1 | Status: SHIPPED | OUTPATIENT
Start: 2024-10-14 | End: 2024-10-14 | Stop reason: SDUPTHER

## 2024-10-14 RX ORDER — SEMAGLUTIDE 1.34 MG/ML
1 INJECTION, SOLUTION SUBCUTANEOUS
Qty: 3 ML | Refills: 1 | Status: SHIPPED | OUTPATIENT
Start: 2024-10-14 | End: 2024-10-14 | Stop reason: SDUPTHER

## 2024-10-14 RX ORDER — SEMAGLUTIDE 1.34 MG/ML
1 INJECTION, SOLUTION SUBCUTANEOUS
Qty: 6 ML | Refills: 1 | Status: SHIPPED | OUTPATIENT
Start: 2024-10-14
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
## 2024-11-22 ENCOUNTER — EVALUATION (OUTPATIENT)
Dept: PHYSICAL THERAPY | Facility: CLINIC | Age: 49
End: 2024-11-22
Payer: MEDICARE

## 2024-11-22 DIAGNOSIS — M62.830 PARASPINAL MUSCLE SPASM: Primary | ICD-10-CM

## 2024-11-22 PROCEDURE — 97161 PT EVAL LOW COMPLEX 20 MIN: CPT | Mod: GP | Performed by: PHYSICAL THERAPIST

## 2024-11-22 PROCEDURE — 97110 THERAPEUTIC EXERCISES: CPT | Mod: GP | Performed by: PHYSICAL THERAPIST

## 2024-11-22 ASSESSMENT — PAIN DESCRIPTION - DESCRIPTORS: DESCRIPTORS: TIGHTNESS

## 2024-11-22 ASSESSMENT — PAIN - FUNCTIONAL ASSESSMENT: PAIN_FUNCTIONAL_ASSESSMENT: 0-10

## 2024-11-22 ASSESSMENT — PAIN SCALES - GENERAL: PAINLEVEL_OUTOF10: 4

## 2024-11-22 ASSESSMENT — ENCOUNTER SYMPTOMS
LOSS OF SENSATION IN FEET: 0
DEPRESSION: 0
OCCASIONAL FEELINGS OF UNSTEADINESS: 0

## 2024-12-03 ENCOUNTER — APPOINTMENT (OUTPATIENT)
Dept: PRIMARY CARE | Facility: CLINIC | Age: 49
End: 2024-12-03
Payer: MEDICARE

## 2024-12-03 VITALS
WEIGHT: 154 LBS | TEMPERATURE: 96.8 F | BODY MASS INDEX: 28.17 KG/M2 | SYSTOLIC BLOOD PRESSURE: 96 MMHG | DIASTOLIC BLOOD PRESSURE: 60 MMHG

## 2024-12-03 DIAGNOSIS — F42.9 OBSESSIVE-COMPULSIVE DISORDER, UNSPECIFIED TYPE: ICD-10-CM

## 2024-12-03 DIAGNOSIS — E66.3 OVERWEIGHT (BMI 25.0-29.9): ICD-10-CM

## 2024-12-03 DIAGNOSIS — E11.9 TYPE 2 DIABETES MELLITUS WITHOUT COMPLICATIONS (MULTI): ICD-10-CM

## 2024-12-03 DIAGNOSIS — G90.A POTS (POSTURAL ORTHOSTATIC TACHYCARDIA SYNDROME): Primary | ICD-10-CM

## 2024-12-03 DIAGNOSIS — E11.9 DIABETES MELLITUS TYPE 2 WITHOUT RETINOPATHY (MULTI): ICD-10-CM

## 2024-12-03 DIAGNOSIS — Q79.60 EHLERS-DANLOS SYNDROME (HHS-HCC): ICD-10-CM

## 2024-12-03 PROCEDURE — 99214 OFFICE O/P EST MOD 30 MIN: CPT | Performed by: FAMILY MEDICINE

## 2024-12-03 PROCEDURE — 3078F DIAST BP <80 MM HG: CPT | Performed by: FAMILY MEDICINE

## 2024-12-03 PROCEDURE — 4004F PT TOBACCO SCREEN RCVD TLK: CPT | Performed by: FAMILY MEDICINE

## 2024-12-03 PROCEDURE — 3044F HG A1C LEVEL LT 7.0%: CPT | Performed by: FAMILY MEDICINE

## 2024-12-03 PROCEDURE — 3074F SYST BP LT 130 MM HG: CPT | Performed by: FAMILY MEDICINE

## 2024-12-03 RX ORDER — ATORVASTATIN CALCIUM 40 MG/1
40 TABLET, FILM COATED ORAL NIGHTLY
Qty: 90 TABLET | Refills: 1 | Status: SHIPPED | OUTPATIENT
Start: 2024-12-03

## 2024-12-03 RX ORDER — SEMAGLUTIDE 1.34 MG/ML
1 INJECTION, SOLUTION SUBCUTANEOUS
Qty: 6 ML | Refills: 1 | Status: SHIPPED | OUTPATIENT
Start: 2024-12-03

## 2024-12-03 RX ORDER — FLUVOXAMINE MALEATE 25 MG/1
12.5 TABLET ORAL NIGHTLY
Qty: 45 TABLET | Refills: 1 | Status: SHIPPED | OUTPATIENT
Start: 2024-12-03

## 2024-12-03 NOTE — PATIENT INSTRUCTIONS
Try to get up slowly at night to help prevent falls.  Stay well hydrated.   Continue the same medications.  Return in three months for a recheck.  Follow up with cardiology, Dr Simmons, as directed.  It is ok to not restrict sodium in your diet.

## 2024-12-03 NOTE — PROGRESS NOTES
"Subjective   Patient ID: [id number]     Bhavani Carr is a 49 y.o. female who presents for Follow-up (3 month.  C/O 4 POTS Flare-ups since last vist.), Med Refill, and Fall (11/8/20242024/11/16/2024/NO INJURIES.).  HPI  She is here for a recheck on diabetes and POTS.  She has had more symptosm of POTS lately.  She is on midodrine.  She remains on Ozempic.       BP today is 96/60.      She had two falls, 11/8 and 11/6.  In one, she had a \"fat lip\" in another she had a bruise on her nose.    She has had five days of symptoms of POTS since the last visit.      She remains on Luvox for OCD.  She has tried to wean off of it and \"my brain was racing\".  She does not do well off of it.      She is going for genetic testing on 12/31/24 to see if it is Cierra Danlos.  She has hypermobility of her joints.    She remains on the Ozempic.  No side effects of it now that she is down to the 1 mgm dose.  Had vomiting on the 2 mg dose.    Objective     BP 96/60 (BP Location: Left arm, Patient Position: Sitting)   Temp 36 °C (96.8 °F) (Skin)   Wt 69.9 kg (154 lb)   BMI 28.17 kg/m²      Physical Exam  Constitutional:       Appearance: Normal appearance.   Cardiovascular:      Rate and Rhythm: Normal rate and regular rhythm.      Heart sounds: Normal heart sounds. No murmur heard.  Pulmonary:      Effort: Pulmonary effort is normal. No respiratory distress.      Breath sounds: Normal breath sounds.   Neurological:      General: No focal deficit present.      Mental Status: She is alert and oriented to person, place, and time.   Psychiatric:         Mood and Affect: Mood normal.         Assessment/Plan   Problem List Items Addressed This Visit       Diabetes mellitus type 2 without retinopathy (Multi)    Relevant Orders    Urinalysis with Reflex Microscopic    Thyroid Stimulating Hormone    Hemoglobin A1C    Lipid Panel    Comprehensive Metabolic Panel    CBC and Auto Differential    POTS (postural orthostatic tachycardia syndrome) - " Primary    Relevant Orders    Urinalysis with Reflex Microscopic    Thyroid Stimulating Hormone    Hemoglobin A1C    Lipid Panel    Comprehensive Metabolic Panel    CBC and Auto Differential     Other Visit Diagnoses       Cierra-Danlos syndrome (HHS-HCC)        Obsessive-compulsive disorder, unspecified type            Try to get up slowly at night to help prevent falls.  Stay well hydrated.   Continue the same medications.  Return in three months for a recheck.  Follow up with cardiology, Dr Simmons, as directed.  It is ok to not restrict sodium in your diet.      Hossein Jimenez, DO

## 2024-12-31 ENCOUNTER — APPOINTMENT (OUTPATIENT)
Dept: GENETICS | Facility: CLINIC | Age: 49
End: 2024-12-31
Payer: MEDICARE

## 2024-12-31 VITALS
TEMPERATURE: 97.7 F | HEART RATE: 67 BPM | BODY MASS INDEX: 27.88 KG/M2 | HEIGHT: 62 IN | WEIGHT: 151.5 LBS | SYSTOLIC BLOOD PRESSURE: 105 MMHG | DIASTOLIC BLOOD PRESSURE: 72 MMHG

## 2024-12-31 DIAGNOSIS — M21.42 PES PLANUS OF BOTH FEET: ICD-10-CM

## 2024-12-31 DIAGNOSIS — G90.A POTS (POSTURAL ORTHOSTATIC TACHYCARDIA SYNDROME): ICD-10-CM

## 2024-12-31 DIAGNOSIS — M21.41 PES PLANUS OF BOTH FEET: ICD-10-CM

## 2024-12-31 DIAGNOSIS — Z82.49 FAMILY HX OF AORTIC ANEURYSM: ICD-10-CM

## 2024-12-31 DIAGNOSIS — K08.109 COMPLETE LOSS OF TEETH, UNSPECIFIED CAUSE, UNSPECIFIED CLASS: ICD-10-CM

## 2024-12-31 DIAGNOSIS — M35.7 HYPERMOBILITY SYNDROME: Primary | ICD-10-CM

## 2024-12-31 PROCEDURE — 4004F PT TOBACCO SCREEN RCVD TLK: CPT | Performed by: INTERNAL MEDICINE

## 2024-12-31 PROCEDURE — 99205 OFFICE O/P NEW HI 60 MIN: CPT | Performed by: INTERNAL MEDICINE

## 2024-12-31 PROCEDURE — G2212 PROLONG OUTPT/OFFICE VIS: HCPCS | Performed by: INTERNAL MEDICINE

## 2024-12-31 PROCEDURE — 3074F SYST BP LT 130 MM HG: CPT | Performed by: INTERNAL MEDICINE

## 2024-12-31 PROCEDURE — 3008F BODY MASS INDEX DOCD: CPT | Performed by: INTERNAL MEDICINE

## 2024-12-31 PROCEDURE — 3044F HG A1C LEVEL LT 7.0%: CPT | Performed by: INTERNAL MEDICINE

## 2024-12-31 PROCEDURE — 3078F DIAST BP <80 MM HG: CPT | Performed by: INTERNAL MEDICINE

## 2024-12-31 ASSESSMENT — PATIENT HEALTH QUESTIONNAIRE - PHQ9
2. FEELING DOWN, DEPRESSED OR HOPELESS: MORE THAN HALF THE DAYS
SUM OF ALL RESPONSES TO PHQ9 QUESTIONS 1 AND 2: 2
1. LITTLE INTEREST OR PLEASURE IN DOING THINGS: NOT AT ALL

## 2024-12-31 NOTE — PROGRESS NOTES
MEDICAL GENETICS INITIAL VISIT NOTE     Patient full name: Bhavani Carr  MRN: [id number]  YOB: 1975   Present during this visit: The patient     Primary care and referring provider: Hossein Jimenez DO    Medical history was obtained from the patient. Prior to this appointment, I reviewed medical records from outpatient medical records and scanned documents, if available.     History of present illness:    Dear Dr. Jimenez:    It was a pleasure to see Ms. Carr in clinic today. Ms. Carr is a 49 y.o. female who was referred to Genetics for evaluation of inherited connective tissue disorders. Ms. Carr has chronic longstanding musculoskeletal issues as outlined below. Pertinent negative and positive history related to inherited connective tissue disorders are as followings:     Musculoskeletal  - Hypermobility: Yes  - Joint laxity: Yes  - Joint dislocation: Yes dislocated R thumb from an injury  - Chronic widespread musculoskeletal pain: Yes  alondra at neck and arms. Seeing a pain specialist. She had an injury in 2013 and developed pain at the upper body, s/p spinal cord stimulator placement.   - Tendon/muscle rupture:  No  - Fractures: Yes  two at fingers from an injury. Two at feet (unexplained); one of which was from standing from a squatting position.   - Scoliosis: No  - Pectus differences: No  - Flat feet: Yes  needed orthotics at young age for this.   - She is s/p ankle reconstructive surgeries due to ligament/tendon tears.   - At 15, she had a knee injury and xray showed abnormal findings. Reportedly, her doctor told her that there is no cartilage at the R knee. She has had three surgeries at R knee. First one was in 2008 (at 34), then 2017 and 2022. She has arthritis at young age.   - Reports significant growing pain at young age (elbows, knees, ankles).      Skin  - Spontaneous skin separation: No  - Easy bruising: Yes  - Skin hyperextensibility: ?  - Stretch marks not related to  "weight change: Yes  - Abnormal scar formation, atrophic scar, wound dehiscence: some scars appear wide.   - Early-onset varicose veins: No     Dental  - Reports that teeth are \"soft\". When she was young, teeth could not \"hold fillings\". She started to lose some of her teeth in her 20s.  - Dental crowding: No  - Gingival disease: No     Cardiopulmonary:  - Orthostatic intolerance: Yes   dx with POTS. Seeing Cardiology (Dr. Simmons). She reports ongoing symptoms.   - Pneumothorax: No  - Echocardiogram: Yes, most recent 2024 without aortic root dilatation    - Spontaneous rupture/aneurysm/dissection of blood vessels: No    GI/  - IBS-like symptoms: Reflux, occasional constipation  - Hernia: No  - Rectal prolapse: No  - Pelvic/uterine prolapse: No  - Spontaneous rupture of internal organs: No  - , 3 miscarriages   - Obstetric complications: Yes  has had postpartum hemorrhage from a vaginal tear.     Ocular/ENT  - Astigmatism, strabismus s/p sx. Seen by Ophthalmology this year.   - Lens dislocation:  No  - High myopia:  No  - Hearing loss:  No    Neuropsychiatric:  - Migraines/headaches:  No  - Neuropathic symptoms:  Yes   occasionally    Pediatric history: No congenital hip dislocation, congenital club feet, hypotonia, developmental delay, autism.  Flat feet as a child, s/p orthotics use. Growing pain.     Other medical issues include DM, OCD, HLP, GERD.     PSH - tonsil sx, strabismus sx, 5 surgeries for knees and ankles, hysterectomy in  due to precancerous cervical findings, tubal ligation, carpal tunnel sx, trigger finger sx, ganglion cyst removal.     Social history:  Currently disabled. Was in . Quit smoking 3 years ago. Smoked 1 PPD for 25-28 years. Vaping.     Family history:  Four-generation family tree was obtained and scanned to chart, under \"Genetics\" folder.  Pertinent history   33yo daughter, 4'11\", \"soft teeth\", joint issues, knee sx at 15.   Granddaughter (d) " gastroschisis/omphalocele  Sister, pituitary tumor, two sons with Angie-Wiedemann syndrome  Mom (d) COPD, arthritis, CHF, DM  Dad (d) MI, aortic aneurysm (location?)  Pat aunt (d) breast ca in her 60s     No other family members with ectopia lentis, rupture of internal organs, early deafness/blindness, or spontaneous pneumothorax.     Azerbaijani, Macedonian, Romanian, N. A.   Ashkenazi Anabaptism: Denied  Consanguinity: Denied      Physical examination:  Arm span to height ratio:  164:157.5 = 1.04  Upper segment to lower segment ratio: 77.5:80 ~1  GENERAL: The patient is alert, in no apparent distress. Stocky built.   Head shape is normal. HC 55cm  Face: No malar hypoplasia. Forehead, nose, philthrum, and chin appear normal.  Eyes: Not deep set. Palpebral fissures normally positioned. Sclerae not blue or icteric. PERRLA. No iridodonesis.  Ears: Not dysplastic, posteriorly rotated, or low set.  Oral cavity: No high arched palate. No dental crowding. Normal uvula, normal dentition, no receding gum line or gingivitis.  CHEST/LUNGS: No pectus differences. Lungs are clear bilaterally without rhonchi, rales, or wheezes.  HEART: RRR, no R/M/G.  ABDOMEN: No abdominal wall defect.   EXTREMITIES/Back: Distal joint hypermobility is present. 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 No arachnodactyly. Wrist signs negative. Thumb signs negative. Bilateral piezogenic papules+. Acrogeria-. No joint contractures. No pretibial plaque. Hindfoot deformity-, flat feet+, scoliosis- by forward bending test. ?mild genu varus at L leg. No peripheral edema. One varicose vein at R calf.   SKIN: Stretch marks deferred. No significant skin hyperextensibility, no bruising, soft skin consistency+. Veins are visible at chest wall and arms. Scars are mildly atrophic at both knees.    NEUROLOGIC: EOMI without nystagmus. No ptosis. No facial palsy. Tongue in midline. No dysarthria. Muscle power 5+ throughout. Normal gait without abnormal movement.    Psychiatric: Cooperative. Appropriate mood and affect.     Beighton score for generalized joint hypermobility  1. Passive dorsiflexion and hyperextension of the fifth MCP joint beyond 90°: 2  2. Passive apposition of the thumb to the flexor aspect of the forearm: 0  3. Passive hyperextension of the elbow beyond 10°:  1 (R)  4. Passive hyperextension of the knee beyond 10°:  0  5. Active forward flexion of the trunk with the knees fully extended so that the palms of the hands rest flat on the floor:  1  Total  4/9 (where a score of equal to or more than  4 is considered positive for age [she will be 49 yo in 2 weeks])     Systemic score for Marfan syndrome is  2 (skin striae + pes planus) where a score of equal to or more than 7 is considered positive systemic score.     Results:    ALP - not low    Echo 3/2024  PHYSICIAN INTERPRETATION:  Left Ventricle: Left ventricular systolic function is normal, with an estimated ejection fraction of 55-60%. There are no regional wall motion abnormalities. The left ventricular cavity size is normal. The left ventricular septal wall thickness is normal. There is normal left ventricular posterior wall thickness. Spectral Doppler shows a normal pattern of left ventricular diastolic filling.  Left Atrium: The left atrium is normal in size. Left atrial volume index 22.3 mL/m2.  Right Ventricle: The right ventricle is normal in size. There is normal right ventricular global systolic function. Normal right ventricular chamber size and function.  Right Atrium: The right atrium is normal in size.  Aortic Valve: The aortic valve is trileaflet. There is no evidence of aortic valve regurgitation. The peak instantaneous gradient of the aortic valve is 11.7 mmHg. The mean gradient of the aortic valve is 7.0 mmHg.  Mitral Valve: The mitral valve is normal in structure. There is trace to mild mitral valve regurgitation. Trivial to 1+ mitral regurgitation.  Tricuspid Valve: The tricuspid valve  "is structurally normal. There is trace to mild tricuspid regurgitation. Trivial to 1+ tricuspid regurgitation with estimated RVSP 20 mmHg.  Pulmonic Valve: The pulmonic valve is structurally normal. There is trace to mild pulmonic valve regurgitation.  Pericardium: There is no pericardial effusion noted.  Aorta: The aortic root is normal.     CONCLUSIONS:   1. Left ventricular systolic function is normal with a 55-60% estimated ejection fraction.   2. Normal right ventricular chamber size and function.   3. Trivial to 1+ mitral regurgitation.   4. Trivial to 1+ tricuspid regurgitation with estimated RVSP 20 mmHg.   5. No previous available for comparison.     Assessment:  Ms. Carr is a 49 y.o. female with phenotypes as outlined in the HPI and PE sections. 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 Echocardiogram and eye exam were without stigmata of connective tissue disorders. Family history is notable for father with \"aortic aneurysm\" (thoracic? abdominal?).     I reviewed etiology of joint hypermobility: normal variation, inherited connective tissue disorders, skeletal dysplasias, among other genetic conditions.     She has many clinical features of skeletal dysplasias as summarized above. For example, she may have FGFR3-related hypochondroplasia, characterized by short stature, stocky build, joint laxity, and other joint issues. She has early-onset osteoarthritis which is a feature of several skeletal dysplasias and connective tissue disorders such as collagen type II disorders or SMAD3 mutations. She also has clinical features of connective tissue disorders such as joint hypermobility, teeth " loss, and fractures. Her father reportedly had aortic aneurysm. I recommend genetic testing for these two groups of conditions. If negative, her clinical diagnosis would be hypermobility spectrum disorder.     She asked if there is any test for obesity. I explained that it is a multifactorial trait. She does not have a red flag sign of monogenic obesity syndromes.     Benefits of having genetic test include 1) to establish a molecular diagnosis; 2) to provide further medical recommendations specific to each diagnosis; 3) for familial cascade testing, recurrence risk estimation, and family planning; and 4) to allow for participation in support group organizations and enrolment in clinical trials, if any.  Pretest genetic counseling was provided, including the nature of the test; three types of test result (positive, negative, and uncertain); the fact that a negative result does not exclude a possibility of genetic disorders; and retention of de-identified genetic data at the testing company. The patient provided a verbal informed consent.     Plan:  - [facility] Skeletal Disorders + Connective Tissue panel. TAT 4 weeks. Sample: blood obtained today. Insurance billing. Ms. Carr is aware of a potential self-pay option of $250.  - A referral to an autonomic specialist placed per her request.   - A referral to PM&R placed for management of symptomatic joint hypermobility.   - Return to clinic when results are available.    Thank you for allowing me to participate in the care of this patient. Please do not hesitate to contact me if you have any questions.   Sincerely,     Mary Putnam MD    Medical Geneticist  [facility]  Phone: [phone]  Fax: [phone]   Address: [address] [place] [address]  Time spent (this information is required by insurance): face-to-face 92min (11am-12.32pm); preparation 10min; documentation 30min; placing order(s) for genetic  testing 5min; total time spent 137min

## 2024-12-31 NOTE — LETTER
12/31/24    Hossein Jimenez DO  [address] [address]  [place] [address]      Dear Dr. Hossein Jimenez DO,    Thank you for referring your patient, Bhavani Carr, to receive care in my office. I have enclosed a summary of the care provided to Bhavani on 12/31/24.    Please contact me with any questions you may have regarding the visit.    Sincerely,         Mary Putnam MD  [address]  [facility] [id number]    CC: No Recipients

## 2025-01-29 ENCOUNTER — APPOINTMENT (OUTPATIENT)
Dept: GENETICS | Facility: CLINIC | Age: 50
End: 2025-01-29
Payer: MEDICARE

## 2025-01-31 DIAGNOSIS — E66.3 OVERWEIGHT (BMI 25.0-29.9): ICD-10-CM

## 2025-01-31 DIAGNOSIS — E11.9 DIABETES MELLITUS TYPE 2 WITHOUT RETINOPATHY (MULTI): ICD-10-CM

## 2025-01-31 RX ORDER — SEMAGLUTIDE 1.34 MG/ML
1 INJECTION, SOLUTION SUBCUTANEOUS
Qty: 3 ML | Refills: 1 | Status: SHIPPED | OUTPATIENT
Start: 2025-02-02

## 2025-02-05 ENCOUNTER — LAB (OUTPATIENT)
Dept: LAB | Facility: HOSPITAL | Age: 50
End: 2025-02-05
Payer: MEDICARE

## 2025-02-05 ENCOUNTER — APPOINTMENT (OUTPATIENT)
Dept: CARDIOLOGY | Facility: CLINIC | Age: 50
End: 2025-02-05
Payer: MEDICARE

## 2025-02-05 VITALS
BODY MASS INDEX: 27.6 KG/M2 | WEIGHT: 150 LBS | DIASTOLIC BLOOD PRESSURE: 76 MMHG | HEART RATE: 80 BPM | HEIGHT: 62 IN | SYSTOLIC BLOOD PRESSURE: 120 MMHG

## 2025-02-05 DIAGNOSIS — Z87.891 FORMER SMOKER: ICD-10-CM

## 2025-02-05 DIAGNOSIS — R55 VASODEPRESSOR SYNCOPE: ICD-10-CM

## 2025-02-05 DIAGNOSIS — I95.1 AUTONOMIC ORTHOSTATIC HYPOTENSION: ICD-10-CM

## 2025-02-05 DIAGNOSIS — M35.7 HYPERMOBILITY SYNDROME: ICD-10-CM

## 2025-02-05 DIAGNOSIS — R55 SYNCOPE, UNSPECIFIED SYNCOPE TYPE: ICD-10-CM

## 2025-02-05 DIAGNOSIS — G90.A POTS (POSTURAL ORTHOSTATIC TACHYCARDIA SYNDROME): Primary | ICD-10-CM

## 2025-02-05 PROCEDURE — 3078F DIAST BP <80 MM HG: CPT | Performed by: INTERNAL MEDICINE

## 2025-02-05 PROCEDURE — 3074F SYST BP LT 130 MM HG: CPT | Performed by: INTERNAL MEDICINE

## 2025-02-05 PROCEDURE — 3008F BODY MASS INDEX DOCD: CPT | Performed by: INTERNAL MEDICINE

## 2025-02-05 PROCEDURE — 93000 ELECTROCARDIOGRAM COMPLETE: CPT | Performed by: INTERNAL MEDICINE

## 2025-02-05 PROCEDURE — 99214 OFFICE O/P EST MOD 30 MIN: CPT | Performed by: INTERNAL MEDICINE

## 2025-02-05 PROCEDURE — 9990000009 MISCELLANEOUS GENETICS TEST

## 2025-02-05 RX ORDER — FLUDROCORTISONE ACETATE 0.1 MG/1
0.1 TABLET ORAL DAILY
Qty: 90 TABLET | Refills: 3 | Status: SHIPPED | OUTPATIENT
Start: 2025-02-05 | End: 2026-02-05

## 2025-02-05 ASSESSMENT — ENCOUNTER SYMPTOMS
DIZZINESS: 1
NEAR-SYNCOPE: 1
PALPITATIONS: 0
DYSPNEA ON EXERTION: 0
LIGHT-HEADEDNESS: 1

## 2025-02-05 NOTE — PROGRESS NOTES
CARDIOLOGY OFFICE VISIT      CHIEF COMPLAINT  Chief Complaint   Patient presents with    Follow-up     Pt is here today following up after 6 months        HISTORY OF PRESENT ILLNESS  HPI  50-year-old  female who is followed for postural orthostatic tachycardia syndrome, autonomic dysfunction, and positive head up tilt table test with nitroglycerin provocation consistent with vasopressor syncope. A 29 minutes into the test patient blood pressure dropped to 48/36 with a heart rate of 78. She is maintained on midodrine.    Patient states that since the last office visit she had at least 14 days that she was not able to do anything.  She felt very fatigued dizzy and lightheaded.  Also she had at least 2 episodes of falling related with postural changes.    EKG performed today shows sinus rhythm at a rate of 80 bpm QRS duration 90 ms QT corrected 442 ms.  Rhythm strip shows the same pattern.      Past Medical History  Past Medical History:   Diagnosis Date    Hypermetropia, right eye 03/16/2017    Hyperopia of right eye with astigmatism and presbyopia    Hypermetropia, right eye 03/16/2017    Hyperopia of right eye with astigmatism and presbyopia    Hypermetropia, right eye 03/16/2017    Hyperopia of right eye with astigmatism and presbyopia    Hypermetropia, right eye 03/16/2017    Hyperopia of right eye with astigmatism and presbyopia    Personal history of diseases of the blood and blood-forming organs and certain disorders involving the immune mechanism 06/06/2015    History of leukocytosis    Personal history of diseases of the blood and blood-forming organs and certain disorders involving the immune mechanism 07/16/2020    History of leukocytosis    Personal history of other endocrine, nutritional and metabolic disease 07/18/2019    History of diabetes mellitus    Personal history of urinary (tract) infections 06/30/2015    History of urinary tract infection    S/P insertion of spinal cord stimulator      Unilateral primary osteoarthritis, right knee 12/20/2022    Right knee DJD       Social History  Social History     Tobacco Use    Smoking status: Former     Types: Cigarettes    Smokeless tobacco: Current    Tobacco comments:     Vapes daily   Vaping Use    Vaping status: Every Day    Substances: Nicotine   Substance Use Topics    Alcohol use: Not Currently    Drug use: Not Currently     Types: Marijuana       Family History     Family History   Problem Relation Name Age of Onset    Diabetes Mother      Hypertension Mother      Stroke Mother      Coronary artery disease Father      Other (cardiac disorder) Father      Hypertension Sister      Other (pituitary tumor) Sister      Immunodeficiency Brother      Diabetes Mother's Sister      Breast cancer Father's Sister      Stroke Father's Sister      Diabetes Maternal Grandmother          Allergies:  Allergies   Allergen Reactions    Yellow Jacket Venom Anaphylaxis    Bee Venom Protein (Honey Bee) Unknown    Fluzone Quad 2706-0829 (Pf) [Flu Vacc [id number] 6mos Up(Pf)] Other     Deathly ill    Guaifenesin Unknown    Guaifenex Unknown    Influenza Virus Vac. Tri-Split Other    Influenza Virus Vaccine Whole Unknown    Lidocaine Other     Lidocaine injections - become partially paralyzed    Metoprolol Unknown    Morphine Unknown and Hives    Iodinated Contrast Media Nausea And Vomiting        Outpatient Medications:  Current Outpatient Medications   Medication Instructions    ascorbic acid, vitamin C, 100 mg chewable tablet Chew.    atorvastatin (LIPITOR) 40 mg, oral, Nightly    biotin 10,000 mcg capsule Take by mouth.    cholecalciferol (Vitamin D-3) 50 mcg (2,000 unit) capsule Take by mouth.    cyanocobalamin (Vitamin B-12) 50 mcg tablet Take by mouth.    EPINEPHrine 0.3 mg/0.3 mL injection syringe as directed for acute allergic reaction    fLuvoxaMINE (LUVOX) 12.5 mg, oral, Nightly    medical cannabis 1 each    dybnavs-ENVZ-xex-valer-hops-lm 0.15--200 mg capsule  20 mg    midodrine (PROAMATINE) 5 mg, oral, 3 times daily (morning, midday, late afternoon)    multivitamin with folic acid (Daily-Mala, with folic acid,) 400 mcg tablet 1 tablet, Daily    omeprazole (PRILOSEC) 10 mg, oral, Daily    OneTouch Delica Plus Lancet 1 Units, Not Applicable, 3 times daily, Please fill with lancets compatible with One Touch Verio Glucometer Kit.    OneTouch Verio Reflect Meter misc USE AS DIRECTED TO MONITOR BLOOD GLUCOSE    OneTouch Verio test strips strip USE TO TEST ONCE DAILY    OXcarbazepine (TRILEPTAL) 300 mg, Nightly    Ozempic 1 mg, subcutaneous, Once Weekly    prasterone, dhea, 10 mg tablet Take by mouth.    tiZANidine (ZANAFLEX) 4 mg, 4 times daily PRN          REVIEW OF SYSTEMS  Review of Systems   Cardiovascular:  Positive for near-syncope. Negative for chest pain, dyspnea on exertion and palpitations.   Neurological:  Positive for dizziness and light-headedness.   All other systems reviewed and are negative.        VITALS  Vitals:    02/05/25 1433   BP: 120/76   Pulse: 80       PHYSICAL EXAM  Constitutional:       General: Awake.      Appearance: Normal and healthy appearance. Well-developed and not in distress.   Neck:      Vascular: No JVR. JVD normal.   Pulmonary:      Effort: Pulmonary effort is normal.      Breath sounds: Normal breath sounds. No wheezing. No rhonchi. No rales.   Chest:      Chest wall: Not tender to palpatation.   Cardiovascular:      PMI at left midclavicular line. Normal rate. Regular rhythm. Normal S1. Normal S2.       Murmurs: There is no murmur.      No gallop.  No click. No rub.   Pulses:     Intact distal pulses.   Edema:     Peripheral edema absent.   Abdominal:      Tenderness: There is no abdominal tenderness.   Musculoskeletal: Normal range of motion.         General: No tenderness. Skin:     General: Skin is warm and dry.   Neurological:      General: No focal deficit present.      Mental Status: Alert and oriented to person, place and time.            ASSESSMENT AND PLAN    Clinical impressions:  1.  Autonomic dysfunction with vasodepressor response to head up tilt table test with nitroglycerin provocation on midodrine.  2.  Postural orthostatic tachycardia syndrome.  3.  Dyslipidemia on statin.  4.  Diabetes mellitus type 2.  5.  Hypotension.  6.  Overweight with a BMI of 27.80.  7.  Gastroesophageal reflux disease.    Plan recommendations    Patient had episodes of dizziness and near syncope associated with postural changes.  Not very well-controlled with midodrine.  Will add Florinef 0.1 mg 1 tablet daily.    Follow my office every 6 months or sooner needed.     Risk factor modification and lifestyle modification discussed with patient. Diet , exercise and hydration discussed with patient.    I have personally review with patient during this office visit, laboratory data, echocardiogram results, stress test results, Holter-event monitor results prior and after the last electrophysiology visit. All questions has been answered.    Please excuse any errors in grammar or translation related to this dictation.  Voice recognition software was utilized to prepare this document.

## 2025-02-05 NOTE — PATIENT INSTRUCTIONS
Continue same medications/treatment.  Patient educated on proper medication use.  Patient educated on risk factor modification.  Please bring any lab results from other providers/physicians to your next appointment.    Please bring all medicines, vitamins, and herbal supplements with you when you come to the office.    Prescriptions will not be filled unless you are compliant with your follow up appointments or have a follow up appointment scheduled as per instruction of your physician. Refills should be requested at the time of your visit.    START Florinef 0.1mg once daily  Follow up with our physician assistant, Syl, in 6 months     Radha ECHOLS RN, AM SCRIBING FOR, AND IN THE PRESENCE OF DR. ZEINAB VALLADARES MD

## 2025-02-24 LAB
COMMENTS - MP RESULT TYPE: NORMAL
SCAN RESULT: NORMAL

## 2025-02-27 ENCOUNTER — APPOINTMENT (OUTPATIENT)
Dept: ORTHOPEDIC SURGERY | Facility: CLINIC | Age: 50
End: 2025-02-27
Payer: MEDICARE

## 2025-02-27 DIAGNOSIS — M67.911 TENDINOPATHY OF RIGHT ROTATOR CUFF: Primary | ICD-10-CM

## 2025-02-27 DIAGNOSIS — M76.02 GLUTEAL TENDINITIS, LEFT HIP: ICD-10-CM

## 2025-02-27 DIAGNOSIS — M35.7 HYPERMOBILITY SYNDROME: ICD-10-CM

## 2025-02-27 PROCEDURE — 99204 OFFICE O/P NEW MOD 45 MIN: CPT | Performed by: PHYSICAL MEDICINE & REHABILITATION

## 2025-02-27 SDOH — SOCIAL STABILITY: SOCIAL NETWORK: SOCIAL ACTIVITY:: 9

## 2025-02-27 NOTE — LETTER
February 27, 2025     Hossein Jimenez DO  [id number] [facility] [address]  [place] [place] [address]    Patient: Bhavani Carr   YOB: 1975   Date of Visit: 2/27/2025       Dear Dr. Hossein Jimenez DO:    Thank you for referring Bhavani Carr to me for evaluation. Below are my notes for this consultation.  If you have questions, please do not hesitate to call me. I look forward to following your patient along with you.       Sincerely,     Elkin Mallory MD      CC: Mary Putnam MD  ______________________________________________________________________________________    PM&R  / Ortho clinic eval:    IMPRESSION:    Undifferentiated hypermobility disorder with POTS  Left shoulder pain - chronic after being arrested - probably chronic cuff tendinopathy ?RSD  Left hip pain - probably glut tendinitis with mild weakness  Right patellofemoral knee replacement 2022  H/o Spinal cord stim 2015 C2-6 - still seeing Dr Rodriguez in [place]  H/o Bilateral Carpal Tunnel release    RECOMMENDATIONS:  -personally interpreted xray knees and CT Abd for hip  -Agree with her return to exercise at [facility] - reviewed exercises in detail  - suggested xray and diagnostic US for left shoulder only if this gets worse (possibly cuff needling)  -continue tizanidine and pain mgmt per Dr Rodriguez  - continue voltaren gel and lidocaine patches   f/u prn  - should see Dr Rodriguez again in pain mgmt for spine or nerve pain but I'd certainly see again for joint/soft tissue pains.     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  Is here for  diffuse pains and hypermobility - lifelong issues with pain and joint subluxations, Knees since childhood - used to play lot of sports -  Right knee eventually needed patellar resurfacing.  Then  Pain in right shoulder after getting arrested in 2013 -  ?nerve damage- had workup and lead to disability then  eventually needed spinal cord stim at C2-6 helped greatly with shoulder \"nerve\" pains.  Lost lawsuit against [facility] but still awarded disability eventually.     Lately less active due to POTS x 1 year, just starting back at gym.   Lately  shoulder pain has persisted can't do plank or heavy lifting but has started pool exercise with weights and some machines.    Left hip has been popping out laterally , no full dislocations, more pain laterally.      Right knee still hurts with cold weather 10/10 for winter months, 2/10 otherwise.     Uses Aleve, Tizanidine , Trileptal for tremors, Luvox for OCD    Lost 50lb with Ozempic     Pain Disability Index  Family/Home Responsibilities:: 9  Recreation:: 9  Social Activity:: 9  Occupation:: 0  Sexual Behavior:: 0  Self Care:: 0  Life-Support Activities:: 9  Pain Disability Index Scoring  Pain Disability Index Total Score: 36        Patient reports no fevers, chills, sweats, night pain, weight loss or cancer history no b/bl changes.    Pertinent Physical Exam:  MSK: Cervical Spine: ROM full, Posture non-kyphotic, Non-tender, Spurling negative   R Shoulder Exam: ROM near full but with end range pain and positive impingment. Min reduced int rotation,  Scapular kinetics mildly protractedr > Left.  Tender subacromial, , Pain with supraspinatus activation > rotation .  Empty/full can test pos , Labral tests mildly pos ant but could be cuff.   Left hip ROM full with minimal pain, tender over lateral hip above troch, nopain but " mild weakness with abd  Neuro: Normal Sensation, strength, bulk and tone of upper and  lower limbs bilaterally, reflexes normal/2+ except 1+ right patella.    SUPPORTING DOCUMENTATION (remaining history, exam, other findings):  Work-up reviewed - this has included xray knee, CT abd 2020 -hips OK there    Treatment has included prior PT with knee surgery and prior with shoulder and per above.     See above for Assessment and Plan

## 2025-02-27 NOTE — PROGRESS NOTES
"PM&R  / Ortho clinic eval:    IMPRESSION:    Undifferentiated hypermobility disorder with POTS  Left shoulder pain - chronic after being arrested - probably chronic cuff tendinopathy ?RSD  Left hip pain - probably glut tendinitis with mild weakness  Right patellofemoral knee replacement 2022  H/o Spinal cord stim 2015 C2-6 - still seeing Dr Rodriguez in [place]  H/o Bilateral Carpal Tunnel release    RECOMMENDATIONS:  -personally interpreted xray knees and CT Abd for hip  -Agree with her return to exercise at [facility] - reviewed exercises in detail  - suggested xray and diagnostic US for left shoulder only if this gets worse (possibly cuff needling)  -continue tizanidine and pain mgmt per Dr Rodriguez  - continue voltaren gel and lidocaine patches   f/u prn  - should see Dr Rodriguez again in pain mgmt for spine or nerve pain but I'd certainly see again for joint/soft tissue pains.     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  Is here for  diffuse pains and hypermobility - lifelong issues with pain and joint subluxations, Knees since childhood - used to play lot of sports -  Right knee eventually needed patellar resurfacing.  Then  Pain in right shoulder after getting arrested in 2013 -  ?nerve damage- had workup and lead to disability then  eventually needed spinal cord stim at C2-6 helped greatly with shoulder \"nerve\" pains.  Lost lawsuit against [employer] [facility] [employer] but still awarded " disability eventually.     Lately less active due to POTS x 1 year, just starting back at gym.   Lately  shoulder pain has persisted can't do plank or heavy lifting but has started pool exercise with weights and some machines.    Left hip has been popping out laterally , no full dislocations, more pain laterally.      Right knee still hurts with cold weather 10/10 for winter months, 2/10 otherwise.     Uses Aleve, Tizanidine , Trileptal for tremors, Luvox for OCD    Lost 50lb with Ozempic     Pain Disability Index  Family/Home Responsibilities:: 9  Recreation:: 9  Social Activity:: 9  Occupation:: 0  Sexual Behavior:: 0  Self Care:: 0  Life-Support Activities:: 9  Pain Disability Index Scoring  Pain Disability Index Total Score: 36        Patient reports no fevers, chills, sweats, night pain, weight loss or cancer history no b/bl changes.    Pertinent Physical Exam:  MSK: Cervical Spine: ROM full, Posture non-kyphotic, Non-tender, Spurling negative   R Shoulder Exam: ROM near full but with end range pain and positive impingment. Min reduced int rotation,  Scapular kinetics mildly protractedr > Left.  Tender subacromial, , Pain with supraspinatus activation > rotation .  Empty/full can test pos , Labral tests mildly pos ant but could be cuff.   Left hip ROM full with minimal pain, tender over lateral hip above troch, nopain but mild weakness with abd  Neuro: Normal Sensation, strength, bulk and tone of upper and  lower limbs bilaterally, reflexes normal/2+ except 1+ right patella.    SUPPORTING DOCUMENTATION (remaining history, exam, other findings):  Work-up reviewed - this has included xray knee, CT abd 2020 -hips OK there    Treatment has included prior PT with knee surgery and prior with shoulder and per above.     See above for Assessment and Plan

## 2025-03-03 ENCOUNTER — APPOINTMENT (OUTPATIENT)
Dept: PRIMARY CARE | Facility: CLINIC | Age: 50
End: 2025-03-03
Payer: MEDICARE

## 2025-03-03 VITALS
TEMPERATURE: 97.7 F | BODY MASS INDEX: 27.23 KG/M2 | HEIGHT: 62 IN | WEIGHT: 148 LBS | DIASTOLIC BLOOD PRESSURE: 90 MMHG | SYSTOLIC BLOOD PRESSURE: 142 MMHG

## 2025-03-03 DIAGNOSIS — E11.9 DIABETES MELLITUS TYPE 2 WITHOUT RETINOPATHY (MULTI): ICD-10-CM

## 2025-03-03 DIAGNOSIS — Z00.00 ROUTINE GENERAL MEDICAL EXAMINATION AT HEALTH CARE FACILITY: Primary | ICD-10-CM

## 2025-03-03 DIAGNOSIS — E11.9 TYPE 2 DIABETES MELLITUS WITHOUT COMPLICATIONS (MULTI): ICD-10-CM

## 2025-03-03 DIAGNOSIS — M54.12 RADICULOPATHY, CERVICAL REGION: ICD-10-CM

## 2025-03-03 DIAGNOSIS — D22.72 NEVUS OF LEFT THIGH: ICD-10-CM

## 2025-03-03 DIAGNOSIS — E66.3 OVERWEIGHT (BMI 25.0-29.9): ICD-10-CM

## 2025-03-03 DIAGNOSIS — E11.40 TYPE 2 DIABETES MELLITUS WITH DIABETIC NEUROPATHY, WITHOUT LONG-TERM CURRENT USE OF INSULIN: ICD-10-CM

## 2025-03-03 DIAGNOSIS — L98.9 BENIGN SKIN LESION OF THIGH: ICD-10-CM

## 2025-03-03 DIAGNOSIS — F42.9 OBSESSIVE-COMPULSIVE DISORDER, UNSPECIFIED TYPE: ICD-10-CM

## 2025-03-03 DIAGNOSIS — Z87.891 PERSONAL HISTORY OF TOBACCO USE, PRESENTING HAZARDS TO HEALTH: ICD-10-CM

## 2025-03-03 PROCEDURE — G0439 PPPS, SUBSEQ VISIT: HCPCS | Performed by: FAMILY MEDICINE

## 2025-03-03 PROCEDURE — 3077F SYST BP >= 140 MM HG: CPT | Performed by: FAMILY MEDICINE

## 2025-03-03 PROCEDURE — 3080F DIAST BP >= 90 MM HG: CPT | Performed by: FAMILY MEDICINE

## 2025-03-03 PROCEDURE — 3008F BODY MASS INDEX DOCD: CPT | Performed by: FAMILY MEDICINE

## 2025-03-03 RX ORDER — OMEPRAZOLE 10 MG/1
10 CAPSULE, DELAYED RELEASE ORAL DAILY
Qty: 90 CAPSULE | Refills: 0 | Status: SHIPPED | OUTPATIENT
Start: 2025-03-03

## 2025-03-03 RX ORDER — FLUVOXAMINE MALEATE 25 MG/1
12.5 TABLET ORAL NIGHTLY
Qty: 45 TABLET | Refills: 1 | Status: SHIPPED | OUTPATIENT
Start: 2025-03-03

## 2025-03-03 RX ORDER — ATORVASTATIN CALCIUM 40 MG/1
40 TABLET, FILM COATED ORAL NIGHTLY
Qty: 90 TABLET | Refills: 1 | Status: SHIPPED | OUTPATIENT
Start: 2025-03-03

## 2025-03-03 RX ORDER — SEMAGLUTIDE 1.34 MG/ML
1 INJECTION, SOLUTION SUBCUTANEOUS
Qty: 8 ML | Refills: 1 | Status: SHIPPED | OUTPATIENT
Start: 2025-03-03

## 2025-03-03 ASSESSMENT — ENCOUNTER SYMPTOMS
SORE THROAT: 0
DYSPHORIC MOOD: 0
HEADACHES: 0
LOSS OF SENSATION IN FEET: 0
ARTHRALGIAS: 1
BLOOD IN STOOL: 0
OCCASIONAL FEELINGS OF UNSTEADINESS: 0
COUGH: 0
SINUS PRESSURE: 0
DIZZINESS: 0
PALPITATIONS: 0
HEMATURIA: 0
FATIGUE: 0
WEAKNESS: 0
ABDOMINAL PAIN: 0
NERVOUS/ANXIOUS: 0
CONSTIPATION: 0
SHORTNESS OF BREATH: 0
VOMITING: 0
TROUBLE SWALLOWING: 0
RHINORRHEA: 1
ADENOPATHY: 0
FEVER: 0
DYSURIA: 0
SLEEP DISTURBANCE: 0
DIARRHEA: 0
WHEEZING: 0
WOUND: 0
VOICE CHANGE: 0
MYALGIAS: 0
DEPRESSION: 0
NUMBNESS: 0
FREQUENCY: 0
BACK PAIN: 0
NAUSEA: 0

## 2025-03-03 ASSESSMENT — ACTIVITIES OF DAILY LIVING (ADL)
MANAGING_FINANCES: INDEPENDENT
DOING_HOUSEWORK: INDEPENDENT
BATHING: INDEPENDENT
TAKING_MEDICATION: INDEPENDENT
DRESSING: INDEPENDENT
GROCERY_SHOPPING: INDEPENDENT

## 2025-03-03 NOTE — ASSESSMENT & PLAN NOTE
Orders:    Urinalysis with Reflex Microscopic; Future    Hemoglobin A1C; Future    Lipid Panel; Future    Comprehensive Metabolic Panel; Future    CBC and Auto Differential; Future

## 2025-03-03 NOTE — ASSESSMENT & PLAN NOTE
Orders:    semaglutide (Ozempic) 1 mg/dose (4 mg/3 mL) pen injector; Inject 1 mg under the skin 1 (one) time per week.

## 2025-03-03 NOTE — PATIENT INSTRUCTIONS
I refilled your medication.  Your blood pressure is high today.  You state it is normally better than this level and want to remain on fludrocortisone and Midorin because this help with your POTS.  Return in three months for a recheck. Labs were ordered to be done fasting.  You decline a mammogram, colonoscopy, Shingrix and Prevnar 20 today.  Let me know if you change your mind.

## 2025-03-03 NOTE — PROGRESS NOTES
Subjective   Reason for Visit: Bhavani Carr is an 50 y.o. female here for a Medicare Wellness visit.          Reviewed all medications by prescribing practitioner or clinical pharmacist (such as prescriptions, OTCs, herbal therapies and supplements) and documented in the medical record.    HPI  Tobacco Use: High Risk (3/3/2025)    Patient History     Smoking Tobacco Use: Former     Smokeless Tobacco Use: Current     Passive Exposure: Not on file   Quit normal cigarettes and started vaping three years ago.  She had smoked 1 PPD for 28 years.  Rare alcohol.  Uses marijuana for pain.  No drugs other than that.     She is exercising.    She maintains a healthy diet.     Has advanced directives.   Full code.  Unless there is no hope of a meaningful recovery.        Patient Care Team:  Hossein Jimenez DO as PCP - General  Hossein Jimenez DO as PCP - [employer]age PCP  Micheal Simmons MD as Cardiologist (Electrophysiology)     Review of Systems   Constitutional:  Negative for fatigue and fever.   HENT:  Positive for rhinorrhea. Negative for sinus pressure, sore throat, trouble swallowing and voice change.    Respiratory:  Negative for cough, shortness of breath and wheezing.    Cardiovascular:  Negative for chest pain, palpitations and leg swelling.   Gastrointestinal:  Negative for abdominal pain, blood in stool, constipation, diarrhea, nausea and vomiting.   Genitourinary:  Negative for dysuria, frequency, hematuria and vaginal bleeding.   Musculoskeletal:  Positive for arthralgias. Negative for back pain and myalgias.   Skin:  Negative for rash and wound.        Has a mole on her left thigh.   Neurological:  Negative for dizziness, syncope, weakness, numbness and headaches.   Hematological:  Negative for adenopathy.   Psychiatric/Behavioral:  Negative for dysphoric mood, self-injury, sleep disturbance and suicidal ideas. The patient is not nervous/anxious.        Objective   Vitals:  /90 (BP  "Location: Left arm, Patient Position: Sitting)   Temp 36.5 °C (97.7 °F) (Skin)   Ht 1.562 m (5' 1.5\")   Wt 67.1 kg (148 lb)   BMI 27.51 kg/m²       Physical Exam  Vitals reviewed.   Constitutional:       General: She is not in acute distress.     Appearance: Normal appearance. She is not ill-appearing or toxic-appearing.   HENT:      Head: Normocephalic and atraumatic.      Right Ear: Tympanic membrane, ear canal and external ear normal.      Left Ear: Tympanic membrane, ear canal and external ear normal.      Nose: Nose normal.      Mouth/Throat:      Mouth: Mucous membranes are moist.   Eyes:      Extraocular Movements: Extraocular movements intact.      Conjunctiva/sclera: Conjunctivae normal.      Pupils: Pupils are equal, round, and reactive to light.   Cardiovascular:      Rate and Rhythm: Normal rate and regular rhythm.      Heart sounds: No murmur heard.  Pulmonary:      Effort: Pulmonary effort is normal.      Breath sounds: Normal breath sounds.   Abdominal:      General: Bowel sounds are normal. There is no distension.      Palpations: Abdomen is soft. There is no mass.      Tenderness: There is no abdominal tenderness. There is no guarding or rebound.   Musculoskeletal:         General: No tenderness.      Cervical back: Neck supple.      Right lower leg: No edema.      Left lower leg: No edema.   Skin:     Coloration: Skin is not jaundiced or pale.      Findings: No rash.      Comments: Left upper thigh anteriorly (Danya chaperoned) dark brown raised round 6 mm diameter mole.     Neurological:      General: No focal deficit present.      Mental Status: She is alert and oriented to person, place, and time. Mental status is at baseline.   Psychiatric:         Mood and Affect: Mood normal.         Behavior: Behavior normal.         Thought Content: Thought content normal.         Judgment: Judgment normal.         Assessment & Plan  Type 2 diabetes mellitus with diabetic neuropathy, without long-term " current use of insulin    Orders:    Urinalysis with Reflex Microscopic; Future    Hemoglobin A1C; Future    Lipid Panel; Future    Comprehensive Metabolic Panel; Future    CBC and Auto Differential; Future    Nevus of left thigh         Diabetes mellitus type 2 without retinopathy (Multi)    Orders:    semaglutide (Ozempic) 1 mg/dose (4 mg/3 mL) pen injector; Inject 1 mg under the skin 1 (one) time per week.    Overweight (BMI 25.0-29.9)    Orders:    semaglutide (Ozempic) 1 mg/dose (4 mg/3 mL) pen injector; Inject 1 mg under the skin 1 (one) time per week.    Type 2 diabetes mellitus without complications (Multi)    Orders:    atorvastatin (Lipitor) 40 mg tablet; Take 1 tablet (40 mg) by mouth once daily at bedtime.    Radiculopathy, cervical region    Orders:    omeprazole (PriLOSEC) 10 mg DR capsule; Take 1 capsule (10 mg) by mouth once daily.    Obsessive-compulsive disorder, unspecified type    Orders:    fLuvoxaMINE (Luvox) 25 mg tablet; Take 0.5 tablets (12.5 mg) by mouth once daily at bedtime.    Routine general medical examination at health care facility    Orders:    1 Year Follow Up In Primary Care - Wellness Exam; Future    Personal history of tobacco use, presenting hazards to health    Orders:    CT lung screening low dose; Future       Annual Wellness exam completed   Preventive Health history reviewed:  Labs ordered    Mammogram recommended yearly.  She declines.  BMD not indicated d/t age.  Cscope or Cologuard--declines colonoscopy.  Cologuard was negative in March 2024.  Low dose CT chest for lung cancer screening  Depression Screening done  Advanced Directives Discussion Completed  Cardiovascular risk discussed and if needed, lifestyle modifications recommended, including nutritional choices, exercise, and elimination of habits contributing to risk.  We agreed on a plan to reduce the current cardiovascular risk.  See ecalc ASCVD Risk  Plus for data discussed regarding risk and risk  reduction opportunities.  Aspirin use is not recommended after reviewing the updated guidelines.   Vaccines:  Influenza--pt is allergic to it.  Prevnar 20 pt declines  Shingrix pt declines.        The ASCVD Risk score (Feliz FRAUSTO, et al., 2019) failed to calculate for the following reasons:    The valid total cholesterol range is 130 to 320 mg/dL      I refilled your medication.  Your blood pressure is high today.  You state it is normally better than this level and want to remain on fludrocortisone and Midorin because this help with your POTS.  Return in three months for a recheck. Labs were ordered to be done fasting.  You decline a mammogram, colonoscopy, Shingrix and Prevnar 20 today.  Let me know if you change your mind.

## 2025-03-20 ENCOUNTER — HOSPITAL ENCOUNTER (OUTPATIENT)
Dept: RADIOLOGY | Facility: HOSPITAL | Age: 50
Discharge: HOME | End: 2025-03-20
Payer: MEDICARE

## 2025-03-20 DIAGNOSIS — Z87.891 PERSONAL HISTORY OF TOBACCO USE, PRESENTING HAZARDS TO HEALTH: ICD-10-CM

## 2025-03-20 PROCEDURE — 71271 CT THORAX LUNG CANCER SCR C-: CPT

## 2025-03-21 LAB
ALBUMIN SERPL-MCNC: 4.7 G/DL (ref 3.6–5.1)
ALP SERPL-CCNC: 68 U/L (ref 37–153)
ALT SERPL-CCNC: 16 U/L (ref 6–29)
ANION GAP SERPL CALCULATED.4IONS-SCNC: 8 MMOL/L (CALC) (ref 7–17)
APPEARANCE UR: ABNORMAL
AST SERPL-CCNC: 16 U/L (ref 10–35)
BACTERIA #/AREA URNS HPF: ABNORMAL /HPF
BASOPHILS # BLD AUTO: 73 CELLS/UL (ref 0–200)
BASOPHILS NFR BLD AUTO: 1 %
BILIRUB SERPL-MCNC: 0.5 MG/DL (ref 0.2–1.2)
BILIRUB UR QL STRIP: NEGATIVE
BUN SERPL-MCNC: 15 MG/DL (ref 7–25)
CALCIUM SERPL-MCNC: 9.8 MG/DL (ref 8.6–10.4)
CAOX CRY #/AREA URNS HPF: ABNORMAL /HPF
CHLORIDE SERPL-SCNC: 103 MMOL/L (ref 98–110)
CHOLEST SERPL-MCNC: 148 MG/DL
CHOLEST/HDLC SERPL: 2.5 (CALC)
CO2 SERPL-SCNC: 28 MMOL/L (ref 20–32)
COLOR UR: ABNORMAL
CREAT SERPL-MCNC: 0.85 MG/DL (ref 0.5–1.03)
EGFRCR SERPLBLD CKD-EPI 2021: 83 ML/MIN/1.73M2
EOSINOPHIL # BLD AUTO: 372 CELLS/UL (ref 15–500)
EOSINOPHIL NFR BLD AUTO: 5.1 %
ERYTHROCYTE [DISTWIDTH] IN BLOOD BY AUTOMATED COUNT: 12 % (ref 11–15)
EST. AVERAGE GLUCOSE BLD GHB EST-MCNC: 123 MG/DL
EST. AVERAGE GLUCOSE BLD GHB EST-SCNC: 6.8 MMOL/L
GLUCOSE SERPL-MCNC: 105 MG/DL (ref 65–99)
GLUCOSE UR QL STRIP: NEGATIVE
HBA1C MFR BLD: 5.9 % OF TOTAL HGB
HCT VFR BLD AUTO: 37.4 % (ref 35–45)
HDLC SERPL-MCNC: 60 MG/DL
HGB BLD-MCNC: 12.1 G/DL (ref 11.7–15.5)
HGB UR QL STRIP: NEGATIVE
HYALINE CASTS #/AREA URNS LPF: ABNORMAL /LPF
KETONES UR QL STRIP: ABNORMAL
LDLC SERPL CALC-MCNC: 72 MG/DL (CALC)
LEUKOCYTE ESTERASE UR QL STRIP: NEGATIVE
LYMPHOCYTES # BLD AUTO: 2497 CELLS/UL (ref 850–3900)
LYMPHOCYTES NFR BLD AUTO: 34.2 %
MCH RBC QN AUTO: 31.5 PG (ref 27–33)
MCHC RBC AUTO-ENTMCNC: 32.4 G/DL (ref 32–36)
MCV RBC AUTO: 97.4 FL (ref 80–100)
MONOCYTES # BLD AUTO: 723 CELLS/UL (ref 200–950)
MONOCYTES NFR BLD AUTO: 9.9 %
NEUTROPHILS # BLD AUTO: 3635 CELLS/UL (ref 1500–7800)
NEUTROPHILS NFR BLD AUTO: 49.8 %
NITRITE UR QL STRIP: NEGATIVE
NONHDLC SERPL-MCNC: 88 MG/DL (CALC)
PH UR STRIP: 5.5 [PH] (ref 5–8)
PLATELET # BLD AUTO: 341 THOUSAND/UL (ref 140–400)
PMV BLD REES-ECKER: 9.3 FL (ref 7.5–12.5)
POTASSIUM SERPL-SCNC: 4.8 MMOL/L (ref 3.5–5.3)
PROT SERPL-MCNC: 7.2 G/DL (ref 6.1–8.1)
PROT UR QL STRIP: ABNORMAL
RBC # BLD AUTO: 3.84 MILLION/UL (ref 3.8–5.1)
RBC #/AREA URNS HPF: ABNORMAL /HPF
SERVICE CMNT-IMP: ABNORMAL
SODIUM SERPL-SCNC: 139 MMOL/L (ref 135–146)
SP GR UR STRIP: 1.03 (ref 1–1.03)
SQUAMOUS #/AREA URNS HPF: ABNORMAL /HPF
TRIGL SERPL-MCNC: 75 MG/DL
WBC # BLD AUTO: 7.3 THOUSAND/UL (ref 3.8–10.8)
WBC #/AREA URNS HPF: ABNORMAL /HPF

## 2025-03-24 DIAGNOSIS — G90.A POTS (POSTURAL ORTHOSTATIC TACHYCARDIA SYNDROME): ICD-10-CM

## 2025-03-24 RX ORDER — MIDODRINE HYDROCHLORIDE 5 MG/1
TABLET ORAL
Qty: 270 TABLET | Refills: 1 | Status: SHIPPED | OUTPATIENT
Start: 2025-03-24

## 2025-03-24 NOTE — TELEPHONE ENCOUNTER
Received request for prescription refills for patient.   Patient follows with Dr. Simmons    Request is for Midodrine 5 mg  Is patient currently on medication yes    Last OV 02/05/2025  Next OV 08/06/2025 Syl CHANG    Pended for signing and sent to provider

## 2025-03-28 ENCOUNTER — APPOINTMENT (OUTPATIENT)
Dept: GENETICS | Facility: CLINIC | Age: 50
End: 2025-03-28
Payer: MEDICARE

## 2025-03-28 DIAGNOSIS — M35.7 HYPERMOBILITY SYNDROME: Primary | ICD-10-CM

## 2025-03-28 DIAGNOSIS — G90.A POTS (POSTURAL ORTHOSTATIC TACHYCARDIA SYNDROME): ICD-10-CM

## 2025-03-28 PROCEDURE — 99215 OFFICE O/P EST HI 40 MIN: CPT | Performed by: INTERNAL MEDICINE

## 2025-03-28 NOTE — PROGRESS NOTES
MEDICAL GENETICS FOLLOW-UP VISIT NOTE    Patient full name: Bhavani Carr  MRN: [id number]  YOB: 1975  Present during this visit: The patient     Primary care provider: Hossein Jimenez DO  Primary care and referring provider: Hossein Jimenez DO    Medical history was obtained from the patient. Prior to this appointment, I reviewed medical records from outpatient medical records and scanned documents, if available. This visit was completed via televideo. All issues as below were discussed and addressed but no physical exam was performed. If it was felt that the patient should be evaluated in clinic then they were directed there. The patient consented to visit.    Interval history:  Ms. Carr is a 50 y.o. female who returned to Genetics clinic for connective tissue disorders. We obtained a next-generation sequencing panel of 416 genes for skeletal dysplasias and inherited connective tissue disordres, which came back non-diagnostic, with variant(s) of uncertain significance (VUS) in AFF4 and TGFB1.     From my first note 12/31/2024:  Ms. Carr is a 49 y.o. female who was referred to Genetics for evaluation of inherited connective tissue disorders. Ms. Carr has chronic longstanding musculoskeletal issues as outlined below. Pertinent negative and positive history related to inherited connective tissue disorders are as followings:     Musculoskeletal  - Hypermobility: Yes  - Joint laxity: Yes  - Joint dislocation: Yes dislocated R thumb from an injury  - Chronic widespread musculoskeletal pain: Yes  alondra at neck and arms. Seeing a pain specialist. She had an injury in 2013 and developed pain at the upper body, s/p spinal cord stimulator placement.   - Tendon/muscle rupture:  No  - Fractures: Yes  two at fingers from an injury. Two at feet (unexplained); one of which was from standing from a squatting position.   - Scoliosis: No  - Pectus differences: No  - Flat feet: Yes  needed orthotics at  "young age for this.   - She is s/p ankle reconstructive surgeries due to ligament/tendon tears.   - At 15, she had a knee injury and xray showed abnormal findings. Reportedly, her doctor told her that there is no cartilage at the R knee. She has had three surgeries at R knee. First one was in 2008 (at 34), then  and . She has arthritis at young age.   - Reports significant growing pain at young age (elbows, knees, ankles).       Skin  - Spontaneous skin separation: No  - Easy bruising: Yes  - Skin hyperextensibility: ?  - Stretch marks not related to weight change: Yes  - Abnormal scar formation, atrophic scar, wound dehiscence: some scars appear wide.   - Early-onset varicose veins: No     Dental  - Reports that teeth are \"soft\". When she was young, teeth could not \"hold fillings\". She started to lose some of her teeth in her 20s.  - Dental crowding: No  - Gingival disease: No     Cardiopulmonary:  - Orthostatic intolerance: Yes   dx with POTS. Seeing Cardiology (Dr. Simmons). She reports ongoing symptoms.   - Pneumothorax: No  - Echocardiogram: Yes, most recent 2024 without aortic root dilatation    - Spontaneous rupture/aneurysm/dissection of blood vessels: No     GI/  - IBS-like symptoms: Reflux, occasional constipation  - Hernia: No  - Rectal prolapse: No  - Pelvic/uterine prolapse: No  - Spontaneous rupture of internal organs: No  - , 3 miscarriages   - Obstetric complications: Yes  has had postpartum hemorrhage from a vaginal tear.      Ocular/ENT  - Astigmatism, strabismus s/p sx. Seen by Ophthalmology this year.   - Lens dislocation:  No  - High myopia:  No  - Hearing loss:  No     Neuropsychiatric:  - Migraines/headaches:  No  - Neuropathic symptoms:  Yes   occasionally     Pediatric history: No congenital hip dislocation, congenital club feet, hypotonia, developmental delay, autism.  Flat feet as a child, s/p orthotics use. Growing pain.      Other medical issues include DM, OCD, HLP, GERD. " "     PSH - tonsil sx, strabismus sx, 5 surgeries for knees and ankles, hysterectomy in 2020 due to precancerous cervical findings, tubal ligation, carpal tunnel sx, trigger finger sx, ganglion cyst removal.      Social history:  Currently disabled. Was in . Quit smoking 3 years ago. Smoked 1 PPD for 25-28 years. Vaping.      Family history:  Four-generation family tree was obtained and scanned to chart, under \"Genetics\" folder.  Pertinent history   33yo daughter, 4'11\", \"soft teeth\", joint issues, knee sx at 15.   Granddaughter (d) gastroschisis/omphalocele  Sister, pituitary tumor, two sons with Angie-Wiedemann syndrome  Mom (d) COPD, arthritis, CHF, DM  Dad (d) MI, aortic aneurysm (location?)  Pat aunt (d) breast ca in her 60s      No other family members with ectopia lentis, rupture of internal organs, early deafness/blindness, or spontaneous pneumothorax.     [place], [place], [place], [place]   Ashkenazi Nondenominational: Denied  Consanguinity: Denied      Physical examination:  Arm span to height ratio:  164:157.5 = 1.04  Upper segment to lower segment ratio: 77.5:80 ~1  GENERAL: The patient is alert, in no apparent distress. Stocky built.   Head shape is normal. HC 55cm  Face: No malar hypoplasia. Forehead, nose, philthrum, and chin appear normal.  Eyes: Not deep set. Palpebral fissures normally positioned. Sclerae not blue or icteric. PERRLA. No iridodonesis.  Ears: Not dysplastic, posteriorly rotated, or low set.  Oral cavity: No high arched palate. No dental crowding. Normal uvula, normal dentition, no receding gum line or gingivitis.  CHEST/LUNGS: No pectus differences. Lungs are clear bilaterally without rhonchi, rales, or wheezes.  HEART: RRR, no R/M/G.  ABDOMEN: No abdominal wall defect.   EXTREMITIES/Back: Distal joint hypermobility is present. 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 No arachnodactyly. Wrist signs negative. Thumb signs negative. Bilateral piezogenic papules+. Acrogeria-. No " joint contractures. No pretibial plaque. Hindfoot deformity-, flat feet+, scoliosis- by forward bending test. ?mild genu varus at L leg. No peripheral edema. One varicose vein at R calf.   SKIN: Stretch marks deferred. No significant skin hyperextensibility, no bruising, soft skin consistency+. Veins are visible at chest wall and arms. Scars are mildly atrophic at both knees.    NEUROLOGIC: EOMI without nystagmus. No ptosis. No facial palsy. Tongue in midline. No dysarthria. Muscle power 5+ throughout. Normal gait without abnormal movement.   Psychiatric: Cooperative. Appropriate mood and affect.     Beighton score for generalized joint hypermobility  1. Passive dorsiflexion and hyperextension of the fifth MCP joint beyond 90°: 2  2. Passive apposition of the thumb to the flexor aspect of the forearm: 0  3. Passive hyperextension of the elbow beyond 10°:  1 (R)  4. Passive hyperextension of the knee beyond 10°:  0  5. Active forward flexion of the trunk with the knees fully extended so that the palms of the hands rest flat on the floor:  1  Total  4/9 (where a score of equal to or more than  4 is considered positive for age [she will be 49 yo in 2 weeks])     Systemic score for Marfan syndrome is  2 (skin striae + pes planus) where a score of equal to or more than 7 is considered positive systemic score.      Results:  Genetic testing  [facility] Connective Tissue Disorders Panel + [employer] Skeletal Disorders Pane;  AFF4, c.673C>T (p.Cxl204Hth), heterozygous, Uncertain Significance  TGFB1, c.848A>G (p.Ndr509Vni), heterozygous, Uncertain Significance     ALP - not low     Echo 3/2024  PHYSICIAN INTERPRETATION:  Left Ventricle: Left ventricular systolic function is normal, with an estimated ejection fraction of 55-60%. There are no regional wall motion abnormalities. The left ventricular cavity size is normal. The left ventricular septal wall thickness is normal. There is normal left ventricular posterior wall thickness.  "Spectral Doppler shows a normal pattern of left ventricular diastolic filling.  Left Atrium: The left atrium is normal in size. Left atrial volume index 22.3 mL/m2.  Right Ventricle: The right ventricle is normal in size. There is normal right ventricular global systolic function. Normal right ventricular chamber size and function.  Right Atrium: The right atrium is normal in size.  Aortic Valve: The aortic valve is trileaflet. There is no evidence of aortic valve regurgitation. The peak instantaneous gradient of the aortic valve is 11.7 mmHg. The mean gradient of the aortic valve is 7.0 mmHg.  Mitral Valve: The mitral valve is normal in structure. There is trace to mild mitral valve regurgitation. Trivial to 1+ mitral regurgitation.  Tricuspid Valve: The tricuspid valve is structurally normal. There is trace to mild tricuspid regurgitation. Trivial to 1+ tricuspid regurgitation with estimated RVSP 20 mmHg.  Pulmonic Valve: The pulmonic valve is structurally normal. There is trace to mild pulmonic valve regurgitation.  Pericardium: There is no pericardial effusion noted.  Aorta: The aortic root is normal.     CONCLUSIONS:   1. Left ventricular systolic function is normal with a 55-60% estimated ejection fraction.   2. Normal right ventricular chamber size and function.   3. Trivial to 1+ mitral regurgitation.   4. Trivial to 1+ tricuspid regurgitation with estimated RVSP 20 mmHg.   5. No previous available for comparison.    Assessment:  Ms. Carr is a 49 y.o. female with phenotypes as outlined in the HPI and PE sections. 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 Echocardiogram and eye exam were " "without stigmata of connective tissue disorders. Family history is notable for father with \"aortic aneurysm\" (thoracic? abdominal?).     ###Non-diagnostic genetic test results  Genetic testing of 416 connective tissue disorder genes and skeletal dysplasia genes came back non-diagnostic with variant(s) of uncertain significance (VUS) in AFF4 and TGFB1. The concept of VUS was reviewed. When this variant is reclassified in the future, I will notify the patient. I emphasized that 1) VUSes are very common and its true nature (i.e., pathogenic vs. non-pathogenic) is unknown; and 2) most (>90%) of VUSes are downgraded to benign in the future.    Monoallelic (one change) pathogenic (disease-causing) variants in AFF4 cause a condition characterized by intellectual disability, short stature, skeletal differences, heart defects, among other symptoms. It is unlikely that she has this diagnosis based on her symptoms. Monoallelic pathogenic variants in TGFB1 cause a bone disorder characterized by hyperostosis and cortical thickening of the diaphyses of the long bones. It is less likely that she has this diagnosis based on her symptoms and radiographic features.     In summary, genetic testing came back essentially negative.     Plan:  -I offered additional genetic testing with exome sequencing. Ms. Carr would like to defer additional testing at present and wait for genetics knowledge to evolve prior to consider more testing, which is a reasonable approach. She can see me in 3-5 years unless new phenotypes emerge.     ###A clinical diagnosis of hypermobility spectrum disorder   We discussed that based on her personal and family history, echocardiogram without aortic root dilatation, and non-diagnostic (essentially negative) genetic testing, the most possible diagnosis is hypermobility spectrum disorder (HSD). HSD is a diagnosis when an individual does not fulfill the strict 2017 diagnostic criteria for hypermobile Cierra-Danlos " syndrome (hEDS) and does not have clinical (or molecular) features suggestive of other hereditary connective tissue disorders. Ms. Carr does not fulfill the strict diagnostic criteria for hEDS (scanned in chart). The management of HSD and hEDS is similar and individuals with HSD may later fulfill the hEDS criteria. HSD/hEDS is the most common inherited connective tissue disorder and is the only subtype among 13 EDS subtypes that does not have a testable gene.     Ms. Carr does not have clinical features strongly suggestive of other subtypes of EDS, such as significant skin hyperextensibility, significant atrophic scars (usually with papyraceous and/or hemosideric appearance), premature loss of teeth, infantile hypotonia, bilateral congenital hip dislocation, vascular aneurysm/dissection, or spontaneous organ rupture.     Management of individuals with HSD is symptom-based to address each manifestation that may occur. Pain related to hypermobility may be due to tendinopathy, accelerated joint degeneration, myofascial restrictions, nerve entrapment and neuropathic/sensitized nerves among other causes. Patients with hypermobility often present with other comorbidities such as functional GI issues, chronic headaches from various causes such as migraines, urinary incontinence, pelvic floor dysfunction, mental health issues, mast cell activation syndrome, autonomic nervous system dysfunction (such as POTS), TMJ issues, resistance to local anesthesia, and small fiber neuropathy.     Individuals with joint hypermobility are encouraged to remain active to maintain good muscle tone. Muscle tone can help compensate for the lax tendons and ligaments that do not adequately support the joints. I encourage individuals with joint hypermobility to work with the physical therapist and/or PM&R on exercises designed for toning and strengthening. Aqua therapy also can be very helpful.    Individuals with joint hypermobility may  find relief from supplementing with vitamin D and vitamin C. A deficiency in vitamin D can result in bone pain. Many individuals in [facility] are deficient in vitamin D due to the high number of cloudy days that we have. Vitamin C is a cofactor for collagen production. Many connective tissue problems result from abnormalities of collagen. There is some thought that by maximizing vitamin C intake, collagen production can be optimized to help connective tissue function. If there is no clinical improvement in 2 months, discontinuation of vitamin C is reasonable. Soaking in Epsom salt, which contain magnesium, also can be helpful for management of joint pain.    Lastly, we discussed inheritance of HSD. To date, it is believed to be a multifactorial disorder. Recurrence risk in offspring is challenging to determine but the chance can be up to 50% since autosomal dominant inheritance has been described. There is also intrafamilial phenotypic variability ranging from asymptomatic joint hypermobility, hypermobility spectrum disorder, to hEDS. Other family members should seek medical attention when joint hypermobility becomes symptomatic (e.g., dislocation).     Plan:  o Resource: The Cierra-Danlos Society https://www.cierra-danlos.com/  o Information for primary care provider: [email or web address].cierra-[email or web address]  o Genetic test report provided.   o Musculoskeletal issues:  - Management is symptomatic by PT, PM&R, pain medicine, and/or Orthopedics.   - Avoid contact sports and intense resistance exercise (such as weight lifting)  - Low-resistance exercise to improve muscle tone such as walking, bicycling, low-impact aerobics, water exercise, and simple range-of-motion exercise without added resistance.  - Swimming (or aquatic therapy) and pilates  o Vitamin C up to 2 grams per day (1 gram twice a day, not with empty stomach) may help with bruising. If there is no observable improvement in 2 months, patient may stop  taking it.   o Bone health:  - Consider checking vitamin D level with PCP  - Consider vitamin D supplement (at least 600-800 IU per day) and calcium supplement (500mg twice a day)   - Vitamin D-rich diet: oily fish such as salmon, sardines, herring and mackerel, red meat, liver, egg yolks, fortified foods such as some fat spreads and breakfast cereals  - Calcium-rich diet: Dairy and soybeans, sardines, dark leafy greens like spinach, kale, turnips, and neville greens, fortified foods or orange juice  - Low-impact weight bearing exercise such as elliptical training machines, low-impact aerobics, walking (either outside or on a treadmill machine).  - Osteoporosis screening with bone density scan at 65 (or sooner if indicated)  o Other specialists according to symptoms such as gastroenterology and neurology for POTS  o In adults with normal aortic root on echocardiogram, no further monitoring is needed unless other indications are present (PMID: [id number]).     Consider a follow-up in 3-5 years for additional genetic testing.     Thank you for allowing me to participate in the care of this patient. Please do not hesitate to contact me if you have any questions.     Mary Putnam MD    Medical Geneticist  [facility]  Phone: [phone]  Fax: [phone]   Address: [address] [place] [address]  Time spent (this information is required by insurance): face-to-face 30min; preparation 5min; documentation 25min; total time spent 60min

## 2025-04-23 ENCOUNTER — APPOINTMENT (OUTPATIENT)
Dept: DERMATOLOGY | Facility: CLINIC | Age: 50
End: 2025-04-23
Payer: MEDICARE

## 2025-04-23 DIAGNOSIS — L82.1 SEBORRHEIC KERATOSIS: Primary | ICD-10-CM

## 2025-04-23 PROCEDURE — 99202 OFFICE O/P NEW SF 15 MIN: CPT | Performed by: DERMATOLOGY

## 2025-04-23 ASSESSMENT — DERMATOLOGY QUALITY OF LIFE (QOL) ASSESSMENT
DATE THE QUALITY-OF-LIFE ASSESSMENT WAS COMPLETED: 67318
RATE HOW EMOTIONALLY BOTHERED YOU ARE BY YOUR SKIN PROBLEM (FOR EXAMPLE, WORRY, EMBARRASSMENT, FRUSTRATION): 1
RATE HOW BOTHERED YOU ARE BY EFFECTS OF YOUR SKIN PROBLEMS ON YOUR ACTIVITIES (EG, GOING OUT, ACCOMPLISHING WHAT YOU WANT, WORK ACTIVITIES OR YOUR RELATIONSHIPS WITH OTHERS): 0 - NEVER BOTHERED
RATE HOW BOTHERED YOU ARE BY SYMPTOMS OF YOUR SKIN PROBLEM (EG, ITCHING, STINGING BURNING, HURTING OR SKIN IRRITATION): 1
RATE HOW EMOTIONALLY BOTHERED YOU ARE BY YOUR SKIN PROBLEM (FOR EXAMPLE, WORRY, EMBARRASSMENT, FRUSTRATION): 0 - NEVER BOTHERED
RATE HOW BOTHERED YOU ARE BY EFFECTS OF YOUR SKIN PROBLEMS ON YOUR ACTIVITIES (EG, GOING OUT, ACCOMPLISHING WHAT YOU WANT, WORK ACTIVITIES OR YOUR RELATIONSHIPS WITH OTHERS): 1
ARE THERE EXCLUSIONS OR EXCEPTIONS FOR THE QUALITY OF LIFE ASSESSMENT: NO
RATE HOW EMOTIONALLY BOTHERED YOU ARE BY YOUR SKIN PROBLEM (FOR EXAMPLE, WORRY, EMBARRASSMENT, FRUSTRATION): 0 - NEVER BOTHERED
DATE THE QUALITY-OF-LIFE ASSESSMENT WAS COMPLETED: 67318
RATE HOW BOTHERED YOU ARE BY EFFECTS OF YOUR SKIN PROBLEMS ON YOUR ACTIVITIES (EG, GOING OUT, ACCOMPLISHING WHAT YOU WANT, WORK ACTIVITIES OR YOUR RELATIONSHIPS WITH OTHERS): 0 - NEVER BOTHERED
RATE HOW BOTHERED YOU ARE BY SYMPTOMS OF YOUR SKIN PROBLEM (EG, ITCHING, STINGING BURNING, HURTING OR SKIN IRRITATION): 0 - NEVER BOTHERED
WHAT SINGLE SKIN CONDITION LISTED BELOW IS THE PATIENT ANSWERING THE QUALITY-OF-LIFE ASSESSMENT QUESTIONS ABOUT: NONE OF THE ABOVE
WHAT SINGLE SKIN CONDITION LISTED BELOW IS THE PATIENT ANSWERING THE QUALITY-OF-LIFE ASSESSMENT QUESTIONS ABOUT: NONE OF THE ABOVE
RATE HOW BOTHERED YOU ARE BY SYMPTOMS OF YOUR SKIN PROBLEM (EG, ITCHING, STINGING BURNING, HURTING OR SKIN IRRITATION): 0 - NEVER BOTHERED

## 2025-04-23 ASSESSMENT — DERMATOLOGY PATIENT ASSESSMENT
DO YOU USE SUNSCREEN: OCCASIONALLY
WHERE ARE THESE NEW OR CHANGING LESIONS LOCATED: LT THIGH
HAVE YOU HAD OR DO YOU HAVE A STAPH INFECTION: NO
ARE YOU TRYING TO GET PREGNANT: NO
DO YOU HAVE ANY NEW OR CHANGING LESIONS: YES
ARE YOU AN ORGAN TRANSPLANT RECIPIENT: NO
DO YOU HAVE IRREGULAR MENSTRUAL CYCLES: NO
DO YOU USE A TANNING BED: NO
HAVE YOU HAD OR DO YOU HAVE VASCULAR DISEASE: NO
ARE YOU ON BIRTH CONTROL: NO

## 2025-04-23 ASSESSMENT — PATIENT GLOBAL ASSESSMENT (PGA): PATIENT GLOBAL ASSESSMENT: PATIENT GLOBAL ASSESSMENT:  1 - CLEAR

## 2025-04-23 ASSESSMENT — ITCH NUMERIC RATING SCALE: HOW SEVERE IS YOUR ITCHING?: 0

## 2025-04-23 NOTE — Clinical Note
-Discussed the nature of the diagnosis  -Reassurance, recommend continued observation    - info in AVS

## 2025-04-23 NOTE — PROGRESS NOTES
Subjective     Bhavani Carr is a 50 y.o. female who presents for the following: Suspicious Skin Lesion (Lt upper thigh). Last derm visit 6/1/20 with Dr. Yang for cyst excision on back    She had a cat with her in a carrier. The carrier was covered with blankets and it was not easily visible what was inside. She did not volunteer this information when she checked in. When she was brought back to the exam room she was asked what was in the carrier at which point she informed the team that it is a kitten she is fostering and bottle feeding and could not leave at home or in the bus with her .     Since this information was discovered after the patient was already roomed, we agreed to proceed with the visit but did let the patient know that we have a policy that covers service animals. For the safety of our office and other patients we cannot always accommodate animals that are not service animals. She verbalized understanding.     Review of Systems:  No other skin or systemic complaints other than what is documented elsewhere in the note.    The following portions of the chart were reviewed this encounter and updated as appropriate:   Tobacco  Allergies  Meds  Problems  Med Hx  Surg Hx         Skin Cancer History  Biopsy Log Book  No skin cancers from Specimen Tracking.    Additional History      Specialty Problems          Dermatology Problems    Rash    Tinea corporis    Yeast infection of the skin        Objective   Well appearing patient in no apparent distress; mood and affect are within normal limits.    A focused skin examination was performed. All findings within normal limits unless otherwise noted below.    Assessment/Plan   Skin Exam  1. SEBORRHEIC KERATOSIS  Left Thigh - Anterior  Stuck on, waxy macule(s)/papule(s)/plaque(s) with comedo-like openings and milia like cysts  -Discussed the nature of the diagnosis  -Reassurance, recommend continued observation    - info in AVS    Follow up as  needed

## 2025-04-28 ENCOUNTER — APPOINTMENT (OUTPATIENT)
Dept: PRIMARY CARE | Facility: CLINIC | Age: 50
End: 2025-04-28
Payer: MEDICARE

## 2025-04-28 VITALS
WEIGHT: 148 LBS | DIASTOLIC BLOOD PRESSURE: 84 MMHG | SYSTOLIC BLOOD PRESSURE: 150 MMHG | TEMPERATURE: 97.6 F | BODY MASS INDEX: 27.51 KG/M2

## 2025-04-28 DIAGNOSIS — R68.89 FLU-LIKE SYMPTOMS: Primary | ICD-10-CM

## 2025-04-28 DIAGNOSIS — J20.9 ACUTE BRONCHITIS, UNSPECIFIED ORGANISM: ICD-10-CM

## 2025-04-28 LAB
POC RAPID INFLUENZA A: NEGATIVE
POC RAPID INFLUENZA B: NEGATIVE
POC SARS-COV-2 AG BINAX: NORMAL

## 2025-04-28 PROCEDURE — 3079F DIAST BP 80-89 MM HG: CPT | Performed by: FAMILY MEDICINE

## 2025-04-28 PROCEDURE — 87811 SARS-COV-2 COVID19 W/OPTIC: CPT | Performed by: FAMILY MEDICINE

## 2025-04-28 PROCEDURE — 3077F SYST BP >= 140 MM HG: CPT | Performed by: FAMILY MEDICINE

## 2025-04-28 PROCEDURE — 87804 INFLUENZA ASSAY W/OPTIC: CPT | Performed by: FAMILY MEDICINE

## 2025-04-28 PROCEDURE — 99213 OFFICE O/P EST LOW 20 MIN: CPT | Performed by: FAMILY MEDICINE

## 2025-04-28 RX ORDER — AZITHROMYCIN 250 MG/1
TABLET, FILM COATED ORAL
Qty: 6 TABLET | Refills: 0 | Status: SHIPPED | OUTPATIENT
Start: 2025-04-28 | End: 2025-05-03

## 2025-04-28 NOTE — PATIENT INSTRUCTIONS
I will order azithromycin for your bronchitis.  Your covid and flu tests were negative.  Return if this persists in one or two weeks.

## 2025-04-28 NOTE — PROGRESS NOTES
Subjective   Patient ID: [id number]     Bhavani Carr is a 50 y.o. female who presents for Cough, Nasal Congestion, Generalized Body Aches, and throat Irritation (Itchy throat.).  HPI  She complains of cough, nasal congestion, body aches and itchy, irritated throat.  This started Friday.    It is feeling like bronchitis to her.  She has had this before.   Objective     /84 (BP Location: Right arm, Patient Position: Sitting)   Temp 36.4 °C (97.6 °F) (Skin)   Wt 67.1 kg (148 lb)   BMI 27.51 kg/m²      Physical Exam  Constitutional:       Appearance: Normal appearance.   HENT:      Nose: No rhinorrhea.      Mouth/Throat:      Pharynx: No oropharyngeal exudate or posterior oropharyngeal erythema.   Cardiovascular:      Rate and Rhythm: Normal rate and regular rhythm.      Heart sounds: Normal heart sounds. No murmur heard.  Pulmonary:      Effort: Pulmonary effort is normal. No respiratory distress.      Breath sounds: Normal breath sounds.   Neurological:      General: No focal deficit present.      Mental Status: She is alert and oriented to person, place, and time.         Assessment/Plan   Problem List Items Addressed This Visit       Acute bronchitis    Relevant Medications    azithromycin (Zithromax) 250 mg tablet     Other Visit Diagnoses         Flu-like symptoms    -  Primary    Relevant Orders    POCT Influenza A/B manually resulted (Completed)    POCT BinaxNOW Covid-19 Ag Card manually resulted (Completed)          I will order azithromycin for your bronchitis.  Your covid and flu tests were negative.  Return if this persists in one or two weeks.    Hossein Jimenez, DO

## 2025-05-08 ENCOUNTER — ANCILLARY PROCEDURE (OUTPATIENT)
Dept: ORTHOPEDIC SURGERY | Facility: CLINIC | Age: 50
End: 2025-05-08
Payer: MEDICARE

## 2025-05-08 ENCOUNTER — OFFICE VISIT (OUTPATIENT)
Dept: ORTHOPEDIC SURGERY | Facility: CLINIC | Age: 50
End: 2025-05-08
Payer: MEDICARE

## 2025-05-08 DIAGNOSIS — M25.511 CHRONIC RIGHT SHOULDER PAIN: ICD-10-CM

## 2025-05-08 DIAGNOSIS — G89.29 CHRONIC RIGHT SHOULDER PAIN: ICD-10-CM

## 2025-05-08 DIAGNOSIS — M75.21 BICEPS TENDINITIS OF RIGHT SHOULDER: Primary | ICD-10-CM

## 2025-05-08 PROCEDURE — 73030 X-RAY EXAM OF SHOULDER: CPT | Mod: RIGHT SIDE | Performed by: PHYSICAL MEDICINE & REHABILITATION

## 2025-05-08 PROCEDURE — 99214 OFFICE O/P EST MOD 30 MIN: CPT | Performed by: PHYSICAL MEDICINE & REHABILITATION

## 2025-05-08 SDOH — SOCIAL STABILITY: SOCIAL NETWORK: SOCIAL ACTIVITY:: 7

## 2025-05-08 NOTE — PROGRESS NOTES
PM&R  / Ortho clinic eval:    IMPRESSION:  Undifferentiated hypermobility disorder with POTS  Right shoulder pain Exacerbation - probably biceps tendinitis.   Left hip pain - probably glut tendinitis with mild weakness  Right patellofemoral knee replacement 2022  H/o Spinal cord stim 2015 C2-6 - still seeing Dr Rodriguez in [place]  H/o Bilateral Carpal Tunnel release    RECOMMENDATIONS:  - personally interpreted right shoulder xrays - unremarkable except ?mild impingement  - Continue Ice 20min 3x/day  Reduce Aleve to 2 tablets twice per day - (4 tab total) - VERY important  Tylenol 650 (arthritis strength) - 3x per day PT -  Sara? Or [facility] for orthopedics  -advised stop continue exercise at [facility] x2  weeks then gradually restart- reviewed exercises in detail  -continue tizanidine and pain mgmt per Dr Rodriguez  - continue voltaren gel and lidocaine patches    f/u  - will schedule US guided Bicep injection in [facility] - she'd prefer light sedation (but she doesn't want steroid or lidocaine -suggested ropivicaine and maybe some needling) - She wants to use [place] transit - I said OK since just shoulder and we'd keep it light.   - should see Dr Rodriguez again in pain mgmt for spine or nerve pain but I'd certainly see again for joint/soft tissue pains.     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  Is here for ongoing right shoulder pain but exacerbated x 3wk ago but no specific injury.   Sharp pain in anterior shoulder, worse with reaching.  " Better with Aleve - but needed more an more - up to 8/day! Lately.  Tylenol and tizandine    Recall  diffuse pains and hypermobility - lifelong issues with pain and joint subluxations, Knees since childhood - used to play lot of sports -  Right knee eventually needed patellar resurfacing.  Then  Pain in right shoulder after getting arrested in 2013 -  ?nerve damage- had workup and lead to disability then  eventually needed spinal cord stim at C2-6 helped greatly with shoulder \"nerve\" pains.  Lost lawsuit against [employer] but still awarded disability eventually.  Lately less active due to POTS in 2024    Left hip has been popping out laterally , no full dislocations, more pain laterally.      Right knee still hurts with cold weather 10/10 for winter months, 2/10 otherwise.      Uses Aleve, Tizanidine , Trileptal for tremors, Luvox for OCD    Lost 50lb with Ozempic              Patient reports no fevers, chills, sweats, night pain, weight loss or cancer history no b/bl changes.    Pertinent Physical Exam:  MSK: Cervical Spine: ROM full, Posture non-kyphotic, Non-tender, Spurling negative   R Shoulder Exam: ROM near full but with end range pain and positive impingment. POS speed test -  Min reduced int rotation,  Scapular kinetics mildly protractedr > Left.  Tender bicep groove , more mild Pain with supraspinatus activation > rotation .  Empty/full can test minimally pos , Labral tests mildly poNeuro: Normal Sensation, strength, bulk and tone of upper s     SUPPORTING DOCUMENTATION (remaining history, exam, other findings):  Work-up reviewed - this has included xray knee, CT abd 2020 -hips OK there -personally interpreted xray knees and CT Abd for hip     Treatment has included prior PT with knee surgery and prior with shoulder and per above.     See above for Assessment and Plan    "

## 2025-05-08 NOTE — PATIENT INSTRUCTIONS
Continue Ice 20min 3x/day  Reduce Aleve to 2 tablets twice per day - (4 tab total) - VERY important  Tylenol 650 (arthritis strength) - 3x per day    PT - YVONNE Ruiz? Or [facility] orthopedics

## 2025-05-09 ENCOUNTER — TELEPHONE (OUTPATIENT)
Dept: OPERATING ROOM | Facility: CLINIC | Age: 50
End: 2025-05-09
Payer: MEDICARE

## 2025-05-09 NOTE — TELEPHONE ENCOUNTER
What code do you want for JOINT INJECTION ASPIRATION SHEATH LIGAMENT RIGHT BICEP GROOVE?    I put in need code in the subject and it pulled up cardiac arrest. I can't take it out.

## 2025-05-21 ENCOUNTER — EVALUATION (OUTPATIENT)
Dept: PHYSICAL THERAPY | Facility: CLINIC | Age: 50
End: 2025-05-21
Payer: MEDICARE

## 2025-05-21 DIAGNOSIS — G89.29 CHRONIC RIGHT SHOULDER PAIN: ICD-10-CM

## 2025-05-21 DIAGNOSIS — M25.511 CHRONIC RIGHT SHOULDER PAIN: ICD-10-CM

## 2025-05-21 DIAGNOSIS — M75.21 BICEPS TENDINITIS OF RIGHT SHOULDER: ICD-10-CM

## 2025-05-21 PROCEDURE — 97161 PT EVAL LOW COMPLEX 20 MIN: CPT | Mod: GP

## 2025-05-21 PROCEDURE — 97110 THERAPEUTIC EXERCISES: CPT | Mod: GP

## 2025-05-21 ASSESSMENT — PATIENT HEALTH QUESTIONNAIRE - PHQ9
SUM OF ALL RESPONSES TO PHQ9 QUESTIONS 1 AND 2: 0
1. LITTLE INTEREST OR PLEASURE IN DOING THINGS: NOT AT ALL
2. FEELING DOWN, DEPRESSED OR HOPELESS: NOT AT ALL

## 2025-05-21 ASSESSMENT — ENCOUNTER SYMPTOMS
LOSS OF SENSATION IN FEET: 0
DEPRESSION: 0
OCCASIONAL FEELINGS OF UNSTEADINESS: 0

## 2025-05-21 NOTE — PROGRESS NOTES
"Patient Name: Bhavani Carr  MRN: [id number]  Time Calculation  Start Time: 1400  Stop Time: 1435  Time Calculation (min): 35 min  PT Evaluation Time Entry  PT Evaluation (Low) Time Entry: 25  PT Therapeutic Procedures Time Entry  Therapeutic Exercise Time Entry: 10        Current Problem  1. Chronic right shoulder pain  Referral to Physical Therapy    Follow Up In Physical Therapy      2. 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 She notes acute onset of R shoulder pain that is unresolved over last few months. Coaches softball 2x/wk x 2 yrs. States R shoulder has bothered her since march. Attending [employer] and swimming 2x/wk with water aerobic self-prescribed exercises since February (freestyle, doggy style). Weight training with LE 2x/wk. Trialed KT tape support with mild to moderate improvement. Describes R shoulder symptoms as a constant ache. States receiving injections on  for R shoulder. Currently having difficulty with overhead activities. Main goal with therapy at this time is to return to daily activities and coaching and [employer] without limitation.    Denies numbness/tingling into R UE    PMHx: POTS, GERD    Pain:  Current:  10  High: 1010  Low: 410  Av/10     Aggravating Factors: Repetitive movement (softball pitching), throwing  Alleviating Factors: Rest, ice, heat    Reviewed medical screening form with pt and medical screening assessed    Imaging:   X-Ray R shoulder 25:  \"FINDINGS:  There is no radiographic evidence of acute fracture or dislocation  identified.  The joint spaces are well preserved without significant  degenerative changes.      IMPRESSION:  1. No evidence of " "acute fracture or dislocation.\"  Objective     Shoulder Musculoskeletal Exam    Palpation    Right      Tenderness: present        Anterior shoulder: mild        AC joint: mild        Bicipital groove: mild        Proximal biceps: mild    Left      Left shoulder palpation is normal.      Tenderness: none    Range of Motion    Right      Active ROM: abnormal and pain.       Passive ROM: abnormal and pain.       Active forward elevation: 160.       Passive forward elevation: 170.       Shoulder active abduction: 90.       Passive abduction: 100.       Active external rotation in abduction: 80.       Active internal rotation in abduction: 70.       Internal rotation: L1.     Left      Active ROM: normal and no pain.       Passive ROM: normal and no pain.       Internal rotation: L1.     Strength    Right      External rotation: 4-/5. External rotation is affected by pain.       Internal rotation: 4-/5. Internal rotation is affected by pain.       Abduction: 4-/5. Abduction is affected by pain.       Biceps: 4-/5. Biceps are affected by pain.       Triceps: 4+/5.     Left      External rotation: 4+/5.       Internal rotation: 4+/5.       Abduction: 4+/5.       Biceps: 4+/5.       Triceps: 4+/5.     Neurovascular    Right      Right shoulder nerve sensation is normal.    Left      Left shoulder nerve sensation is normal.    Scapula    Right      Scapulothoracic compress: improves pain and improves motion    Left       Left shoulder scapula is normal.    Special Tests    Right    Rotator Cuff Signs      Neer's test: positive       Payne test: positive       External rotation lag sign: positive       Belly press test: negative       Painful arc test: positive       Lift-off sign: negative       Bear hug test: negative       Drop arm test: negative     Biceps/nataliia Signs      Speed's test: positive     Special tests additional comments: Adrian's R/L: +/-  Bicep Load II R/L: +/-      Outcome Measures:  QuickDASH:  47.73% " (32 raw score)     Treatment: See HEP below  Iso ER x 10 sec x10 vs YTB  Shoulder blade push-up x12  Dowel overhead reach plus flexion x12 5 sec hold  Iso bicep curl x10-15 sec and eccentric lower x 5sec vs YTB x12    EDUCATION/HEP:  Pt educated on POC and expected recovery timeline. Pt instructed on scapular mechanics and carryover with improving RTC strength and periscapular strength to improve overall scapulohumeral mechanics and stability. Pt acknowledged good understanding and was in agreement to all above information.     Assessment:   Pt is a 50 y.o female coming to clinic with primary complaint of R shoulder pain and s/s consistent with RTC and bicep tendinopathy . On exam is demonstrating  decreased strength, ROM, and positive testing for bicep tendinopathy. These deficits have lead to functional impairments with lifting/carrying objects/sleeping/home care/self care/driving/participation in leisure activities . Recommend skilled PT to address the aforementioned deficits and allow pt to restore PLOF and maximize functional capacity. Anticipate fair to good prognosis given acute on chronic nature, condition, activity level, experience with therapy in past. Patient would benefit from PT services to address current impairments and facilitate improvement in current activity limits. Educated patient on current POC, initial HEP and current examination findings. Patient verbalized understanding of all education and instruction provided today.      Clinical Presentation: Stable     Level of Complexity: Low     Goals:      Patient will improve L/R shoulder flexion strength to >/=4+/5 for improved overhead lifting performance.    Patient will improve L/R shoulder abduction strength to >/=4+/5 for improved overhead lifting.    Patient will improve L/R shoulder ER strength to >/=4+/5 for improved shoulder stability with ADL performance.    Patient will improve L/R shoulder IR strength to >/=4+/5 for improved shoulder  stability with ADL performance.    Patient will improve L/R shoulder flexion AROM to >/=0-160 deg for improved overhead shoulder mobility with reaching.    Patient will improve L/R shoulder abduction AROM to >/=0-160 deg for improved lateral shoulder mobility.    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    Patient will report </=1/10 shoulder pain with ADL performance.       Plan  1x/week for 5 visits     Planned interventions include: aquatic therapy, biofeedback, cryotherapy, dry needling, edema control, education/instruction, electrical stimulation, gait training, home program, hot pack, kinesiotaping, manual therapy, neuromuscular re-education, self care/home management, therapeutic activities, therapeutic exercises, ultrasound and vasopneumatic device w/ cold.

## 2025-06-02 ENCOUNTER — APPOINTMENT (OUTPATIENT)
Dept: PHYSICAL THERAPY | Facility: CLINIC | Age: 50
End: 2025-06-02
Payer: MEDICARE

## 2025-06-03 ENCOUNTER — APPOINTMENT (OUTPATIENT)
Dept: PRIMARY CARE | Facility: CLINIC | Age: 50
End: 2025-06-03
Payer: MEDICARE

## 2025-06-03 VITALS
TEMPERATURE: 97.7 F | BODY MASS INDEX: 28.18 KG/M2 | WEIGHT: 151.6 LBS | SYSTOLIC BLOOD PRESSURE: 124 MMHG | DIASTOLIC BLOOD PRESSURE: 60 MMHG

## 2025-06-03 DIAGNOSIS — E11.9 TYPE 2 DIABETES MELLITUS WITHOUT COMPLICATIONS: ICD-10-CM

## 2025-06-03 DIAGNOSIS — E11.9 TYPE 2 DIABETES MELLITUS WITHOUT COMPLICATION, WITHOUT LONG-TERM CURRENT USE OF INSULIN: Primary | ICD-10-CM

## 2025-06-03 DIAGNOSIS — E11.9 DIABETES MELLITUS TYPE 2 WITHOUT RETINOPATHY (MULTI): ICD-10-CM

## 2025-06-03 DIAGNOSIS — R55 SYNCOPE, UNSPECIFIED SYNCOPE TYPE: ICD-10-CM

## 2025-06-03 DIAGNOSIS — F42.9 OBSESSIVE-COMPULSIVE DISORDER, UNSPECIFIED TYPE: ICD-10-CM

## 2025-06-03 DIAGNOSIS — G90.A POTS (POSTURAL ORTHOSTATIC TACHYCARDIA SYNDROME): ICD-10-CM

## 2025-06-03 DIAGNOSIS — I95.1 AUTONOMIC ORTHOSTATIC HYPOTENSION: ICD-10-CM

## 2025-06-03 DIAGNOSIS — E66.3 OVERWEIGHT (BMI 25.0-29.9): ICD-10-CM

## 2025-06-03 DIAGNOSIS — M54.12 RADICULOPATHY, CERVICAL REGION: ICD-10-CM

## 2025-06-03 PROCEDURE — 3074F SYST BP LT 130 MM HG: CPT | Performed by: FAMILY MEDICINE

## 2025-06-03 PROCEDURE — 3078F DIAST BP <80 MM HG: CPT | Performed by: FAMILY MEDICINE

## 2025-06-03 PROCEDURE — 99214 OFFICE O/P EST MOD 30 MIN: CPT | Performed by: FAMILY MEDICINE

## 2025-06-03 RX ORDER — FLUDROCORTISONE ACETATE 0.1 MG/1
0.1 TABLET ORAL DAILY
Qty: 90 TABLET | Refills: 1 | Status: SHIPPED | OUTPATIENT
Start: 2025-06-03 | End: 2025-11-30

## 2025-06-03 RX ORDER — SEMAGLUTIDE 1.34 MG/ML
1 INJECTION, SOLUTION SUBCUTANEOUS
Qty: 9 ML | Refills: 1 | Status: SHIPPED | OUTPATIENT
Start: 2025-06-03

## 2025-06-03 RX ORDER — ATORVASTATIN CALCIUM 40 MG/1
40 TABLET, FILM COATED ORAL NIGHTLY
Qty: 90 TABLET | Refills: 1 | Status: SHIPPED | OUTPATIENT
Start: 2025-06-03

## 2025-06-03 RX ORDER — FLUVOXAMINE MALEATE 25 MG/1
12.5 TABLET ORAL NIGHTLY
Qty: 45 TABLET | Refills: 1 | Status: SHIPPED | OUTPATIENT
Start: 2025-06-03

## 2025-06-03 ASSESSMENT — PATIENT HEALTH QUESTIONNAIRE - PHQ9
SUM OF ALL RESPONSES TO PHQ9 QUESTIONS 1 AND 2: 0
2. FEELING DOWN, DEPRESSED OR HOPELESS: NOT AT ALL
1. LITTLE INTEREST OR PLEASURE IN DOING THINGS: NOT AT ALL

## 2025-06-03 NOTE — PROGRESS NOTES
Subjective   Patient ID: [id number]     Bhavani Carr is a 50 y.o. female who presents for Follow-up.  HPI  She is here for a recheck.      She has diabetes.  She has been losing weight on semaglutide but her weight is presently up  three pounds from last time.    Her last A1c from March 20 was 5.9.     BP today is 124/80.     She has been paralyzed after using lidocaine.  She could not move her arms or neck.  She states she could move her legs.      She had paralysis from the waist down after a shot in the knee.      Tobacco Use: High Risk (6/3/2025)    Patient History     Smoking Tobacco Use: Former     Smokeless Tobacco Use: Current     Passive Exposure: Not on file     She stopped smoking cigarettes three to four years ago.      Objective     /60 (BP Location: Right arm, Patient Position: Sitting)   Temp 36.5 °C (97.7 °F)   Wt 68.8 kg (151 lb 9.6 oz)   BMI 28.18 kg/m²      Physical Exam  Constitutional:       Appearance: Normal appearance.   Cardiovascular:      Rate and Rhythm: Normal rate and regular rhythm.      Heart sounds: Normal heart sounds. No murmur heard.  Pulmonary:      Effort: Pulmonary effort is normal. No respiratory distress.      Breath sounds: Normal breath sounds.   Neurological:      General: No focal deficit present.      Mental Status: She is alert and oriented to person, place, and time.         Assessment/Plan   Problem List Items Addressed This Visit       Diabetes mellitus type 2 without retinopathy (Multi)    Relevant Medications    semaglutide (Ozempic) 1 mg/dose (4 mg/3 mL) pen injector    POTS (postural orthostatic tachycardia syndrome)    Relevant Medications    fludrocortisone (Florinef) 0.1 mg tablet    Syncope, unspecified syncope type    Relevant Medications    fludrocortisone (Florinef) 0.1 mg tablet     Other Visit Diagnoses         Type 2 diabetes mellitus without complication, without long-term current use of insulin    -  Primary      Overweight (BMI 25.0-29.9)      "   Relevant Medications    semaglutide (Ozempic) 1 mg/dose (4 mg/3 mL) pen injector      Type 2 diabetes mellitus without complications        Relevant Medications    atorvastatin (Lipitor) 40 mg tablet      Radiculopathy, cervical region          Autonomic orthostatic hypotension        Relevant Medications    fludrocortisone (Florinef) 0.1 mg tablet      Obsessive-compulsive disorder, unspecified type        Relevant Medications    fLuvoxaMINE (Luvox) 25 mg tablet          I refilled your medication.  Continue to maintain a healthy diet and regular exercise.  Return in three months for a recheck.  I will write a letter stating that you have significant adverse reactions to lidocaine (paralysis) and that the  risk is transferred to other \"caridad\" medication as well.    Hossein Jimenez, DO   "

## 2025-06-03 NOTE — LETTER
"Betty 3, 2025     Patient: Bhavani Carr   YOB: 1975   Date of Visit: 6/3/2025       To Whom It May Concern:    Bhavani Carr was seen in my clinic on 6/3/2025 at 2:50 pm. She has had two episodes of paralysis after the use of lidocaine in her past.  The risk of temporary paralysis is about the same among all of the local \"caridad\" anesthetics.  Therefore, I recommend alternative modalities to lidocaine and similar medications, such as general anesthesia if needed for dental procedures.    If you have any questions or concerns, please don't hesitate to call.         Sincerely,         Hossein Jimenez,         CC: No Recipients  "
decreased strength

## 2025-06-03 NOTE — PATIENT INSTRUCTIONS
"  I refilled your medication.  Continue to maintain a healthy diet and regular exercise.  Return in three months for a recheck.  I will write a letter stating that you have significant adverse reactions to lidocaine (paralysis) and that the  risk is transferred to other \"caridad\" medication as well.    "

## 2025-06-04 ENCOUNTER — TREATMENT (OUTPATIENT)
Dept: PHYSICAL THERAPY | Facility: CLINIC | Age: 50
End: 2025-06-04
Payer: MEDICARE

## 2025-06-04 DIAGNOSIS — M62.830 PARASPINAL MUSCLE SPASM: ICD-10-CM

## 2025-06-04 PROCEDURE — 97110 THERAPEUTIC EXERCISES: CPT | Mod: GP

## 2025-06-04 PROCEDURE — 97140 MANUAL THERAPY 1/> REGIONS: CPT | Mod: GP

## 2025-06-04 NOTE — PROGRESS NOTES
"Patient Name: Bhavani Carr  MRN: [id number]  Time Calculation  Start Time: 1410  Stop Time: 1445  Time Calculation (min): 35 min     PT Therapeutic Procedures Time Entry  Manual Therapy Time Entry: 15  Therapeutic Exercise Time Entry: 20        Current Problem  1. Paraspinal muscle spasm  Follow Up In Physical Therapy        Insurance    Visit #2/5  ANTHEM DUAL BOA COPAY 0 DED (MET)  COVERAGE 80 OOP [id number]) AUTH REQ       Subjective     General:   **Session limited as pt arrived 10 minutes late to session.    Pt notes shoulder is \"eh\" today and is looking forward to upcoming injections on 6/12. She notes no change in symptoms since initial visit but states improved functioning in aquatic aerobics performed at [facility].      Pain:  Current:  4/10      Objective   Treatment:   Therapeutic Exercise 15211  Iso ER + bench press 2x15 vs YTB  Dowel overhead 4# reach plus flexion 2x12 5 sec hold  Seated D2 flexion vs YTB 2x12 (added to HEP)  Seated horiz. Abd. 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 Pt was encouraged to maintain slow and controlled focus with HEP and new exercises.    Assessment:   **Session limited as pt arrived 10 minutes late to session.    Pt tolerated session well but is still limited with abduction and overhead motions noted with some pain at mid to end range. She benefited from distraction and manual therapy but still demonstrated slight discomfort with movement above shoulder height. She tolerated all waist to shoulder height level activities well. She will continue to benefit from " therapy focusing on global stability and strength in overhead motions.    Goals:      Patient will improve L/R shoulder flexion strength to >/=4+/5 for improved overhead lifting performance.    Patient will improve L/R shoulder abduction strength to >/=4+/5 for improved overhead lifting.    Patient will improve L/R shoulder ER strength to >/=4+/5 for improved shoulder stability with ADL performance.    Patient will improve L/R shoulder IR strength to >/=4+/5 for improved shoulder stability with ADL performance.    Patient will improve L/R shoulder flexion AROM to >/=0-160 deg for improved overhead shoulder mobility with reaching.    Patient will improve L/R shoulder abduction AROM to >/=0-160 deg for improved lateral shoulder mobility.    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    Patient will report </=1/10 shoulder pain with ADL performance.       Plan  1x/week for 5 visits     Planned interventions include: aquatic therapy, biofeedback, cryotherapy, dry needling, edema control, education/instruction, electrical stimulation, gait training, home program, hot pack, kinesiotaping, manual therapy, neuromuscular re-education, self care/home management, therapeutic activities, therapeutic exercises, ultrasound and vasopneumatic device w/ cold.

## 2025-06-11 ENCOUNTER — TELEPHONE (OUTPATIENT)
Dept: ORTHOPEDIC SURGERY | Facility: CLINIC | Age: 50
End: 2025-06-11
Payer: MEDICARE

## 2025-06-11 ENCOUNTER — TREATMENT (OUTPATIENT)
Dept: PHYSICAL THERAPY | Facility: CLINIC | Age: 50
End: 2025-06-11
Payer: MEDICARE

## 2025-06-11 DIAGNOSIS — M62.830 PARASPINAL MUSCLE SPASM: ICD-10-CM

## 2025-06-11 PROCEDURE — 97140 MANUAL THERAPY 1/> REGIONS: CPT | Mod: GP

## 2025-06-11 PROCEDURE — 97110 THERAPEUTIC EXERCISES: CPT | Mod: GP

## 2025-06-11 NOTE — PROGRESS NOTES
Patient Name: Bhavani Carr  MRN: [id number]  Time Calculation  Start Time: 1440  Stop Time: 1518  Time Calculation (min): 38 min     PT Therapeutic Procedures Time Entry  Therapeutic Exercise Time Entry: 23  Manual Therapy Time Entry: 15        Current Problem  1. Paraspinal muscle spasm  Follow Up In Physical Therapy        Insurance    Visit #3/5  ANTHEM DUAL BOA COPAY 0 DED (MET)  COVERAGE 80 OOP [id number]) AUTH REQ       Subjective     General:   Pt notes not attending [employer] or swimming since last visit and stating some improvement since. She states she is receiving R shoulder injection tomorrow. Otherwise, no new concerns noted today.    Pain:  Current:  3-4/10      Objective   Treatment:   Therapeutic Exercise 76992  Iso ER + bench press + overhead reach 2x15 vs YTB  Standing D2 flexion vs YTB 2x12   Seated horiz. Abd. Vs RTB 2x12 3 sec hold   Standing wall ball up/down and M-L rolls x10 ea way  shoulder flex + iso ER vs YTB 2x10  Inverted kettlebell shoulder press 5# 2x10  Unilateral Arnold press 5# x6   - Discontinued d/t pain  Wall clock vs YTB 2x5    Manual [id number]:  Manual PROM R shoulder 8 min  Gr III-IV R GHJ A-P mobilizations  Gr III-IV R GHJ inferior mobilizations at 90 deg abd  R GHJ distraction x 1min  PROM into R shoulder abduction with manual inferior glide 2x10    EDUCATION/HEP:  Pt was encouraged to continue with current HEP and frequency. She was instructed to continue to use heat or ice as needed to manage pain. 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 Pt acknowledged good understanding and was in agreement to all above information.      Assessment:   Pt tolerated session well and noted some improvement with joint mobilizations this date. She demonstrated great relief with A-P mobilizations this date and required frequent returns to A-P mobilizations to lessen symptoms after exercising. She continues to demonstrate overhead motion intolerance but  demonstrates generally improved strength and stability from waist to shoulder level. She will continue to benefit from therapy focusing on global stability and strength in overhead motions.    Goals:      Patient will improve L/R shoulder flexion strength to >/=4+/5 for improved overhead lifting performance.    Patient will improve L/R shoulder abduction strength to >/=4+/5 for improved overhead lifting.    Patient will improve L/R shoulder ER strength to >/=4+/5 for improved shoulder stability with ADL performance.    Patient will improve L/R shoulder IR strength to >/=4+/5 for improved shoulder stability with ADL performance.    Patient will improve L/R shoulder flexion AROM to >/=0-160 deg for improved overhead shoulder mobility with reaching.    Patient will improve L/R shoulder abduction AROM to >/=0-160 deg for improved lateral shoulder mobility.    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    Patient will report </=1/10 shoulder pain with ADL performance.       Plan  1x/week for 5 visits     Planned interventions include: aquatic therapy, biofeedback, cryotherapy, dry needling, edema control, education/instruction, electrical stimulation, gait training, home program, hot pack, kinesiotaping, manual therapy, neuromuscular re-education, self care/home management, therapeutic activities, therapeutic exercises, ultrasound and vasopneumatic device w/ cold.

## 2025-06-11 NOTE — TELEPHONE ENCOUNTER
This patient is scheduled with Dr. James for 6/12 for an injection, I called her as the  did not book her a follow up appt.  I LVM for her to call back and schedule this with Dr. James or Dr. Mallory.

## 2025-06-12 ENCOUNTER — HOSPITAL ENCOUNTER (OUTPATIENT)
Dept: OPERATING ROOM | Facility: CLINIC | Age: 50
Discharge: HOME | End: 2025-06-12
Payer: MEDICARE

## 2025-06-12 VITALS
RESPIRATION RATE: 16 BRPM | BODY MASS INDEX: 27.34 KG/M2 | SYSTOLIC BLOOD PRESSURE: 123 MMHG | HEIGHT: 62 IN | DIASTOLIC BLOOD PRESSURE: 62 MMHG | TEMPERATURE: 96.8 F | OXYGEN SATURATION: 100 % | WEIGHT: 148.59 LBS | HEART RATE: 63 BPM

## 2025-06-12 DIAGNOSIS — M75.21 BICEPS TENDINITIS OF RIGHT SHOULDER: ICD-10-CM

## 2025-06-12 PROCEDURE — 96372 THER/PROPH/DIAG INJ SC/IM: CPT | Performed by: STUDENT IN AN ORGANIZED HEALTH CARE EDUCATION/TRAINING PROGRAM

## 2025-06-12 PROCEDURE — 3600000001 HC OR TIME - INITIAL BASE CHARGE - PROCEDURE LEVEL ONE

## 2025-06-12 PROCEDURE — 2500000004 HC RX 250 GENERAL PHARMACY W/ HCPCS (ALT 636 FOR OP/ED): Performed by: STUDENT IN AN ORGANIZED HEALTH CARE EDUCATION/TRAINING PROGRAM

## 2025-06-12 PROCEDURE — 7100000009 HC PHASE TWO TIME - INITIAL BASE CHARGE

## 2025-06-12 PROCEDURE — 2500000005 HC RX 250 GENERAL PHARMACY W/O HCPCS: Performed by: STUDENT IN AN ORGANIZED HEALTH CARE EDUCATION/TRAINING PROGRAM

## 2025-06-12 PROCEDURE — 3600000006 HC OR TIME - EACH INCREMENTAL 1 MINUTE - PROCEDURE LEVEL ONE

## 2025-06-12 PROCEDURE — 7100000010 HC PHASE TWO TIME - EACH INCREMENTAL 1 MINUTE

## 2025-06-12 RX ORDER — MIDAZOLAM HYDROCHLORIDE 1 MG/ML
INJECTION, SOLUTION INTRAMUSCULAR; INTRAVENOUS AS NEEDED
Status: COMPLETED | OUTPATIENT
Start: 2025-06-12 | End: 2025-06-12

## 2025-06-12 RX ORDER — ROPIVACAINE HYDROCHLORIDE 5 MG/ML
INJECTION, SOLUTION EPIDURAL; INFILTRATION; PERINEURAL AS NEEDED
Status: COMPLETED | OUTPATIENT
Start: 2025-06-12 | End: 2025-06-12

## 2025-06-12 RX ORDER — SODIUM BICARBONATE 42 MG/ML
INJECTION, SOLUTION INTRAVENOUS AS NEEDED
Status: COMPLETED | OUTPATIENT
Start: 2025-06-12 | End: 2025-06-12

## 2025-06-12 RX ORDER — SODIUM CHLORIDE 9 MG/ML
INJECTION, SOLUTION INTRAMUSCULAR; INTRAVENOUS; SUBCUTANEOUS AS NEEDED
Status: COMPLETED | OUTPATIENT
Start: 2025-06-12 | End: 2025-06-12

## 2025-06-12 RX ADMIN — SODIUM CHLORIDE 10 ML: 9 INJECTION INTRAMUSCULAR; INTRAVENOUS; SUBCUTANEOUS at 13:01

## 2025-06-12 RX ADMIN — ROPIVACAINE HYDROCHLORIDE 10 ML: 5 INJECTION, SOLUTION EPIDURAL; INFILTRATION; PERINEURAL at 13:00

## 2025-06-12 RX ADMIN — MIDAZOLAM 1 MG: 1 INJECTION INTRAMUSCULAR; INTRAVENOUS at 12:59

## 2025-06-12 RX ADMIN — SODIUM BICARBONATE 0.5 MEQ: 42 INJECTION, SOLUTION INTRAVENOUS at 13:02

## 2025-06-12 ASSESSMENT — PAIN SCALES - GENERAL
PAINLEVEL_OUTOF10: 0 - NO PAIN
PAINLEVEL_OUTOF10: 4

## 2025-06-12 ASSESSMENT — PAIN - FUNCTIONAL ASSESSMENT
PAIN_FUNCTIONAL_ASSESSMENT: 0-10

## 2025-06-12 ASSESSMENT — COLUMBIA-SUICIDE SEVERITY RATING SCALE - C-SSRS
6. HAVE YOU EVER DONE ANYTHING, STARTED TO DO ANYTHING, OR PREPARED TO DO ANYTHING TO END YOUR LIFE?: NO
2. HAVE YOU ACTUALLY HAD ANY THOUGHTS OF KILLING YOURSELF?: NO
1. IN THE PAST MONTH, HAVE YOU WISHED YOU WERE DEAD OR WISHED YOU COULD GO TO SLEEP AND NOT WAKE UP?: NO

## 2025-06-12 NOTE — DISCHARGE INSTRUCTIONS
Today you underwent tenotomy to right bicep. Below is the information that we would  like for you to be aware of over the next few days.    How long will the numbing last?  You received 1 medications in your injection, an anesthetic.   The anesthetic will last for a short period of time. When it wears off, the pain will return.     What are my discharge instructions?   Rest as much as possible the first 24 hours.   Keep your fingers moving as much as possible/comfortable, while awake. Exercise your  fingers, wrist, elbow and shoulder hourly while awake. This keeps swelling down.   You may use ice but never apply it directly to the skin and never leave on for more than  20 minutes per hour.   You may have increased pain tonight and during the next few days. You may use over  the counter Tylenol or (after 24 hours) NSAIDs for pain relief.   or the next 48 hours: Showers only. No baths, hot tubs, swimming, steam rooms, or saunas.   Perform only light and sedentary activity until 2 weeks after the procedure. Resume  routine activities between 2 to 4 weeks. Resume full activities after 4 weeks   You may resume your normal diet and prescription medications    If you received conscious sedation:   You may resume your normal diet.   Do not engage in activities that require alertness or coordination. This includes:  o Driving  o Operating heavy machinery  o Using power tools  o Cooking  o Climbing  o Signing any legal documents/make legal decisions.   Do not drink alcoholic beverages for 24 hours.    When should I call for help?  Please call if you have any of these symptoms:   Severe back pain   Fever (> 100.4)   Severe headache (gets better lying down)   Persistent nausea or vomiting   New numbness in legs/arms    Severe redness, swelling, red streaks, drainage at injection site   Loss of bowel or bladder control    What is the contact information?   Monday through Friday 8am-5pm: [phone]   On evenings, weekends, or  holidays call the Physical Medicine and  Rehabilitation on call doctor: [phone]    In case of emergency, call 911, or go to the nearest emergency department.  Take this form with you.

## 2025-06-16 NOTE — H&P
History Of Present Illness  Bhavani Carr is a 50 y.o. female presents for procedure state below. Endorses no changes in past medical history or medical health since last seen in clinic.     Past Medical History  She has a past medical history of Anxiety, Arthritis, Chronic headaches, Depression, Hypermetropia, right eye (03/16/2017), Hypermetropia, right eye (03/16/2017), Hypermetropia, right eye (03/16/2017), Hypermetropia, right eye (03/16/2017), OCD (obsessive compulsive disorder), Personal history of diseases of the blood and blood-forming organs and certain disorders involving the immune mechanism (06/06/2015), Personal history of diseases of the blood and blood-forming organs and certain disorders involving the immune mechanism (07/16/2020), Personal history of other endocrine, nutritional and metabolic disease (07/18/2019), Personal history of urinary (tract) infections (06/30/2015), POTS (postural orthostatic tachycardia syndrome), S/P insertion of spinal cord stimulator, Type 2 diabetes mellitus, and Unilateral primary osteoarthritis, right knee (12/20/2022).    Surgical History  She has a past surgical history that includes Eye surgery (04/17/2014); Knee surgery (04/17/2014); Other surgical history (04/17/2014); Tubal ligation (02/27/2015); Ankle surgery (12/04/2017); Other surgical history (10/11/2019); Other surgical history (07/21/2020); Other surgical history (01/01/2023); Other surgical history (08/17/2020); CT angio neck (09/28/2022); CT angio head w and wo IV contrast (09/28/2022); Carpal tunnel release (Bilateral); Spinal cord stimulator implant; Hysterectomy; and Ankle arthroplasty.     Social History  She reports that she has quit smoking. Her smoking use included cigarettes. She uses smokeless tobacco. She reports current alcohol use. She reports that she does not currently use drugs after having used the following drugs: Marijuana.    Family History  Family History[1]     Allergies  Yellow  jacket venom, Bee venom protein (honey bee), Fluzone quad 9528-0342 (pf) [flu vacc [id number] 6mos up(pf)], Guaifenesin, Guaifenex, Influenza virus vac. tri-split, Influenza virus vaccine whole, Lidocaine, Metoprolol, Morphine, and Iodinated contrast media    Review of Symptoms:   Constitutional: Negative for chills, diaphoresis or fever  HENT: Negative for neck swelling  Eyes:.  Negative for eye pain  Respiratory:.  Negative for cough, shortness of breath or wheezing    Cardiovascular:.  Negative for chest pain or palpitations  Gastrointestinal:.  Negative for abdominal pain, nausea and vomiting  Genitourinary:.  Negative for urgency  Musculoskeletal:  Positive for right shoulder pain. Positive for joint pain. Denies falls within the past 3 months.  Skin: Negative for wounds or itching   Neurological: Negative for dizziness, seizures, loss of consciousness and weakness  Endo/Heme/Allergies: Does not bruise/bleed easily  Psychiatric/Behavioral: Negative for depression. The patient does not appear anxious.       PHYSICAL EXAM  Vitals signs reviewed  Constitutional:       General: Not in acute distress     Appearance: Normal appearance. Not ill-appearing.  HENT:     Head: Normocephalic and atraumatic  Eyes:     Conjunctiva/sclera: Conjunctivae normal  Cardiovascular:     Rate and Rhythm: Normal rate and regular rhythm  Pulmonary:     Effort: No respiratory distress  Abdominal:     Palpations: Abdomen is soft  Musculoskeletal: JOSEPH  Skin:     General: Skin is warm and dry  Neurological:     General: No focal deficit present  Psychiatric:         Mood and Affect: Mood normal         Behavior: Behavior normal     Last Recorded Vitals  /62   Pulse 63   Temp 36 °C (96.8 °F) (Temporal)   Resp 16   Wt 67.4 kg (148 lb 9.4 oz)   SpO2 100%     Relevant Results  Current Outpatient Medications   Medication Instructions    ascorbic acid, vitamin C, 100 mg chewable tablet Chew.    atorvastatin (LIPITOR) 40 mg, oral,  Nightly    biotin 10,000 mcg capsule Take by mouth.    cholecalciferol (Vitamin D-3) 50 mcg (2,000 unit) capsule Take by mouth.    cyanocobalamin (Vitamin B-12) 50 mcg tablet Take by mouth.    EPINEPHrine 0.3 mg/0.3 mL injection syringe as directed for acute allergic reaction    fludrocortisone (FLORINEF) 0.1 mg, oral, Daily    fLuvoxaMINE (LUVOX) 12.5 mg, oral, Nightly    medical cannabis 1 each    midodrine (Proamatine) 5 mg tablet TAKE 1 TABLET BY MOUTH 3 TIMES DAILY (MORNING, MIDDAY, AND LATE AFTERNOON).    multivitamin with folic acid (Daily-Mala, with folic acid,) 400 mcg tablet 1 tablet, Daily    omeprazole (PRILOSEC) 10 mg, oral, Daily    OneTouch Delica Plus Lancet 1 Units, Not Applicable, 3 times daily, Please fill with lancets compatible with One Touch Verio Glucometer Kit.    OneTouch Verio Reflect Meter misc USE AS DIRECTED TO MONITOR BLOOD GLUCOSE    OneTouch Verio test strips strip USE TO TEST ONCE DAILY    OXcarbazepine (TRILEPTAL) 300 mg, Nightly    Ozempic 1 mg, subcutaneous, Once Weekly    prasterone, dhea, 10 mg tablet Take by mouth.    tiZANidine (ZANAFLEX) 4 mg, 4 times daily PRN         XR cervical spine 2-3 views 01/10/2024    Narrative  EXAMINATION:  3 XRAY VIEWS OF THE CERVICAL SPINE    1/10/2024 5:54 am    COMPARISON:  08/06/2021    HISTORY:  ORDERING SYSTEM PROVIDED HISTORY: eval lead placement  TECHNOLOGIST PROVIDED HISTORY:  Reason for exam:->eval lead placement  Is the patient pregnant?->No  What reading provider will be dictating this exam?->CRC    FINDINGS:  Spinal stimulator leads in the posterior spinal canal, with tip at the level  of the C2 spinous process.    All 7 cervical vertebrae are visualized and appear normal in height and  alignment.   There is no evidence of prevertebral soft tissue edema or  fracture.  The base of the odontoid appears intact.    Impression  Spinal stimulator leads with tip at the level of the C2 spinous process.     No image results found.       1.  Biceps tendinitis of right shoulder  Joint Injection/Aspiration    Joint Injection/Aspiration    US guided pain procedure    US guided pain procedure           ASSESSMENT/PLAN  Bhavani Carr is a 50 y.o. female presents for right biceps tenotomy    Our plan is as follows:  - Follow In clinic  - Continue to participate in physical therapy as well as physician directed home exercises  - Continue pain medications as prescribed       Khadijah James DO         [1]   Family History  Problem Relation Name Age of Onset    Diabetes Mother      Hypertension Mother      Stroke Mother      Coronary artery disease Father      Other (cardiac disorder) Father      Hypertension Sister      Other (pituitary tumor) Sister      Immunodeficiency Brother      Diabetes Mother's Sister      Breast cancer Father's Sister      Stroke Father's Sister      Diabetes Maternal Grandmother

## 2025-06-18 ENCOUNTER — TREATMENT (OUTPATIENT)
Dept: PHYSICAL THERAPY | Facility: CLINIC | Age: 50
End: 2025-06-18
Payer: MEDICARE

## 2025-06-18 DIAGNOSIS — M62.830 PARASPINAL MUSCLE SPASM: ICD-10-CM

## 2025-06-18 PROCEDURE — 97140 MANUAL THERAPY 1/> REGIONS: CPT | Mod: GP

## 2025-06-18 PROCEDURE — 97110 THERAPEUTIC EXERCISES: CPT | Mod: GP

## 2025-06-18 NOTE — PROGRESS NOTES
"Patient Name: Bhavani Carr  MRN: [id number]  Time Calculation  Start Time: 1336  Stop Time: 1410  Time Calculation (min): 34 min     PT Therapeutic Procedures Time Entry  Therapeutic Exercise Time Entry: 20  Manual Therapy Time Entry: 14        Current Problem  1. Paraspinal muscle spasm  Follow Up In Physical Therapy        Insurance    Visit #4/5  ANTHEM DUAL BOA COPAY 0 DED (MET)  COVERAGE 80 OOP [id number]) AUTH REQ       Subjective     General:   Pt notes arm is doing \"fantastic\" after recent pain injection on 6/12/25. She notes abiding by all protocols and precautions from pain management MD:    \" Perform only light and sedentary activity until 2 weeks after the procedure. Resume routine activities between 2 to 4 weeks. Resume full activities after 4 weeks\"    She notes she is confident she can slowly progress to HEP independently over next 2 weeks before re-check 7/2/25.      Pain:  Current:  0/10      Objective   Treatment:   Therapeutic Exercise 22410  UBE F/R 2'/2'  Dowel chest press + overhead reach 2x15 5 sec end-range hold  Wall slide flexion 2x15  Wall slide scaption 2x15  (added to HEP)  Rows vs RTB 2x15    Manual 22185:  Manual PROM R shoulder 8 min  Gr III-IV R GHJ A-P mobilizations  Gr III-IV R GHJ inferior mobilizations at 90 deg abd  R GHJ distraction x 1min  PROM into R shoulder abduction with manual inferior glide 2x10    Perform NV as re-assessment:  Standing D2 flexion vs YTB 2x12   Seated horiz. Abd. Vs RTB 2x12 3 sec hold   Standing wall ball up/down and M-L rolls x10 ea way  shoulder flex + iso ER vs YTB 2x10  Inverted kettlebell shoulder press 5# 2x10  Unilateral Arnold press 5# x6   - Discontinued d/t pain  Wall clock vs YTB 2x5        EDUCATION/HEP:  Pt encouraged to abide by pain management protocol. She was encouraged to continue with daily light movement as tolerated and bring in very light resistance exercises in every other to every 2 days with only 1-2 exercises to slowly return to " some strengthening as mentioned in MD orders. She was encouraged to focus on pain-free motion and ensure appropriate rest as well as divide current regimen in lap pool with pool dumbbells in half with rest in between along with waiting to partake until cleared with protocol. Pt acknowledged good understanding and was in agreement to all above information.      Assessment:   Pt tolerated session well and demonstrated no increase in pain and demonstrated full AAROM and PROM with no pain this date. She demonstrated great tolerance to exercises to be performed in interim to abide by MD protocol. She will continue to benefit from therapy addressing global shoulder strength and stability.    Goals:      Patient will improve L/R shoulder flexion strength to >/=4+/5 for improved overhead lifting performance.    Patient will improve L/R shoulder abduction strength to >/=4+/5 for improved overhead lifting.    Patient will improve L/R shoulder ER strength to >/=4+/5 for improved shoulder stability with ADL performance.    Patient will improve L/R shoulder IR strength to >/=4+/5 for improved shoulder stability with ADL performance.    Patient will improve L/R shoulder flexion AROM to >/=0-160 deg for improved overhead shoulder mobility with reaching.    Patient will improve L/R shoulder abduction AROM to >/=0-160 deg for improved lateral shoulder mobility.    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    Patient will report </=1/10 shoulder pain with ADL performance.       Plan  1x/week for 5 visits. Perform re-check NV.    Planned interventions include: aquatic therapy, biofeedback, cryotherapy, dry needling, edema control, education/instruction, electrical stimulation, gait training, home program, hot pack, kinesiotaping,  manual therapy, neuromuscular re-education, self care/home management, therapeutic activities, therapeutic exercises, ultrasound and vasopneumatic device w/ cold.

## 2025-06-25 ENCOUNTER — APPOINTMENT (OUTPATIENT)
Dept: PHYSICAL THERAPY | Facility: CLINIC | Age: 50
End: 2025-06-25
Payer: MEDICARE

## 2025-06-26 NOTE — LETTER
12/31/24    Hossein Jimenez DO  [address] [address]  [place] [address]      Dear Dr. Hossein Jimenez DO,    I am writing to confirm that your patient, Bhavani Carr  received care in my office on 12/31/24. I have enclosed a summary of the care provided to Bhavani for your reference.    Please contact me with any questions you may have regarding the visit.    Sincerely,         Mary Putnam MD  [address]  [place] [address]    CC: No Recipients None

## 2025-07-02 ENCOUNTER — APPOINTMENT (OUTPATIENT)
Dept: PHYSICAL THERAPY | Facility: CLINIC | Age: 50
End: 2025-07-02
Payer: MEDICARE

## 2025-07-10 ENCOUNTER — TREATMENT (OUTPATIENT)
Dept: PHYSICAL THERAPY | Facility: CLINIC | Age: 50
End: 2025-07-10
Payer: MEDICARE

## 2025-07-10 DIAGNOSIS — M62.830 PARASPINAL MUSCLE SPASM: ICD-10-CM

## 2025-07-10 PROCEDURE — 97110 THERAPEUTIC EXERCISES: CPT | Mod: GP

## 2025-07-10 NOTE — PROGRESS NOTES
"   Physical Therapy Treatment/Discharge    Patient Name: Bhavani Carr  MRN: [id number]  Time Calculation  Start Time: 1300  Stop Time: 1345  Time Calculation (min): 45 min     PT Therapeutic Procedures Time Entry  Therapeutic Exercise Time Entry: 45        Current Problem  1. Paraspinal muscle spasm  Follow Up In Physical Therapy        Insurance    Visit #5/5  ANTHEM DUAL BOA COPAY 0 DED (MET)  COVERAGE 80 OOP [id number]) AUTH REQ       Subjective     General:   Pt notes shoulder is going \"great\" today. She states she has been able to perform all daily activities and return to some throwing at softball practice over the past few weeks. She states she has not been able to return to the gym but has abided by protocol from pain management. She notes she is confident in being able to build back into normal gym routine over next several weeks as well as progress HEP independently at home over next few weeks. She states she would like a full session today as a test of her shoulder function. She states she has had significant recovery since pain management and HEP re-introduction and is confident she can remain independent with progression back into full duties over next few weeks.    Pain:  Current:  0/10      Objective   Shoulder Musculoskeletal Exam    Inspection    Right      Right shoulder inspection is normal.    Left      Left shoulder inspection is normal.    Palpation    Right      Right shoulder palpation is normal.      Tenderness: none    Left      Left shoulder palpation is normal.      Tenderness: none    Range of Motion    Right      Right shoulder range of motion is normal.       Active ROM: normal and no pain.       Passive ROM: normal and no pain.     Left      Left shoulder range of motion is normal.       Active ROM: normal and no pain.       Passive ROM: normal and no pain.     Strength    Right      External rotation: 4+/5.       Internal rotation: 4+/5.       Abduction: 4+/5.       Biceps: 4+/5.       " Triceps: 4+/5.     Left      External rotation: 4+/5.       Internal rotation: 4+/5.       Abduction: 4+/5.       Biceps: 4+/5.       Triceps: 4+/5.     Neurovascular    Right      Right shoulder nerve sensation is normal.    Left      Left shoulder nerve sensation is normal.    Scapula    Right      Right shoulder scapula is normal.    Left       Left shoulder scapula is normal.    Special Tests    Right      Right shoulder special tests are normal.    Left      Left shoulder special tests are normal.    QuickDASH: 4.55% (13 raw score)   Treatment:   Reassessment of objective measures, discussion of plan of care moving forward, discussion/modification of up to date HEP, education regarding rationale of plan for continue progression toward desired level of function 15 minutes   HEP Review: 5'    Therapeutic Exercise 77257  UBE F/R 2'/2'  PB wall ball rolls 2x10 3 sec end range hold with R shoulder   Dowel chest press + overhead reach 2x15 5 sec end-range hold 3# bar  S/L circuit (flex/abd/ER) 1# added 2x10 ea  Wall slide flexion/scaption plus lift off 2x15  Rows/LAE vs GTB 2x15  Seated horizontal abduction 2x12 RTB  Standing flexion 1# bar 2x12      EDUCATION/HEP:  Pt was encouraged to continue with current frequency of HEP and to slowly return to full HEP and progressively build exercises and resistance as tolerated. She was encouraged with both HEP and gym routine to slowly build back in and always re-assess to regress or adjust if necessary. She was further encouraged to reach out with any questions or concerns.      Assessment:   Pt has shown significant progress since starting therapy as indicated with meeting all of therapy related goals. She demonstrates WNL and pain-free AROM of R shoulder and demonstrates the ability to perform all exercises in HEP with minimal to no discomfort and appropriate fatigue. She required minimal to no rest breaks in between and thus is ready for discharge and no longer requires  skilled therapy services.    Goals:    Patient will improve L/R shoulder flexion strength to >/=4+/5 for improved overhead lifting performance. (MET - pt demonstrates global 4+/5 or greater strength in all shoulder motions)    Patient will improve L/R shoulder abduction strength to >/=4+/5 for improved overhead lifting. (MET - pt demonstrates global 4+/5 or greater strength in all shoulder motions)    Patient will improve L/R shoulder ER strength to >/=4+/5 for improved shoulder stability with ADL performance. (MET - pt demonstrates global 4+/5 or greater strength in all shoulder motions)    Patient will improve L/R shoulder IR strength to >/=4+/5 for improved shoulder stability with ADL performance. (MET - pt demonstrates global 4+/5 or greater strength in all shoulder motions)    Patient will improve L/R shoulder flexion AROM to >/=0-160 deg for improved overhead shoulder mobility with reaching. (MET - pt demonstrates WNL for shoulder motion achieving 0-180 in both flexion and abduction)    Patient will improve L/R shoulder abduction AROM to >/=0-160 deg for improved lateral shoulder mobility. 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 (MET - pt has great recall and confidence in progressing HEP independently at home with no concerns)      Patient will report </=1/10 shoulder pain with ADL performance. (MET - pt reports at worst 1/10 shoulder pain with daily activities)       Plan  Discharged from skilled therapy effective 7/10/25 as pt has met all therapy goals and no longer requires skilled therapy services

## 2025-07-31 ENCOUNTER — APPOINTMENT (OUTPATIENT)
Dept: ORTHOPEDIC SURGERY | Facility: CLINIC | Age: 50
End: 2025-07-31
Payer: MEDICARE

## 2025-07-31 DIAGNOSIS — M75.21 BICEPS TENDINITIS OF RIGHT SHOULDER: Primary | ICD-10-CM

## 2025-07-31 PROCEDURE — 99213 OFFICE O/P EST LOW 20 MIN: CPT | Performed by: PHYSICAL MEDICINE & REHABILITATION

## 2025-07-31 NOTE — PROGRESS NOTES
PM&R  / Ortho clinic eval:    IMPRESSION:  Undifferentiated hypermobility disorder with POTS  Right shoulder pain Exacerbation - probably biceps tendinitis.   Left hip pain - probably glut tendinitis with mild weakness  Right patellofemoral knee replacement 2022  H/o Spinal cord stim 2015 C2-6 - still seeing Dr Rodriguez in [place]  H/o Bilateral Carpal Tunnel release    Right bicep long hand tendon fenestration by Dr James 6/10/25 - worked wonderfully! (But painful during)  (ropivocaine was tolerated)    RECOMMENDATIONS:  - discussed possible return to PT but her own program is very comprehensive including cuff  stability work so will just continue this.  We may note of her persistent scapular protraction which is probably a longstanding/learned pattern but I educated her with tactile biofeedback for scapular positioning during her home exercise program  - call for repeat tendon fenestration prn -   - try to stay off Aleve to 2 tablets twice per day - (4 tab total) - Tylenol 650 (arthritis strength)   -continue tizanidine and pain mgmt per Dr Rodriguez  - continue voltaren gel and lidocaine patches    f/u prn with us,    - should see Dr Rodriguez again in pain mgmt for spine or nerve pain but I'd certainly see again for joint/soft tissue pains.     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      6/10/25 - worked wonderfully! (But painful during)  - back to [employer] ~2-3 wk later and gradually doing more lately - has been working with  "PT x 3mo weeks.  But able to throw softball.      Recall  diffuse pains and hypermobility - lifelong issues with pain and joint subluxations, Knees since childhood - used to play lot of sports -  Right knee eventually needed patellar resurfacing.  Then  Pain in right shoulder after getting arrested in 2013 -  ?nerve damage- had workup and lead to disability then  eventually needed spinal cord stim at C2-6 helped greatly with shoulder \"nerve\" pains.  Lost lawsuit against [email or web address] but still awarded disability eventually.  Lately less active due to POTS in 2024  Left hip has been popping out laterally , no full dislocations, more pain laterally.      Lost 50lb with Ozempic    Right knee still hurts with cold weather 10/10 for winter months, 2/10 otherwise.      Used Aleve, Tizanidine , Trileptal for tremors, Luvox for OCD            Patient reports no fevers, chills, sweats, night pain, weight loss or cancer history no b/bl changes.    Pertinent Physical Exam:  R Shoulder Exam: ROM full - neg impingment. speed test -   Scapular kinetics mildly protractedr > Left.  nonTender bicep groove ,  Empty/full can test neg       SUPPORTING DOCUMENTATION (remaining history, exam, other findings):  Work-up reviewed - this has included xray knee, CT abd 2020 -hips OK there -personally interpreted xray knees and CT Abd for hip     Treatment has included prior PT with knee surgery and prior with shoulder and per above.     See above for Assessment and Plan    "

## 2025-08-03 PROBLEM — M75.21 BICEPS TENDINITIS OF RIGHT SHOULDER: Status: ACTIVE | Noted: 2025-08-03

## 2025-08-06 ENCOUNTER — APPOINTMENT (OUTPATIENT)
Dept: CARDIOLOGY | Facility: CLINIC | Age: 50
End: 2025-08-06
Payer: MEDICARE

## 2025-08-06 VITALS
DIASTOLIC BLOOD PRESSURE: 70 MMHG | SYSTOLIC BLOOD PRESSURE: 128 MMHG | BODY MASS INDEX: 27.97 KG/M2 | HEIGHT: 62 IN | WEIGHT: 152 LBS | HEART RATE: 77 BPM

## 2025-08-06 DIAGNOSIS — I95.1 AUTONOMIC ORTHOSTATIC HYPOTENSION: Primary | ICD-10-CM

## 2025-08-06 DIAGNOSIS — G90.A POTS (POSTURAL ORTHOSTATIC TACHYCARDIA SYNDROME): ICD-10-CM

## 2025-08-06 DIAGNOSIS — R55 SYNCOPE, UNSPECIFIED SYNCOPE TYPE: ICD-10-CM

## 2025-08-06 PROCEDURE — 3008F BODY MASS INDEX DOCD: CPT | Performed by: PHYSICIAN ASSISTANT

## 2025-08-06 PROCEDURE — 3078F DIAST BP <80 MM HG: CPT | Performed by: PHYSICIAN ASSISTANT

## 2025-08-06 PROCEDURE — 93000 ELECTROCARDIOGRAM COMPLETE: CPT | Performed by: INTERNAL MEDICINE

## 2025-08-06 PROCEDURE — 99214 OFFICE O/P EST MOD 30 MIN: CPT | Performed by: PHYSICIAN ASSISTANT

## 2025-08-06 PROCEDURE — 3074F SYST BP LT 130 MM HG: CPT | Performed by: PHYSICIAN ASSISTANT

## 2025-08-06 RX ORDER — FLUDROCORTISONE ACETATE 0.1 MG/1
0.1 TABLET ORAL 2 TIMES DAILY
Qty: 90 TABLET | Refills: 3 | Status: SHIPPED | OUTPATIENT
Start: 2025-08-06 | End: 2026-02-02

## 2025-08-06 NOTE — PROGRESS NOTES
Electrophysiology Office Visit     CHIEF COMPLAINT  Chief Complaint   Patient presents with    Follow-up     6 month follow up for POTS  Pt had 14 days of POTS episodes, mostly in the early a.m.      Primary EP doctor: Dr Simmons  Primary Cardiologist:    EP History: Orthostatic hypotension. Spinal cord stimulator.   2/14/2024 NEW EP OV Dr Simmons, referred for POTS. Doing well until 6-9 month prior. More episodes of presycope related with postural changes. Using midodrine 2.5 mg twice a day. Get tilt table test, increase midodrine dose.   3/25/2024 Tilt table test = positive; vasodepressor response with nitroglycerin provocation on midodrine. At 29 minutes into the test her BP dropped to 48/36 with a heart rate of 78.  6/27/2024 Admit due to syncope. Treated w/ fluids and midodrine  2/5/2025 Add Tasia 0.1 mg daily to better control presyncope  Maintained on midodrine 5 mg TID, and Florinef 0.1 mg daily    HPI: 8/6/2025  50 year old female pmhx orthostatic hypotension. Spinal cord stimulator.     Patient is here for follow-up after Florinef 0.1 mg daily was added to her regimen back in February.  Patient states during the day she feels great.  She has no symptoms of orthostatic hypotension.  Even at times, she will forget to take her third dose of midodrine.  However, between 2-4 am she will get up use bathroom or get water.  She has had 14 episodes since February where her when she gets up, she has ear ringing for then next 7-8 mins.  This seems to be a prodrome to her hypotensive episodes.  The ringing in her ears can get very loud and she will then go back and sit down.  It then dissipates.  One time she had a syncopal episode when while voiding on the toilet during the early hours of the morning.    She is looking for something to help her during these morning hours that she may have to get up to use the bathroom or get a drink of water.    Today's EKG Interpretation: Normal sinus rhythm, rate 77 bpm, DE interval  124 ms, QRS duration 92 ms, QTc 443 ms, incomplete right bundle branch block    VITALS  Vitals:    08/06/25 1258   BP: 128/70   Pulse: 77     Wt Readings from Last 4 Encounters:   08/06/25 68.9 kg (152 lb)   06/12/25 67.4 kg (148 lb 9.4 oz)   06/03/25 68.8 kg (151 lb 9.6 oz)   04/28/25 67.1 kg (148 lb)     PHYSICAL EXAM:  GENERAL:  Well developed, well nourished, in no acute distress.  NEURO/PSYCH:  No focal deficits. A&O x 3   LUNGS:  Clear to auscultation bilaterally. No wheezing, rhonci, or crackles  HEART:  RRR, no M/R/G.   EXTREMITIES:  Warm with good color, no clubbing or cyanosis.  No pitting edema.     Medical History[1]  Surgical History[2]  Social History[3]  Family History[4]  RX Allergies[5]   Current Outpatient Medications   Medication Instructions    ascorbic acid, vitamin C, 100 mg chewable tablet Chew.    atorvastatin (LIPITOR) 40 mg, oral, Nightly    biotin 10,000 mcg capsule Take by mouth.    cholecalciferol (Vitamin D-3) 50 mcg (2,000 unit) capsule Take by mouth.    cyanocobalamin (Vitamin B-12) 50 mcg tablet Take by mouth.    EPINEPHrine 0.3 mg/0.3 mL injection syringe as directed for acute allergic reaction    fludrocortisone (FLORINEF) 0.1 mg, oral, 2 times daily    fLuvoxaMINE (LUVOX) 12.5 mg, oral, Nightly    medical cannabis 1 each    midodrine (Proamatine) 5 mg tablet TAKE 1 TABLET BY MOUTH 3 TIMES DAILY (MORNING, MIDDAY, AND LATE AFTERNOON).    multivitamin with folic acid (Daily-Mala, with folic acid,) 400 mcg tablet 1 tablet, Daily    omeprazole (PRILOSEC) 10 mg, oral, Daily    OneTouch Delica Plus Lancet 1 Units, Not Applicable, 3 times daily, Please fill with lancets compatible with One Touch Verio Glucometer Kit.    OneTouch Verio Reflect Meter misc USE AS DIRECTED TO MONITOR BLOOD GLUCOSE    OneTouch Verio test strips strip USE TO TEST ONCE DAILY    OXcarbazepine (TRILEPTAL) 300 mg, Nightly    Ozempic 1 mg, subcutaneous, Once Weekly    prasterone, dhea, 10 mg tablet Take by mouth.     tiZANidine (ZANAFLEX) 4 mg, 4 times daily PRN      Relevant reports:   3/12/2024 ECHO  1. Left ventricular systolic function is normal with a 55-60% estimated ejection fraction.  2. Normal right ventricular chamber size and function.  3. Trivial to 1+ mitral regurgitation.  4. Trivial to 1+ tricuspid regurgitation with estimated RVSP 20 mmHg.     3/25/2024 Tilt table test  Abnormal Tilt table testing  Vasodepressor response to head up tilt testing.     3/9/2018 Tilt table test (seen in media)  This study is essentially normal with no evidence of orthostatic hypotension or dysautonomia.     Problem List[6]     ASSESSMENT AND PLAN  Problem List Items Addressed This Visit       POTS (postural orthostatic tachycardia syndrome)  It appears that she came with a diagnosis of POTS however with her most recent tilt table test I do not see any tachycardia related to her hypotension.    Relevant Medications    fludrocortisone (Florinef) 0.1 mg tablet    Syncope, unspecified syncope type    Relevant Medications    fludrocortisone (Florinef) 0.1 mg tablet    Other Relevant Orders    ECG 12 lead (Clinic Performed)    Follow Up In Cardiology    Autonomic orthostatic hypotension - Primary  I will add another dose of Florinef 0.1 mg in the evening.  I have given her a new prescription for Florinef 0.1 mg twice daily.  This will hopefully help her during those hours between 2 and 4:00 in the morning when she has to get up to use the bathroom or to get some water that she will not have episodes of hypotension.  We did go over behavioral modifications such as taking a couple minutes from lying, to sitting, to standing before she abruptly gets up from bed.  However she is doing this.      She will follow-up with me in 3 months.  She states that she will likely move to [place] by the end of the year so she would like to have 1 more appointment prior to her move to [place].    Relevant Medications    fludrocortisone (Florinef)  0.1 mg tablet    Other Relevant Orders    Follow Up In Cardiology        MIGUELITO Allred PA-C             [1]   Past Medical History:  Diagnosis Date    Acne     Anxiety     Arthritis     Chronic headaches     Depression     Hypermetropia, right eye 03/16/2017    Hyperopia of right eye with astigmatism and presbyopia    Hypermetropia, right eye 03/16/2017    Hyperopia of right eye with astigmatism and presbyopia    Hypermetropia, right eye 03/16/2017    Hyperopia of right eye with astigmatism and presbyopia    Hypermetropia, right eye 03/16/2017    Hyperopia of right eye with astigmatism and presbyopia    OCD (obsessive compulsive disorder)     Personal history of diseases of the blood and blood-forming organs and certain disorders involving the immune mechanism 06/06/2015    History of leukocytosis    Personal history of diseases of the blood and blood-forming organs and certain disorders involving the immune mechanism 07/16/2020    History of leukocytosis    Personal history of other endocrine, nutritional and metabolic disease 07/18/2019    History of diabetes mellitus    Personal history of urinary (tract) infections 06/30/2015    History of urinary tract infection    POTS (postural orthostatic tachycardia syndrome)     S/P insertion of spinal cord stimulator     Shingles     Type 2 diabetes mellitus     Unilateral primary osteoarthritis, right knee 12/20/2022    Right knee DJD    Varicella    [2]   Past Surgical History:  Procedure Laterality Date    ANKLE ARTHROPLASTY      ANKLE SURGERY  12/04/2017    Ankle Surgery    CARPAL TUNNEL RELEASE Bilateral     COLONOSCOPY      CT ANGIO NECK  09/28/2022    CT NECK ANGIO W AND WO IV CONTRAST 9/28/2022    CT HEAD ANGIO W AND WO IV CONTRAST  09/28/2022    CT HEAD ANGIO W AND WO IV CONTRAST 9/28/2022    EYE SURGERY  04/17/2014    Eye Surgery    HYSTERECTOMY      KNEE SURGERY  04/17/2014    Knee Surgery    OTHER SURGICAL HISTORY  04/17/2014    Ovarian Cystectomy     OTHER SURGICAL HISTORY  10/11/2019    Knee surgery    OTHER SURGICAL HISTORY  07/21/2020    Colposcopy    OTHER SURGICAL HISTORY  01/01/2023    Knee replacement partial    OTHER SURGICAL HISTORY  08/17/2020    Hysterectomy total    SPINAL CORD STIMULATOR IMPLANT      TUBAL LIGATION  02/27/2015    Tubal Ligation   [3]   Social History  Tobacco Use    Smoking status: Former     Types: Cigarettes    Smokeless tobacco: Current    Tobacco comments:     Vapes daily   Vaping Use    Vaping status: Every Day    Substances: Nicotine   Substance Use Topics    Alcohol use: Yes     Comment: socially    Drug use: Not Currently     Types: Marijuana   [4]   Family History  Problem Relation Name Age of Onset    Diabetes Mother Rajani Carr     Hypertension Mother Rajani Carr     Stroke Mother Rajani Carr     Arthritis Mother Rajani Carr     Obesity Mother Rajani Carr     Coronary artery disease Father Dad     Other (cardiac disorder) Father Dad     Alcohol abuse Father Dad     Hypertension Sister Geetha Abreu     Other (pituitary tumor) Sister Geetha Abreu     Stroke Sister Geetha Abreu     Obesity Sister Geetha Abreu     Immunodeficiency Brother Lb Landin     Drug abuse Brother Lb Landin     Diabetes Mother's Sister Dominique Cain     Obesity Mother's Sister Dominique Cain     Breast cancer Father's Sister Aunt Jason     Stroke Father's Sister Aunt Jason     Diabetes Maternal Grandmother Kelli Byrnes    [5]   Allergies  Allergen Reactions    Yellow Jacket Venom Anaphylaxis    Bee Venom Protein (Honey Bee) Unknown    Fluzone Quad 7542-3812 (Pf) [Flu Vacc [id number] 6mos Up(Pf)] Other     Deathly ill    Guaifenesin Unknown    Guaifenex Unknown    Influenza Virus Vac. Tri-Split Other    Influenza Virus Vaccine Whole Unknown    Lidocaine Other     Lidocaine injections - become partially paralyzed    Metoprolol Unknown    Morphine Unknown and Hives    Iodinated Contrast Media  Nausea And Vomiting   [6]   Patient Active Problem List  Diagnosis    ASCUS with positive high risk HPV cervical    Instability of ankle    Insomnia    Increased frequency of urination    Impetigo    Hyperopia of both eyes with astigmatism and presbyopia    History of strabismus surgery    H/O rectal sphincterotomy    Knee pain    Laryngitis    Left flank pain    LGSIL of cervix of undetermined significance    Nausea    Obsessive-compulsive behavior    Panic anxiety syndrome    Plantar fasciitis    Pre-syncope    Pulmonary nodule    Rash    Shingles    Sleep paralysis    Thrush    Tinea corporis    Tremor    UTI symptoms    Viral URI with cough    Yeast infection of the skin    GERD (gastroesophageal reflux disease)    Gastrocnemius equinus    Exotropia of left eye    Elevated WBC count    Elevated platelet count    Eczema of both external ears    Dry eye syndrome    Diarrhea    Diabetes mellitus type 2 without retinopathy (Multi)    CTS (carpal tunnel syndrome)    Chronic vulvitis    Fibromyalgia    Back pain, chronic    POTS (postural orthostatic tachycardia syndrome)    Arthritis of knee    Anemia    Acute bronchitis    Right knee DJD    Type 2 diabetes mellitus with diabetic neuropathy, without long-term current use of insulin    BMI 30.0-30.9,adult    Former smoker    Depression, recurrent    Vasodepressor syncope    Syncope, unspecified syncope type    Paraspinal muscle spasm    Gluteal tendinitis, left hip    Tendinopathy of right rotator cuff    Hypermobility syndrome    Biceps tendinitis of right shoulder    Autonomic orthostatic hypotension

## 2025-08-06 NOTE — PATIENT INSTRUCTIONS
Great to see you today.   Please take your medications and or do life style behavior modifications as discussed.   Please make appointment in 3 months with Syl Duran PA-C  Start taking florinef 0.1 mg twice daily.   Please call if you have any questions or concerns.  Please go to emergency department if you have abrupt onset of chest, shortness of breath, light headedness or dizziness.

## 2025-08-07 ENCOUNTER — APPOINTMENT (OUTPATIENT)
Dept: OPHTHALMOLOGY | Facility: CLINIC | Age: 50
End: 2025-08-07
Payer: MEDICARE

## 2025-08-07 DIAGNOSIS — H52.4 HYPEROPIA OF BOTH EYES WITH ASTIGMATISM AND PRESBYOPIA: Primary | ICD-10-CM

## 2025-08-07 DIAGNOSIS — H52.203 HYPEROPIA OF BOTH EYES WITH ASTIGMATISM AND PRESBYOPIA: Primary | ICD-10-CM

## 2025-08-07 DIAGNOSIS — H52.03 HYPEROPIA OF BOTH EYES WITH ASTIGMATISM AND PRESBYOPIA: Primary | ICD-10-CM

## 2025-08-07 DIAGNOSIS — Z98.890 HISTORY OF STRABISMUS SURGERY: ICD-10-CM

## 2025-08-07 DIAGNOSIS — E11.9 DIABETES MELLITUS TYPE 2 WITHOUT RETINOPATHY (MULTI): ICD-10-CM

## 2025-08-07 DIAGNOSIS — H50.112 EXOTROPIA OF LEFT EYE: ICD-10-CM

## 2025-08-07 PROCEDURE — 92015 DETERMINE REFRACTIVE STATE: CPT | Performed by: OPTOMETRIST

## 2025-08-07 PROCEDURE — 92310 CONTACT LENS FITTING OU: CPT | Performed by: OPTOMETRIST

## 2025-08-07 PROCEDURE — 92014 COMPRE OPH EXAM EST PT 1/>: CPT | Performed by: OPTOMETRIST

## 2025-08-07 ASSESSMENT — REFRACTION_WEARINGRX
OS_ADD: +2.00
SPECS_TYPE: BIFOCAL
OD_CYLINDER: -1.75
OS_AXIS: 083
OD_SPHERE: +0.75
OD_ADD: +2.00
OD_AXIS: 094
OS_CYLINDER: -1.50
OS_SPHERE: +1.00

## 2025-08-07 ASSESSMENT — ENCOUNTER SYMPTOMS
CARDIOVASCULAR NEGATIVE: 0
EYES NEGATIVE: 1
ALLERGIC/IMMUNOLOGIC NEGATIVE: 0
HEMATOLOGIC/LYMPHATIC NEGATIVE: 0
PSYCHIATRIC NEGATIVE: 0
ENDOCRINE NEGATIVE: 1
GASTROINTESTINAL NEGATIVE: 0
RESPIRATORY NEGATIVE: 0
MUSCULOSKELETAL NEGATIVE: 0
NEUROLOGICAL NEGATIVE: 0
CONSTITUTIONAL NEGATIVE: 0

## 2025-08-07 ASSESSMENT — REFRACTION_MANIFEST
OS_AXIS: 080
OD_CYLINDER: -2.00
OS_SPHERE: +2.00
OD_SPHERE: +1.50
OD_CYLINDER: -2.00
OS_SPHERE: +1.75
OS_CYLINDER: -2.00
OD_AXIS: 098
METHOD_AUTOREFRACTION: 1
OS_ADD: +3.00
OD_ADD: +3.00
OD_AXIS: 100
OD_SPHERE: +1.75
OS_AXIS: 087
OS_CYLINDER: -1.75

## 2025-08-07 ASSESSMENT — VISUAL ACUITY
OD_CC: 20/20
METHOD: SNELLEN - LINEAR
CORRECTION_TYPE: GLASSES
OD_CC+: -2
OS_CC: 20/20
OS_CC+: -2

## 2025-08-07 ASSESSMENT — REFRACTION_CURRENTRX
OD_SPHERE: +1.50
OS_SPHERE: +2.00
OS_CYLINDER: -1.75
OD_BASECURVE: 8.6
OD_BRAND: ULTRA FOR ASTIGMATISM
OD_CYLINDER: -1.75
OS_AXIS: 090
OS_BRAND: ULTRA FOR ASTIGMATISM
OD_AXIS: 100
OD_DIAMETER: 14.5
OS_DIAMETER: 14.5
OS_BASECURVE: 8.6

## 2025-08-07 ASSESSMENT — TONOMETRY
IOP_METHOD: TONOPEN
OS_IOP_MMHG: 19

## 2025-08-07 ASSESSMENT — EXTERNAL EXAM - RIGHT EYE: OD_EXAM: NORMAL

## 2025-08-07 ASSESSMENT — EXTERNAL EXAM - LEFT EYE: OS_EXAM: NORMAL

## 2025-08-07 ASSESSMENT — SLIT LAMP EXAM - LIDS
COMMENTS: GOOD POSITION
COMMENTS: GOOD POSITION

## 2025-08-07 ASSESSMENT — CONF VISUAL FIELD
OD_NORMAL: 1
OS_INFERIOR_NASAL_RESTRICTION: 0
OS_SUPERIOR_NASAL_RESTRICTION: 0
OD_INFERIOR_NASAL_RESTRICTION: 0
METHOD: COUNTING FINGERS
OS_INFERIOR_TEMPORAL_RESTRICTION: 0
OD_SUPERIOR_NASAL_RESTRICTION: 0
OD_INFERIOR_TEMPORAL_RESTRICTION: 0
OS_NORMAL: 1
OD_SUPERIOR_TEMPORAL_RESTRICTION: 0
OS_SUPERIOR_TEMPORAL_RESTRICTION: 0

## 2025-08-07 ASSESSMENT — CUP TO DISC RATIO
OD_RATIO: .3
OS_RATIO: .3

## 2025-08-07 NOTE — PROGRESS NOTES
A spectacle prescription was given at the patient`s request. Minor change. Patient reads a lot and gets eyestrain. Recommend seperate reading glasses or an officelens to alleviate symptoms.     A contact lens prescription was dispensed at the patient's request.     Mild NS and will monitor.     DMII No retinopathy. I discussed the value of good blood sugar control under your management and annual eye exams. The patient was asked to return to our clinic in one year or sooner if ocular or vision changes occur.    The patient was asked to return to our clinic in one year or sooner if ocular or vision changes occur.

## 2025-08-18 ENCOUNTER — APPOINTMENT (OUTPATIENT)
Dept: CT IMAGING | Age: 50
End: 2025-08-18
Payer: MEDICARE

## 2025-08-18 ENCOUNTER — APPOINTMENT (OUTPATIENT)
Dept: GENERAL RADIOLOGY | Age: 50
End: 2025-08-18
Payer: MEDICARE

## 2025-08-18 ENCOUNTER — HOSPITAL ENCOUNTER (EMERGENCY)
Age: 50
Discharge: HOME | End: 2025-08-19
Payer: MEDICARE

## 2025-08-18 DIAGNOSIS — S93.401A SPRAIN OF RIGHT ANKLE, UNSPECIFIED LIGAMENT, INITIAL ENCOUNTER: Primary | ICD-10-CM

## 2025-08-18 PROCEDURE — 6370000000 HC RX 637 (ALT 250 FOR IP): Performed by: EMERGENCY MEDICINE

## 2025-08-18 PROCEDURE — 6360000002 HC RX W HCPCS: Performed by: EMERGENCY MEDICINE

## 2025-08-18 PROCEDURE — 73610 X-RAY EXAM OF ANKLE: CPT

## 2025-08-18 PROCEDURE — 96372 THER/PROPH/DIAG INJ SC/IM: CPT

## 2025-08-18 PROCEDURE — 99284 EMERGENCY DEPT VISIT MOD MDM: CPT

## 2025-08-18 PROCEDURE — 73700 CT LOWER EXTREMITY W/O DYE: CPT

## 2025-08-18 RX ORDER — OXYCODONE AND ACETAMINOPHEN 5; 325 MG/1; MG/1
1 TABLET ORAL ONCE
Refills: 0 | Status: COMPLETED | OUTPATIENT
Start: 2025-08-18 | End: 2025-08-18

## 2025-08-18 RX ORDER — KETOROLAC TROMETHAMINE 30 MG/ML
30 INJECTION, SOLUTION INTRAMUSCULAR; INTRAVENOUS ONCE
Status: COMPLETED | OUTPATIENT
Start: 2025-08-18 | End: 2025-08-18

## 2025-08-18 RX ADMIN — KETOROLAC TROMETHAMINE 30 MG: 30 INJECTION, SOLUTION INTRAMUSCULAR at 21:19

## 2025-08-18 RX ADMIN — OXYCODONE AND ACETAMINOPHEN 1 TABLET: 5; 325 TABLET ORAL at 21:20

## 2025-08-18 ASSESSMENT — PAIN SCALES - GENERAL
PAINLEVEL_OUTOF10: 3
PAINLEVEL_OUTOF10: 9

## 2025-08-18 ASSESSMENT — PAIN - FUNCTIONAL ASSESSMENT: PAIN_FUNCTIONAL_ASSESSMENT: 0-10

## 2025-08-18 ASSESSMENT — LIFESTYLE VARIABLES
HOW MANY STANDARD DRINKS CONTAINING ALCOHOL DO YOU HAVE ON A TYPICAL DAY: 1 OR 2
HOW OFTEN DO YOU HAVE A DRINK CONTAINING ALCOHOL: MONTHLY OR LESS

## 2025-08-19 ENCOUNTER — OFFICE VISIT (OUTPATIENT)
Dept: ORTHOPEDIC SURGERY | Facility: CLINIC | Age: 50
End: 2025-08-19
Payer: MEDICARE

## 2025-08-19 VITALS
RESPIRATION RATE: 20 BRPM | BODY MASS INDEX: 27.97 KG/M2 | OXYGEN SATURATION: 98 % | DIASTOLIC BLOOD PRESSURE: 76 MMHG | HEART RATE: 84 BPM | HEIGHT: 62 IN | SYSTOLIC BLOOD PRESSURE: 169 MMHG | TEMPERATURE: 98.1 F | WEIGHT: 152 LBS

## 2025-08-19 DIAGNOSIS — S93.401A SEVERE ANKLE SPRAIN, RIGHT, INITIAL ENCOUNTER: Primary | ICD-10-CM

## 2025-08-19 DIAGNOSIS — Z12.31 ENCOUNTER FOR SCREENING MAMMOGRAM FOR BREAST CANCER: ICD-10-CM

## 2025-08-19 PROCEDURE — 99212 OFFICE O/P EST SF 10 MIN: CPT | Performed by: FAMILY MEDICINE

## 2025-08-19 PROCEDURE — 99213 OFFICE O/P EST LOW 20 MIN: CPT | Performed by: FAMILY MEDICINE

## 2025-08-19 RX ORDER — LIDOCAINE 50 MG/G
1 PATCH TOPICAL DAILY
Qty: 10 PATCH | Refills: 0 | Status: SHIPPED | OUTPATIENT
Start: 2025-08-19 | End: 2025-08-29

## 2025-08-19 RX ORDER — KETOROLAC TROMETHAMINE 10 MG/1
10 TABLET, FILM COATED ORAL EVERY 6 HOURS PRN
Qty: 20 TABLET | Refills: 0 | Status: SHIPPED | OUTPATIENT
Start: 2025-08-19

## 2025-08-27 ENCOUNTER — TELEPHONE (OUTPATIENT)
Dept: ORTHOPEDIC SURGERY | Facility: CLINIC | Age: 50
End: 2025-08-27
Payer: MEDICARE

## 2025-08-28 ENCOUNTER — TELEPHONE (OUTPATIENT)
Dept: ORTHOPEDIC SURGERY | Facility: CLINIC | Age: 50
End: 2025-08-28
Payer: MEDICARE

## 2025-08-28 DIAGNOSIS — S93.401A SEVERE ANKLE SPRAIN, RIGHT, INITIAL ENCOUNTER: ICD-10-CM

## 2025-09-02 ENCOUNTER — OFFICE VISIT (OUTPATIENT)
Dept: ORTHOPEDIC SURGERY | Facility: CLINIC | Age: 50
End: 2025-09-02
Payer: MEDICARE

## 2025-09-02 ENCOUNTER — APPOINTMENT (OUTPATIENT)
Dept: ORTHOPEDIC SURGERY | Facility: CLINIC | Age: 50
End: 2025-09-02
Payer: MEDICARE

## 2025-09-02 ENCOUNTER — HOSPITAL ENCOUNTER (OUTPATIENT)
Dept: RADIOLOGY | Facility: CLINIC | Age: 50
Discharge: HOME | End: 2025-09-02
Payer: MEDICARE

## 2025-09-02 DIAGNOSIS — G57.91 NEURITIS OF ANKLE, RIGHT: ICD-10-CM

## 2025-09-02 DIAGNOSIS — M25.571 RIGHT ANKLE PAIN, UNSPECIFIED CHRONICITY: ICD-10-CM

## 2025-09-02 DIAGNOSIS — S93.401A SEVERE ANKLE SPRAIN, RIGHT, INITIAL ENCOUNTER: Primary | ICD-10-CM

## 2025-09-02 DIAGNOSIS — E11.40 TYPE 2 DIABETES MELLITUS WITH DIABETIC NEUROPATHY, WITHOUT LONG-TERM CURRENT USE OF INSULIN: ICD-10-CM

## 2025-09-02 PROCEDURE — 99204 OFFICE O/P NEW MOD 45 MIN: CPT | Performed by: ORTHOPAEDIC SURGERY

## 2025-09-02 PROCEDURE — 73610 X-RAY EXAM OF ANKLE: CPT | Mod: RIGHT SIDE | Performed by: RADIOLOGY

## 2025-09-02 PROCEDURE — 99212 OFFICE O/P EST SF 10 MIN: CPT

## 2025-09-02 PROCEDURE — 73610 X-RAY EXAM OF ANKLE: CPT | Mod: RT

## 2025-09-02 ASSESSMENT — PAIN - FUNCTIONAL ASSESSMENT: PAIN_FUNCTIONAL_ASSESSMENT: 0-10

## 2025-09-02 ASSESSMENT — PAIN SCALES - GENERAL: PAINLEVEL_OUTOF10: 6

## 2025-09-03 ENCOUNTER — HOSPITAL ENCOUNTER (OUTPATIENT)
Dept: RADIOLOGY | Facility: EXTERNAL LOCATION | Age: 50
Discharge: HOME | End: 2025-09-03

## 2025-09-04 ENCOUNTER — APPOINTMENT (OUTPATIENT)
Dept: PRIMARY CARE | Facility: CLINIC | Age: 50
End: 2025-09-04
Payer: MEDICARE

## 2025-09-09 ENCOUNTER — APPOINTMENT (OUTPATIENT)
Dept: ORTHOPEDIC SURGERY | Facility: CLINIC | Age: 50
End: 2025-09-09
Payer: MEDICARE

## 2025-11-06 ENCOUNTER — APPOINTMENT (OUTPATIENT)
Dept: CARDIOLOGY | Facility: CLINIC | Age: 50
End: 2025-11-06
Payer: MEDICARE

## 2025-12-04 ENCOUNTER — APPOINTMENT (OUTPATIENT)
Dept: PRIMARY CARE | Facility: CLINIC | Age: 50
End: 2025-12-04
Payer: MEDICARE

## 2026-08-11 ENCOUNTER — APPOINTMENT (OUTPATIENT)
Dept: OPHTHALMOLOGY | Facility: CLINIC | Age: 51
End: 2026-08-11
Payer: MEDICARE

## (undated) DEVICE — KIT ARMOR C DRP COLLAPSIBLE AND SELF EXP TOP CVR FOR FLUOROSCOPIC

## (undated) DEVICE — GLOVE ORANGE PI 8   MSG9080

## (undated) DEVICE — COUNTER NDL 40 COUNT HLD 70 FOAM BLK ADH W/ MAG

## (undated) DEVICE — INTENDED FOR TISSUE SEPARATION, AND OTHER PROCEDURES THAT REQUIRE A SHARP SURGICAL BLADE TO PUNCTURE OR CUT.: Brand: BARD-PARKER ® CARBON RIB-BACK BLADES

## (undated) DEVICE — GOWN,AURORA,NONREINFORCED,LARGE: Brand: MEDLINE

## (undated) DEVICE — NEEDLE HYPO 25GA L1.5IN BLU POLYPR HUB S STL REG BVL STR

## (undated) DEVICE — MARKER SURG SKIN GENTIAN VLT REG TIP W/ 6IN RUL

## (undated) DEVICE — PACK,LAPAROTOMY,NO GOWNS: Brand: MEDLINE

## (undated) DEVICE — E-Z CLEAN, NON-STICK, PTFE COATED, ELECTROSURGICAL BLADE ELECTRODE, MODIFIED EXTENDED INSULATION, 2.5 INCH (6.35 CM): Brand: MEGADYNE

## (undated) DEVICE — 1010 S-DRAPE TOWEL DRAPE 10/BX: Brand: STERI-DRAPE™

## (undated) DEVICE — NEPTUNE E-SEP SMOKE EVACUATION PENCIL, COATED, 70MM BLADE, PUSH BUTTON SWITCH: Brand: NEPTUNE E-SEP

## (undated) DEVICE — GAUZE,SPONGE,4"X4",16PLY,XRAY,STRL,LF: Brand: MEDLINE

## (undated) DEVICE — SUTURE VCRL SZ 3-0 L27IN ABSRB UD L26MM SH 1/2 CIR J416H

## (undated) DEVICE — SYRINGE MED 10ML LUERLOCK TIP W/O SFTY DISP

## (undated) DEVICE — Device

## (undated) DEVICE — 3M™ IOBAN™ 2 ANTIMICROBIAL INCISE DRAPE 6650EZ: Brand: IOBAN™ 2

## (undated) DEVICE — 4-PORT MANIFOLD: Brand: NEPTUNE 2

## (undated) DEVICE — SPONGE GZ W4XL4IN RAYON POLY FILL CVR W/ NONWOVEN FAB

## (undated) DEVICE — 3M™ TEGADERM™ TRANSPARENT FILM DRESSING FRAME STYLE, 1624W, 2-3/8 IN X 2-3/4 IN (6 CM X 7 CM), 100/CT 4CT/CASE: Brand: 3M™ TEGADERM™

## (undated) DEVICE — ELECTRODE PT RET AD L9FT HI MOIST COND ADH HYDRGEL CORDED

## (undated) DEVICE — LABEL MED MINI W/ MARKER

## (undated) DEVICE — TUBING, SUCTION, 1/4" X 10', STRAIGHT: Brand: MEDLINE

## (undated) DEVICE — DECANTER BAG 9": Brand: MEDLINE INDUSTRIES, INC.

## (undated) DEVICE — HYPODERMIC SAFETY NEEDLE: Brand: MAGELLAN

## (undated) DEVICE — APPLICATOR MEDICATED 26 CC SOLUTION HI LT ORNG CHLORAPREP

## (undated) DEVICE — GLOVE ORANGE PI 7 1/2   MSG9075

## (undated) DEVICE — SHEET,DRAPE,53X77,STERILE: Brand: MEDLINE

## (undated) DEVICE — 3M™ STERI-STRIP™ REINFORCED ADHESIVE SKIN CLOSURES, R1547, 1/2 IN X 4 IN (12 MM X 100 MM), 6 STRIPS/ENVELOPE: Brand: 3M™ STERI-STRIP™

## (undated) DEVICE — SUTURE VCRL SZ 4-0 L18IN ABSRB UD L19MM PS-2 3/8 CIR PRIM J496H

## (undated) DEVICE — SUTURE PERMA-HAND SZ 2-0 L30IN NONABSORBABLE BLK L26MM SH K833H

## (undated) DEVICE — SYRINGE IRRIG 60ML SFT PLIABLE BLB EZ TO GRP 1 HND USE W/